# Patient Record
Sex: FEMALE | Race: WHITE | NOT HISPANIC OR LATINO
[De-identification: names, ages, dates, MRNs, and addresses within clinical notes are randomized per-mention and may not be internally consistent; named-entity substitution may affect disease eponyms.]

---

## 2017-07-31 ENCOUNTER — RECORD ABSTRACTING (OUTPATIENT)
Age: 82
End: 2017-07-31

## 2017-07-31 DIAGNOSIS — C34.90 MALIGNANT NEOPLASM OF UNSPECIFIED PART OF UNSPECIFIED BRONCHUS OR LUNG: ICD-10-CM

## 2017-08-14 ENCOUNTER — INPATIENT (INPATIENT)
Facility: HOSPITAL | Age: 82
LOS: 1 days | Discharge: HOME | End: 2017-08-16
Attending: HOSPITALIST | Admitting: FAMILY MEDICINE
Payer: MEDICARE

## 2017-08-14 VITALS — WEIGHT: 160.06 LBS | HEIGHT: 66 IN

## 2017-08-14 LAB
ADD ON TEST-SPECIMEN IN LAB: SIGNIFICANT CHANGE UP
ALBUMIN SERPL ELPH-MCNC: 3.5 G/DL — SIGNIFICANT CHANGE UP (ref 3.3–5)
ALP SERPL-CCNC: 75 U/L — SIGNIFICANT CHANGE UP (ref 40–120)
ALT FLD-CCNC: 16 U/L — SIGNIFICANT CHANGE UP (ref 12–78)
ANION GAP SERPL CALC-SCNC: 8 MMOL/L — SIGNIFICANT CHANGE UP (ref 5–17)
APTT BLD: 31 SEC — SIGNIFICANT CHANGE UP (ref 27.5–37.4)
AST SERPL-CCNC: 16 U/L — SIGNIFICANT CHANGE UP (ref 15–37)
BASOPHILS # BLD AUTO: 0 K/UL — SIGNIFICANT CHANGE UP (ref 0–0.2)
BASOPHILS NFR BLD AUTO: 0.9 % — SIGNIFICANT CHANGE UP (ref 0–2)
BILIRUB SERPL-MCNC: 0.6 MG/DL — SIGNIFICANT CHANGE UP (ref 0.2–1.2)
BUN SERPL-MCNC: 13 MG/DL — SIGNIFICANT CHANGE UP (ref 7–23)
CALCIUM SERPL-MCNC: 9 MG/DL — SIGNIFICANT CHANGE UP (ref 8.5–10.1)
CHLORIDE SERPL-SCNC: 97 MMOL/L — SIGNIFICANT CHANGE UP (ref 96–108)
CK SERPL-CCNC: 57 U/L — SIGNIFICANT CHANGE UP (ref 26–192)
CO2 SERPL-SCNC: 26 MMOL/L — SIGNIFICANT CHANGE UP (ref 22–31)
CREAT SERPL-MCNC: 0.82 MG/DL — SIGNIFICANT CHANGE UP (ref 0.5–1.3)
EOSINOPHIL # BLD AUTO: 0.2 K/UL — SIGNIFICANT CHANGE UP (ref 0–0.5)
EOSINOPHIL NFR BLD AUTO: 2.8 % — SIGNIFICANT CHANGE UP (ref 0–6)
GLUCOSE SERPL-MCNC: 218 MG/DL — HIGH (ref 70–99)
HCT VFR BLD CALC: 39.9 % — SIGNIFICANT CHANGE UP (ref 34.5–45)
HGB BLD-MCNC: 13.7 G/DL — SIGNIFICANT CHANGE UP (ref 11.5–15.5)
INR BLD: 1.04 RATIO — SIGNIFICANT CHANGE UP (ref 0.88–1.16)
LYMPHOCYTES # BLD AUTO: 0.8 K/UL — LOW (ref 1–3.3)
LYMPHOCYTES # BLD AUTO: 15.5 % — SIGNIFICANT CHANGE UP (ref 13–44)
MCHC RBC-ENTMCNC: 30.2 PG — SIGNIFICANT CHANGE UP (ref 27–34)
MCHC RBC-ENTMCNC: 34.3 GM/DL — SIGNIFICANT CHANGE UP (ref 32–36)
MCV RBC AUTO: 88 FL — SIGNIFICANT CHANGE UP (ref 80–100)
MONOCYTES # BLD AUTO: 0.4 K/UL — SIGNIFICANT CHANGE UP (ref 0–0.9)
MONOCYTES NFR BLD AUTO: 6.6 % — SIGNIFICANT CHANGE UP (ref 2–14)
NEUTROPHILS # BLD AUTO: 4.1 K/UL — SIGNIFICANT CHANGE UP (ref 1.8–7.4)
NEUTROPHILS NFR BLD AUTO: 74.1 % — SIGNIFICANT CHANGE UP (ref 43–77)
PLATELET # BLD AUTO: 208 K/UL — SIGNIFICANT CHANGE UP (ref 150–400)
POTASSIUM SERPL-MCNC: 3.8 MMOL/L — SIGNIFICANT CHANGE UP (ref 3.5–5.3)
POTASSIUM SERPL-SCNC: 3.8 MMOL/L — SIGNIFICANT CHANGE UP (ref 3.5–5.3)
PROT SERPL-MCNC: 6.5 GM/DL — SIGNIFICANT CHANGE UP (ref 6–8.3)
PROTHROM AB SERPL-ACNC: 11.2 SEC — SIGNIFICANT CHANGE UP (ref 9.8–12.7)
RBC # BLD: 4.54 M/UL — SIGNIFICANT CHANGE UP (ref 3.8–5.2)
RBC # FLD: 13.5 % — SIGNIFICANT CHANGE UP (ref 10.3–14.5)
SODIUM SERPL-SCNC: 131 MMOL/L — LOW (ref 135–145)
TROPONIN I SERPL-MCNC: 0.02 NG/ML — SIGNIFICANT CHANGE UP (ref 0.01–0.04)
TROPONIN I SERPL-MCNC: <0.015 NG/ML — SIGNIFICANT CHANGE UP (ref 0.01–0.04)
WBC # BLD: 5.5 K/UL — SIGNIFICANT CHANGE UP (ref 3.8–10.5)
WBC # FLD AUTO: 5.5 K/UL — SIGNIFICANT CHANGE UP (ref 3.8–10.5)

## 2017-08-14 PROCEDURE — 99285 EMERGENCY DEPT VISIT HI MDM: CPT

## 2017-08-14 PROCEDURE — 93010 ELECTROCARDIOGRAM REPORT: CPT

## 2017-08-14 PROCEDURE — 71020: CPT | Mod: 26

## 2017-08-14 RX ORDER — DEXTROSE 50 % IN WATER 50 %
1 SYRINGE (ML) INTRAVENOUS ONCE
Qty: 0 | Refills: 0 | Status: DISCONTINUED | OUTPATIENT
Start: 2017-08-14 | End: 2017-08-16

## 2017-08-14 RX ORDER — DEXTROSE 50 % IN WATER 50 %
12.5 SYRINGE (ML) INTRAVENOUS ONCE
Qty: 0 | Refills: 0 | Status: DISCONTINUED | OUTPATIENT
Start: 2017-08-14 | End: 2017-08-16

## 2017-08-14 RX ORDER — DEXTROSE 50 % IN WATER 50 %
25 SYRINGE (ML) INTRAVENOUS ONCE
Qty: 0 | Refills: 0 | Status: DISCONTINUED | OUTPATIENT
Start: 2017-08-14 | End: 2017-08-16

## 2017-08-14 RX ORDER — SODIUM CHLORIDE 9 MG/ML
3 INJECTION INTRAMUSCULAR; INTRAVENOUS; SUBCUTANEOUS ONCE
Qty: 0 | Refills: 0 | Status: COMPLETED | OUTPATIENT
Start: 2017-08-14 | End: 2017-08-14

## 2017-08-14 RX ORDER — INSULIN HUMAN 100 [IU]/ML
INJECTION, SOLUTION SUBCUTANEOUS EVERY 6 HOURS
Qty: 0 | Refills: 0 | Status: DISCONTINUED | OUTPATIENT
Start: 2017-08-14 | End: 2017-08-16

## 2017-08-14 RX ORDER — HEPARIN SODIUM 5000 [USP'U]/ML
5000 INJECTION INTRAVENOUS; SUBCUTANEOUS EVERY 12 HOURS
Qty: 0 | Refills: 0 | Status: DISCONTINUED | OUTPATIENT
Start: 2017-08-14 | End: 2017-08-16

## 2017-08-14 RX ORDER — ASPIRIN/CALCIUM CARB/MAGNESIUM 324 MG
81 TABLET ORAL DAILY
Qty: 0 | Refills: 0 | Status: DISCONTINUED | OUTPATIENT
Start: 2017-08-14 | End: 2017-08-14

## 2017-08-14 RX ORDER — LISINOPRIL 2.5 MG/1
2.5 TABLET ORAL DAILY
Qty: 0 | Refills: 0 | Status: DISCONTINUED | OUTPATIENT
Start: 2017-08-14 | End: 2017-08-14

## 2017-08-14 RX ORDER — ALBUTEROL 90 UG/1
2 AEROSOL, METERED ORAL EVERY 6 HOURS
Qty: 0 | Refills: 0 | Status: DISCONTINUED | OUTPATIENT
Start: 2017-08-14 | End: 2017-08-16

## 2017-08-14 RX ORDER — ASPIRIN/CALCIUM CARB/MAGNESIUM 324 MG
325 TABLET ORAL DAILY
Qty: 0 | Refills: 0 | Status: DISCONTINUED | OUTPATIENT
Start: 2017-08-14 | End: 2017-08-14

## 2017-08-14 RX ORDER — SODIUM CHLORIDE 9 MG/ML
1000 INJECTION, SOLUTION INTRAVENOUS
Qty: 0 | Refills: 0 | Status: DISCONTINUED | OUTPATIENT
Start: 2017-08-14 | End: 2017-08-16

## 2017-08-14 RX ORDER — ASPIRIN/CALCIUM CARB/MAGNESIUM 324 MG
325 TABLET ORAL ONCE
Qty: 0 | Refills: 0 | Status: COMPLETED | OUTPATIENT
Start: 2017-08-14 | End: 2017-08-14

## 2017-08-14 RX ORDER — ATORVASTATIN CALCIUM 80 MG/1
10 TABLET, FILM COATED ORAL AT BEDTIME
Qty: 0 | Refills: 0 | Status: DISCONTINUED | OUTPATIENT
Start: 2017-08-14 | End: 2017-08-16

## 2017-08-14 RX ORDER — GLUCAGON INJECTION, SOLUTION 0.5 MG/.1ML
1 INJECTION, SOLUTION SUBCUTANEOUS ONCE
Qty: 0 | Refills: 0 | Status: DISCONTINUED | OUTPATIENT
Start: 2017-08-14 | End: 2017-08-16

## 2017-08-14 RX ORDER — INSULIN LISPRO 100/ML
VIAL (ML) SUBCUTANEOUS
Qty: 0 | Refills: 0 | Status: DISCONTINUED | OUTPATIENT
Start: 2017-08-14 | End: 2017-08-16

## 2017-08-14 RX ORDER — DILTIAZEM HCL 120 MG
240 CAPSULE, EXT RELEASE 24 HR ORAL DAILY
Qty: 0 | Refills: 0 | Status: DISCONTINUED | OUTPATIENT
Start: 2017-08-14 | End: 2017-08-15

## 2017-08-14 RX ORDER — ASPIRIN/CALCIUM CARB/MAGNESIUM 324 MG
325 TABLET ORAL DAILY
Qty: 0 | Refills: 0 | Status: DISCONTINUED | OUTPATIENT
Start: 2017-08-14 | End: 2017-08-15

## 2017-08-14 RX ORDER — ACETAMINOPHEN 500 MG
650 TABLET ORAL EVERY 6 HOURS
Qty: 0 | Refills: 0 | Status: DISCONTINUED | OUTPATIENT
Start: 2017-08-14 | End: 2017-08-16

## 2017-08-14 RX ADMIN — SODIUM CHLORIDE 3 MILLILITER(S): 9 INJECTION INTRAMUSCULAR; INTRAVENOUS; SUBCUTANEOUS at 15:35

## 2017-08-14 RX ADMIN — Medication 325 MILLIGRAM(S): at 15:35

## 2017-08-14 RX ADMIN — ATORVASTATIN CALCIUM 10 MILLIGRAM(S): 80 TABLET, FILM COATED ORAL at 23:45

## 2017-08-14 RX ADMIN — HEPARIN SODIUM 5000 UNIT(S): 5000 INJECTION INTRAVENOUS; SUBCUTANEOUS at 23:45

## 2017-08-14 RX ADMIN — Medication 2.5 MILLIGRAM(S): at 23:46

## 2017-08-14 NOTE — H&P ADULT - ASSESSMENT
Pt is admitted w/    I. SOB, CHF exac, DDx ACS  - telemetry  - cont ASA    II. COPD     III. DVT prophylax 84 y/o F PMHx Lung Ca, pancreas ca, right lung ca, ca to LN, presents to the ED s/p sob. The pt provides that she has been having SOB since the past few days and had an appointment with Dr. Fagan who told her she has an EF of 20%.  She denies  headache, fever, chills, dizziness, abd pain, nvd, rash or urinary incontinence.     She reports she is planned for chemotherapy as her PET scan was positive for cervical LNodes and disease in her pancreas.      Pt is admitted w/    I. SOB, Systolic CHF? , DDx ACS  - telemetry  -   mg started in ER, will cont  - add statin, check fasting lipid panel  - start low dose ACE I, increase dose if cardiology agrees  - cardiology consult    II. COPD   - nebs PRN    III. DVT prophylax  - heparin Pt is an 84 y/o woman with PMHx Lung Ca, pancreas ca, right lung ca, ca to LN who was sent to the ER by cardiology,  Dr. Fagan due to dyspnea and low EF of 25 - 30% on an outpatient echocardiogram.   She denies  headache, fever, chills, dizziness, abd pain, nvd, rash or urinary incontinence.     She reports she is planned for chemotherapy as her PET scan was positive for cervical L Nodes and disease in her pancreas.    pBNP is 4555      Pt is admitted w/    I. SOB,  Acute Systolic CHF? , DDx ACS, dyspnea due to low EF  - telemetry  - serial EKGs, repeat troponin  -   mg started in ER, will continue  - add statin, check fasting lipid panel  - start  ACE I  - cardiology consult Dr. Fagan,    II. COPD   - nebs PRN    III. DVT prophylax  - heparin Pt is an 82 y/o woman with PMHx Lung Ca, pancreas ca, right lung ca, ca to LN who was sent to the ER by cardiology,  Dr. Fagan due to dyspnea and low EF of 25 - 30% on an outpatient echocardiogram.   She denies  headache, fever, chills, dizziness, abd pain, nvd, rash or urinary incontinence.     She reports she is planned for chemotherapy as her PET scan was positive for cervical L Nodes and disease in her pancreas.    pBNP is 4555      Pt is admitted w/    I. SOB,  Acute Systolic CHF? , DDx ACS, dyspnea due to low EF  - telemetry  - serial EKGs, repeat troponin  -   mg started in ER, will continue  - add statin, check fasting lipid panel  - start  ACE I  - cardiology consult Dr. Fagan,    II. COPD chronic  - albuterol PRN    III. DVT prophylax  - heparin Pt is an 82 y/o woman with PMHx Lung Ca, pancreas ca, right lung ca, ca to LN who was sent to the ER by cardiology,  Dr. Fagan due to dyspnea and low EF of 25 - 30% on an outpatient echocardiogram.   She denies  headache, fever, chills, dizziness, abd pain, nvd, rash or urinary incontinence.     She reports she is planned for chemotherapy as her PET scan was positive for cervical L Nodes and disease in her pancreas.    pBNP is 4555      Pt is admitted w/    I. SOB,  Acute Systolic CHF? , DDx ACS, dyspnea due to low EF  - telemetry  - serial EKGs, repeat troponin  -   mg started in ER, will continue  - add statin, check fasting lipid panel  - start  ACE I  - cardiology consult Dr. Fagan    II. COPD chronic  - albuterol PRN    III. DVT prophylax  - heparin

## 2017-08-14 NOTE — ED PROVIDER NOTE - CHPI ED SYMPTOMS NEG
no diaphoresis/no chills/no syncope/no nausea/no chest pain/no dizziness/no back pain/no fever/no vomiting

## 2017-08-14 NOTE — ED PROVIDER NOTE - PROGRESS NOTE DETAILS
patient sent by cardiology for admission, OWEN with EF 25%; patient took own aspirin today  to be admitted  d/w hospitalist

## 2017-08-14 NOTE — ED STATDOCS - PROGRESS NOTE DETAILS
84 y/o F presents to the ED c/o worsening SOB with new onset T wave inversions in I and avL and will be sent to the Main for further eval and tx.

## 2017-08-14 NOTE — H&P ADULT - NSHPPHYSICALEXAM_GEN_ALL_CORE
ICU Vital Signs Last 24 Hrs    T(F): 95 (14 Aug 2017 14:59), Max: 95 (14 Aug 2017 14:59)    HR: 98 (14 Aug 2017 17:11) (77 - 98)    BP: 154/66 (14 Aug 2017 17:11) (113/62 - 154/66)    RR: 16   SpO2: 95%

## 2017-08-14 NOTE — H&P ADULT - HISTORY OF PRESENT ILLNESS
84 y/o F PMHx Lung Ca, pancreas ca, right lung ca, ca to LN, presents to the ED s/p sob. The pt provides that she has been having SOB since the past few days and had an appointment with Dr. Fagan who told her she has an EF of 20%. No h/o headache, fever, chills, dizziness, abd pain, nvd, rash or urinary incontinence. 84 y/o F PMHx Lung Ca, pancreas ca, right lung ca, ca to LN who was sent to the ER by Dr. Fagan due to dyspnea and low EF of 25 - 30% on an outpatient echocardiogram.   She denies  headache, fever, chills, dizziness, abd pain, nvd, rash or urinary incontinence.     She reports she is planned for chemotherapy as her PET scan was positive for cervical LNodes and disease in her pancreas.      PMHx   COPD  DM II  Right Lung Ca,   pancreas ca Pt is an 84 y/o woman with PMHx Lung Ca, pancreas ca, right lung ca, ca to LN who was sent to the ER by cardiology,  Dr. Fagan due to dyspnea and low EF of 25 - 30% on an outpatient echocardiogram.   She denies  headache, fever, chills, dizziness, abd pain, nvd, rash or urinary incontinence.     She reports she is planned for chemotherapy as her PET scan was positive for cervical L Nodes and disease in her pancreas.      PMHx   COPD  DM II  Right Lung Ca,   pancreas ca     Fam Hx:  Non-contrib to current adm Pt is an 82 y/o woman with PMHx Lung Ca, pancreas ca, right lung ca, ca to LN who was sent to the ER by cardiology,  Dr. Fagan due to dyspnea and low EF of 25 - 30% on an outpatient echocardiogram.   She denies  headache, fever, chills, dizziness, abd pain, nvd, rash or urinary incontinence.     She reports she is planned for chemotherapy as her PET scan was positive for cervical L Nodes and disease in her pancreas.      PM/Surg Hx   COPD  DM II  Right Lung Ca,   pancreas ca     Fam Hx:  Non-contrib to current adm

## 2017-08-14 NOTE — ED PROVIDER NOTE - OBJECTIVE STATEMENT
alverto sob on exertion echo shows lsever ejection fraction 20% copd dm patane pmd lung ca, ln pancreas rugfht lung bibasilar crackle no pedal edema 84 y/o F PMHx Lung Ca, pancreas ca, right lung ca, ca to LN, presents to the ED s/p sob. The pt provides that she has been having SOB since the past few days and had an appointment with Dr. Fagan who told her she has an EF of 20%. No h/o headache, fever, chills, dizziness, abd pain, nvd, rash or urinary incontinence. PMD Dr. Lugo.

## 2017-08-14 NOTE — H&P ADULT - NSHPOUTPATIENTPROVIDERS_GEN_ALL_CORE
PMD Dr. Lugo. PMD Dr. Lugo. Cardiology Dr. Fagan PMD Dr. Lugo.   Cardiology Dr. Fagan  Oncology Dr. Waller

## 2017-08-15 LAB
ANION GAP SERPL CALC-SCNC: 11 MMOL/L — SIGNIFICANT CHANGE UP (ref 5–17)
BUN SERPL-MCNC: 9 MG/DL — SIGNIFICANT CHANGE UP (ref 7–23)
CALCIUM SERPL-MCNC: 8.5 MG/DL — SIGNIFICANT CHANGE UP (ref 8.5–10.1)
CHLORIDE SERPL-SCNC: 99 MMOL/L — SIGNIFICANT CHANGE UP (ref 96–108)
CHOLEST SERPL-MCNC: 183 MG/DL — SIGNIFICANT CHANGE UP (ref 10–199)
CO2 SERPL-SCNC: 22 MMOL/L — SIGNIFICANT CHANGE UP (ref 22–31)
CREAT SERPL-MCNC: 0.59 MG/DL — SIGNIFICANT CHANGE UP (ref 0.5–1.3)
GLUCOSE SERPL-MCNC: 102 MG/DL — HIGH (ref 70–99)
HBA1C BLD-MCNC: 6.2 % — HIGH (ref 4–5.6)
HDLC SERPL-MCNC: 55 MG/DL — SIGNIFICANT CHANGE UP (ref 40–125)
LIPID PNL WITH DIRECT LDL SERPL: 103 MG/DL — SIGNIFICANT CHANGE UP
POTASSIUM SERPL-MCNC: 3.7 MMOL/L — SIGNIFICANT CHANGE UP (ref 3.5–5.3)
POTASSIUM SERPL-SCNC: 3.7 MMOL/L — SIGNIFICANT CHANGE UP (ref 3.5–5.3)
SODIUM SERPL-SCNC: 132 MMOL/L — LOW (ref 135–145)
TOTAL CHOLESTEROL/HDL RATIO MEASUREMENT: 3.3 RATIO — SIGNIFICANT CHANGE UP (ref 3.3–7.1)
TRIGL SERPL-MCNC: 125 MG/DL — SIGNIFICANT CHANGE UP (ref 10–149)
TROPONIN I SERPL-MCNC: 0.02 NG/ML — SIGNIFICANT CHANGE UP (ref 0.01–0.04)
TROPONIN I SERPL-MCNC: <0.015 NG/ML — SIGNIFICANT CHANGE UP (ref 0.01–0.04)

## 2017-08-15 PROCEDURE — 93010 ELECTROCARDIOGRAM REPORT: CPT

## 2017-08-15 RX ORDER — ASPIRIN/CALCIUM CARB/MAGNESIUM 324 MG
81 TABLET ORAL DAILY
Qty: 0 | Refills: 0 | Status: DISCONTINUED | OUTPATIENT
Start: 2017-08-15 | End: 2017-08-16

## 2017-08-15 RX ORDER — SPIRONOLACTONE 25 MG/1
25 TABLET, FILM COATED ORAL DAILY
Qty: 0 | Refills: 0 | Status: DISCONTINUED | OUTPATIENT
Start: 2017-08-15 | End: 2017-08-16

## 2017-08-15 RX ORDER — FUROSEMIDE 40 MG
20 TABLET ORAL ONCE
Qty: 0 | Refills: 0 | Status: COMPLETED | OUTPATIENT
Start: 2017-08-15 | End: 2017-08-15

## 2017-08-15 RX ORDER — METOPROLOL TARTRATE 50 MG
25 TABLET ORAL DAILY
Qty: 0 | Refills: 0 | Status: DISCONTINUED | OUTPATIENT
Start: 2017-08-15 | End: 2017-08-16

## 2017-08-15 RX ADMIN — SPIRONOLACTONE 25 MILLIGRAM(S): 25 TABLET, FILM COATED ORAL at 12:34

## 2017-08-15 RX ADMIN — Medication 240 MILLIGRAM(S): at 05:59

## 2017-08-15 RX ADMIN — Medication 25 MILLIGRAM(S): at 12:34

## 2017-08-15 RX ADMIN — Medication 81 MILLIGRAM(S): at 12:34

## 2017-08-15 RX ADMIN — Medication 20 MILLIGRAM(S): at 17:50

## 2017-08-15 RX ADMIN — ATORVASTATIN CALCIUM 10 MILLIGRAM(S): 80 TABLET, FILM COATED ORAL at 22:07

## 2017-08-15 NOTE — PROGRESS NOTE ADULT - SUBJECTIVE AND OBJECTIVE BOX
84 y/o woman with PMHx Pancreatic Ca, Right lung CA, COPD, SIADH, Chronic Hyponatremia, DM type II, sent to the ED by Dr. Fagan due to dyspnea and low EF of 25 - 30% on an outpatient echocardiogram.  She reports she is planned for chemotherapy as her PET scan was positive for cervical L Nodes and disease in her pancreas.      8.15:  comfortable, no distress      REVIEW OF SYSTEMS:    CONSTITUTIONAL: No weakness, No fevers or chills  ENT: No ear ache, No sorethroat  NECK: No pain, No stiffness  RESPIRATORY: No cough, No wheezing, No hemoptysis; No dyspnea  CARDIOVASCULAR: No chest pain, No palpitations  GASTROINTESTINAL: No abd pain, No nausea, No vomiting, No hematemesis, No diarrhea or constipation. No melena, No hematochezia.  GENITOURINARY: No dysuria, No  hematuria  NEUROLOGICAL: No diplopia, No paresthesia, No motor dysfunction  MUSCULOSKELETAL: No arthralgia, No myalgia  SKIN: No rashes, or lesions   PSYCH: no anxiety, no suicidal ideation    All other review of systems is negative unless indicated above    Vital Signs Last 24 Hrs  T(C): 36.2 (15 Aug 2017 10:12), Max: 36.3 (14 Aug 2017 23:13)  T(F): 97.2 (15 Aug 2017 10:12), Max: 97.4 (14 Aug 2017 23:13)  HR: 90 (15 Aug 2017 10:12) (77 - 102)  BP: 105/58 (15 Aug 2017 10:12) (105/58 - 154/66)  BP(mean): --  RR: 18 (15 Aug 2017 10:12) (15 - 20)  SpO2: 99% (15 Aug 2017 10:12) (94% - 100%)    PHYSICAL EXAM:    GENERAL: NAD, Well nourished  HEENT:  NC/AT, EOMI, PERRLA, No scleral icterus, Moist mucous membranes  NECK: Supple, No JVD  CNS:  Alert & Oriented X3, Motor Strength 5/5 B/L upper and lower extremities; DTRs 2+ intact   LUNG: Normal Breath sounds, Clear to auscultation bilaterally, No rales, No rhonchi, No wheezing  HEART: RRR; No murmurs, No rubs  ABDOMEN: +BS, ST/ND/NT  GENITOURINARY: Voiding, Bladder not distended  EXTREMITIES:  2+ Peripheral Pulses, No clubbing, No cyanosis, No tibial edema  MUSCULOSKELTAL: Joints normal ROM, No TTP, No effusion  VAGINAL: deferred  SKIN: no rashes  RECTAL: deferred, not indicated  BREAST: deferred                          13.7   5.5   )-----------( 208      ( 14 Aug 2017 15:31 )             39.9     08-15    132<L>  |  99  |  9   ----------------------------<  102<H>  3.7   |  22  |  0.59    Ca    8.5      15 Aug 2017 07:12    TPro  6.5  /  Alb  3.5  /  TBili  0.6  /  DBili  x   /  AST  16  /  ALT  16  /  AlkPhos  75  08-14      MEDICATIONS  (STANDING):  heparin  Injectable 5000 Unit(s) SubCutaneous every 12 hours  atorvastatin 10 milliGRAM(s) Oral at bedtime  insulin lispro (HumaLOG) corrective regimen sliding scale   SubCutaneous three times a day before meals  insulin regular  human corrective regimen sliding scale   SubCutaneous every 6 hours  enalapril 5 milliGRAM(s) Oral every 12 hours  spironolactone 25 milliGRAM(s) Oral daily  metoprolol succinate ER 25 milliGRAM(s) Oral daily  aspirin enteric coated 81 milliGRAM(s) Oral daily    MEDICATIONS  (PRN):  acetaminophen   Tablet. 650 milliGRAM(s) Oral every 6 hours PRN Mild Pain (1 - 3)  dextrose Gel 1 Dose(s) Oral once PRN Blood Glucose LESS THAN 70 milliGRAM(s)/deciLiter  glucagon  Injectable 1 milliGRAM(s) IntraMuscular once PRN Glucose <70 milliGRAM(s)/deciLiter  ALBUTerol    90 MICROgram(s) HFA Inhaler 2 Puff(s) Inhalation every 6 hours PRN Shortness of Breath and/or Wheezing      all labs reviewed  all imaging reviewed        Assessment and Plan:       I. Acute on chronic systolic left Chf:  - telemetry  - will give one dose of IV Lasix  cw Aldactone/Enalapril/Bblockers    II. COPD chronic  - albuterol PRN    III. Lung Ca:   outpt follow up    IV: DM type II:   Novolog SS

## 2017-08-15 NOTE — PROGRESS NOTE ADULT - ASSESSMENT
Acute systolic CHF , LVEF 20 to 25%.  Metastatic Lung cancer Stage 4.  Severe COPD.  Moderate MR  Suggest   Continue with ACEI, add betablocker & diuretics if she tolerates.  I/O . daily weights,  Follow up with electrolytes.  Ambulate.  Due to metastatic lung cancer medical management is being planned . She has refused invasive  intervention including cardiac cath .  Discussed with pt's daughter & Dr Waller.'  Discussed with Hospitalist.

## 2017-08-15 NOTE — PROGRESS NOTE ADULT - SUBJECTIVE AND OBJECTIVE BOX
Patient is a 83y old  Female who presents with a chief complaint of "The doctor sent me here." (14 Aug 2017 23:51)      HPI:  Pt is an 82 y/o woman with PMHx  metastatic Lung Ca stage 4 , who was sent to the ER by cardiology,  She has been c/o increasing SOB on exertion & she was Dx to have acute systolic CHF , LVEF 20 to 25% , she was admitted for adjustment of meds & further evaluation. She is comfortable at rest but she gets SOB easily. She denies any chest pain . No cough or fever. She is in NSR on tele.    She reports she is planned for chemotherapy as her PET scan was positive for cervical L Nodes and disease in her pancreas.      PM/Surg Hx   COPD  DM II  Right Lung Ca,   pancreas ca     Chest Xray  < from: Xray Chest 2 Views PA/Lat 08.14.17   FINDINGS:  Prior study dated 10/19/2016 was available for review.    The lungs demonstrate linear fibrotic scarring versus subsegmental   atelectasis in the left lower lobe. No pleural effusion is seen. The   heart and mediastinum appear intact.  A right-sided Port-A-Cath catheter   seen with the distal tip overlying the superior vena cava.      IMPRESSION:  Linear fibrotic scarring versus subsegmental atelectasis   noted in the left lower lobe.           < end of copied text >      PREVIOUS DIAGNOSTIC TESTING:      ECHO  FINDINGS: Aug 2017 LVEF 20 to 25%  Moderate MR       MEDICATIONS  (STANDING):  heparin  Injectable 5000 Unit(s) SubCutaneous every 12 hours  atorvastatin 10 milliGRAM(s) Oral at bedtime  insulin lispro (HumaLOG) corrective regimen sliding scale   SubCutaneous three times a day before meals  insulin regular  human corrective regimen sliding scale   SubCutaneous every 6 hours  dextrose 5%. 1000 milliLiter(s) (50 mL/Hr) IV Continuous <Continuous>  dextrose 50% Injectable 12.5 Gram(s) IV Push once  dextrose 50% Injectable 25 Gram(s) IV Push once  dextrose 50% Injectable 25 Gram(s) IV Push once  enalapril 5 milliGRAM(s) Oral every 12 hours  spironolactone 25 milliGRAM(s) Oral daily  metoprolol succinate ER 25 milliGRAM(s) Oral daily  aspirin enteric coated 81 milliGRAM(s) Oral daily    MEDICATIONS  (PRN):  acetaminophen   Tablet. 650 milliGRAM(s) Oral every 6 hours PRN Mild Pain (1 - 3)  dextrose Gel 1 Dose(s) Oral once PRN Blood Glucose LESS THAN 70 milliGRAM(s)/deciLiter  glucagon  Injectable 1 milliGRAM(s) IntraMuscular once PRN Glucose <70 milliGRAM(s)/deciLiter  ALBUTerol    90 MICROgram(s) HFA Inhaler 2 Puff(s) Inhalation every 6 hours PRN Shortness of Breath and/or Wheezing          REVIEW OF SYSTEM:  Pertinent items are noted in HPI.  Constitutional	Negative for chills, fevers, sweats.    Eyes: 	Negative for visual disturbance.  Ears, nose, mouth, throat, and face: Negative for epistaxis, nasal congestion, sore throat and tinnitus.  Neck:	Negative for enlargement, pain and difficulty in swallowing  Respiration : Negative for cough, , pleuritic chest pain and wheezing  Cardiovascular: Negative for chest pain,  and palpitations , she has dyspnea on exertion    Gastrointestinal : Negative for abdominal pain, diarrhea, nausea and vomiting  Genitourinary: Negative for dysuria, frequency and urinary incontinence .  Skin: Negative for  rash, pruritus, swelling, dryness .  	  Hematologic/lymphatic: Negative for bleeding and easy bruising  Musculoskeletal: Negative for arthralgias, back pain and muscle weakness.  Neurological: Negative for dizziness, headaches, seizures and tremors  Behavioral/Psych: Negative for mood change, depression.  Endocrine:	Negative for blurry vision, polydipsia and polyuria, diaphoresis.   Allergic/Immunologic:	Negative for anaphylaxis, angioedema and urticaria.      Vital Signs Last 24 Hrs  T(C): 36.3 (15 Aug 2017 04:04), Max: 36.3 (14 Aug 2017 23:13)  T(F): 97.3 (15 Aug 2017 04:04), Max: 97.4 (14 Aug 2017 23:13)  HR: 90 (15 Aug 2017 05:53) (77 - 102)  BP: 107/60 (15 Aug 2017 05:53) (105/68 - 154/66)  BP(mean): --  RR: 20 (14 Aug 2017 23:13) (15 - 20)  SpO2: 100% (15 Aug 2017 04:04) (94% - 100%)      PHYSICAL EXAM  General Appearance: cooperative, no acute distress,   HEENT: PERRL, conjunctiva clear, EOM's intact, non injected pharynx, no exudate.  Neck: Supple, , no adenopathy, thyroid: not enlarged, no carotid bruit or JVD  Lungs: Scattered basal rales , diminished breath sounds both sides.  Heart: Regular rate and rhythm, S1, S2 normal, grade 1/6 holosystolic  murmur,no rub or gallop  Abdomen: Soft, non-tender, bowel sounds active , no hepatosplenomegaly  Extremities: no cyanosis or edema, no joint swelling  Skin: Skin color, texture normal, no rashes   Neurologic: Alert and oriented X3 , cranial nerves intact, sensory and motor normal,        INTERPRETATION OF TELEMETRY: NSR PVCs    ECG: Sinus tachycardia NST PVC        LABS:                          13.7   5.5   )-----------( 208      ( 14 Aug 2017 15:31 )             39.9     08-15    132<L>  |  99  |  9   ----------------------------<  102<H>  3.7   |  22  |  0.59    Ca    8.5      15 Aug 2017 07:12    TPro  6.5  /  Alb  3.5  /  TBili  0.6  /  DBili  x   /  AST  16  /  ALT  16  /  AlkPhos  75  08-14    CARDIAC MARKERS ( 15 Aug 2017 07:12 )  <0.015 ng/mL / x     / x     / x     / x      CARDIAC MARKERS ( 15 Aug 2017 00:02 )  0.017 ng/mL / x     / x     / x     / x      CARDIAC MARKERS ( 14 Aug 2017 19:51 )  0.017 ng/mL / x     / x     / x     / x      CARDIAC MARKERS ( 14 Aug 2017 15:31 )  <0.015 ng/mL / x     / 57 U/L / x     / x            Pro BNP  4555 08-14 @ 15:31  D Dimer  -- 08-14 @ 15:31    PT/INR - ( 14 Aug 2017 15:31 )   PT: 11.2 sec;   INR: 1.04 ratio         PTT - ( 14 Aug 2017 15:31 )  PTT:31.0 sec

## 2017-08-16 ENCOUNTER — TRANSCRIPTION ENCOUNTER (OUTPATIENT)
Age: 82
End: 2017-08-16

## 2017-08-16 VITALS
HEART RATE: 75 BPM | OXYGEN SATURATION: 100 % | SYSTOLIC BLOOD PRESSURE: 128 MMHG | RESPIRATION RATE: 17 BRPM | TEMPERATURE: 99 F | DIASTOLIC BLOOD PRESSURE: 70 MMHG

## 2017-08-16 LAB
ANION GAP SERPL CALC-SCNC: 9 MMOL/L — SIGNIFICANT CHANGE UP (ref 5–17)
BUN SERPL-MCNC: 15 MG/DL — SIGNIFICANT CHANGE UP (ref 7–23)
CALCIUM SERPL-MCNC: 9 MG/DL — SIGNIFICANT CHANGE UP (ref 8.5–10.1)
CHLORIDE SERPL-SCNC: 97 MMOL/L — SIGNIFICANT CHANGE UP (ref 96–108)
CO2 SERPL-SCNC: 26 MMOL/L — SIGNIFICANT CHANGE UP (ref 22–31)
CREAT SERPL-MCNC: 0.69 MG/DL — SIGNIFICANT CHANGE UP (ref 0.5–1.3)
GLUCOSE SERPL-MCNC: 111 MG/DL — HIGH (ref 70–99)
HCT VFR BLD CALC: 40.2 % — SIGNIFICANT CHANGE UP (ref 34.5–45)
HGB BLD-MCNC: 13.3 G/DL — SIGNIFICANT CHANGE UP (ref 11.5–15.5)
MAGNESIUM SERPL-MCNC: 2 MG/DL — SIGNIFICANT CHANGE UP (ref 1.6–2.6)
MCHC RBC-ENTMCNC: 29.2 PG — SIGNIFICANT CHANGE UP (ref 27–34)
MCHC RBC-ENTMCNC: 33.1 GM/DL — SIGNIFICANT CHANGE UP (ref 32–36)
MCV RBC AUTO: 88 FL — SIGNIFICANT CHANGE UP (ref 80–100)
PLATELET # BLD AUTO: 217 K/UL — SIGNIFICANT CHANGE UP (ref 150–400)
POTASSIUM SERPL-MCNC: 3.7 MMOL/L — SIGNIFICANT CHANGE UP (ref 3.5–5.3)
POTASSIUM SERPL-SCNC: 3.7 MMOL/L — SIGNIFICANT CHANGE UP (ref 3.5–5.3)
RBC # BLD: 4.56 M/UL — SIGNIFICANT CHANGE UP (ref 3.8–5.2)
RBC # FLD: 13.7 % — SIGNIFICANT CHANGE UP (ref 10.3–14.5)
SODIUM SERPL-SCNC: 132 MMOL/L — LOW (ref 135–145)
WBC # BLD: 5 K/UL — SIGNIFICANT CHANGE UP (ref 3.8–10.5)
WBC # FLD AUTO: 5 K/UL — SIGNIFICANT CHANGE UP (ref 3.8–10.5)

## 2017-08-16 RX ORDER — ATORVASTATIN CALCIUM 80 MG/1
1 TABLET, FILM COATED ORAL
Qty: 30 | Refills: 0 | OUTPATIENT
Start: 2017-08-16 | End: 2017-09-15

## 2017-08-16 RX ORDER — SPIRONOLACTONE 25 MG/1
1 TABLET, FILM COATED ORAL
Qty: 30 | Refills: 0 | OUTPATIENT
Start: 2017-08-16 | End: 2017-09-15

## 2017-08-16 RX ORDER — METOPROLOL TARTRATE 50 MG
2 TABLET ORAL
Qty: 0 | Refills: 0 | COMMUNITY
Start: 2017-08-16 | End: 2017-09-15

## 2017-08-16 RX ORDER — ASPIRIN/CALCIUM CARB/MAGNESIUM 324 MG
1 TABLET ORAL
Qty: 30 | Refills: 0 | OUTPATIENT
Start: 2017-08-16 | End: 2017-09-15

## 2017-08-16 RX ORDER — METOPROLOL TARTRATE 50 MG
1 TABLET ORAL
Qty: 30 | Refills: 0 | OUTPATIENT
Start: 2017-08-16 | End: 2017-09-15

## 2017-08-16 RX ADMIN — Medication 81 MILLIGRAM(S): at 11:47

## 2017-08-16 RX ADMIN — HEPARIN SODIUM 5000 UNIT(S): 5000 INJECTION INTRAVENOUS; SUBCUTANEOUS at 00:42

## 2017-08-16 NOTE — DISCHARGE NOTE ADULT - CARE PLAN
Principal Discharge DX:	Acute systolic congestive heart failure  Goal:	take your medicine, follow up in Dr. Fagan office for ongoing management  Instructions for follow-up, activity and diet:	as above Spontaneous, unlabored and symmetrical

## 2017-08-16 NOTE — DISCHARGE NOTE ADULT - CARE PROVIDER_API CALL
Marcial Fagan (MD), Cardiovascular Disease; Internal Medicine  [address] [place] [address]  Phone: [phone]  Fax: [phone]

## 2017-08-16 NOTE — DISCHARGE NOTE ADULT - HOSPITAL COURSE
HPI:  Pt is an 82 y/o woman with PMHx Lung Ca, pancreas ca, right lung ca, ca to LN who was sent to the ER by cardiology,  Dr. Fagan due to dyspnea and low EF of 25 - 30% on an outpatient echocardiogram.   She denies  headache, fever, chills, dizziness, abd pain, nvd, rash or urinary incontinence.       #Review of system- rest of review of systems are negative except as mentioned in HPI    PHYSICAL EXAM:  Vital Signs Last 24 Hrs  T(C): 36.3 (16 Aug 2017 10:28), Max: 36.4 (16 Aug 2017 04:00)  T(F): 97.3 (16 Aug 2017 10:28), Max: 97.5 (16 Aug 2017 04:00)  HR: 97 (16 Aug 2017 10:28) (83 - 114)  BP: 96/62 (16 Aug 2017 10:28) (96/62 - 107/60)  BP(mean): --  RR: 17 (16 Aug 2017 10:28) (17 - 18)  SpO2: 100% (16 Aug 2017 10:28) (97% - 100%)    GENERAL: comfortable   HEAD:  Atraumatic, Normocephalic  EYES: EOMI, PERRLA, conjunctiva and sclera clear  HEENT: Moist mucous membranes  NECK: Supple, No JVD  NERVOUS SYSTEM:  Alert & Oriented X3, Motor Strength 5/5 B/L upper and lower extremities; DTRs 2+ intact and symmetric  CHEST/LUNG: Clear to auscultation bilaterally; No rales, rhonchi, wheezing, or rubs  HEART:S1S2 normal, no murmer  ABDOMEN: Soft, Nontender, Nondistended; Bowel sounds present  GENITOURINARY- Voiding, no palpable bladder  EXTREMITIES:  2+ Peripheral Pulses, No clubbing, cyanosis, or edema  MUSCULOSKELTAL- No muscle tenderness, Muscle tone normal, No joint tenderness, no Joint swelling, Joint range of motion-normal  SKIN-no rash, no lesion  PSYCH- Mood stable  LYMPH Node- No palpable lymph node    LABS:                        13.3   5.0   )-----------( 217      ( 16 Aug 2017 06:12 )             40.2     08-16    132<L>  |  97  |  15  ----------------------------<  111<H>  3.7   |  26  |  0.69    Ca    9.0      16 Aug 2017 06:12  Mg     2.0     08-16    TPro  6.5  /  Alb  3.5  /  TBili  0.6  /  DBili  x   /  AST  16  /  ALT  16  /  AlkPhos  75  08-14    PT/INR - ( 14 Aug 2017 15:31 )   PT: 11.2 sec;   INR: 1.04 ratio         PTT - ( 14 Aug 2017 15:31 )  PTT:31.0 sec    Standing medicine  heparin  Injectable 5000 Unit(s) SubCutaneous every 12 hours  acetaminophen   Tablet. 650 milliGRAM(s) Oral every 6 hours PRN  atorvastatin 10 milliGRAM(s) Oral at bedtime  insulin lispro (HumaLOG) corrective regimen sliding scale   SubCutaneous three times a day before meals  insulin regular  human corrective regimen sliding scale   SubCutaneous every 6 hours  dextrose 5%. 1000 milliLiter(s) IV Continuous <Continuous>  dextrose Gel 1 Dose(s) Oral once PRN  dextrose 50% Injectable 12.5 Gram(s) IV Push once  dextrose 50% Injectable 25 Gram(s) IV Push once  dextrose 50% Injectable 25 Gram(s) IV Push once  glucagon  Injectable 1 milliGRAM(s) IntraMuscular once PRN  ALBUTerol    90 MICROgram(s) HFA Inhaler 2 Puff(s) Inhalation every 6 hours PRN  enalapril 5 milliGRAM(s) Oral every 12 hours  spironolactone 25 milliGRAM(s) Oral daily  metoprolol succinate ER 25 milliGRAM(s) Oral daily  aspirin enteric coated 81 milliGRAM(s) Oral daily    A/P    #Acute on chronic systolic left CHF  -compensated right now. She is on aldactone   -discussed with Dr. Fagan- d/c today on current regimen of medicine and she will follow up with Dr. Fagan for ongoing management. She is not on lasix and she appears on dry side. I have advised her to follow up in cardiology office for possible introduction of lasix     #COPD chronic and stable     #Lung Ca:   outpt follow up    # DM type II:  -resume home meds     #d/c today     #time spent more than 30 minutes     #above discussed with pt in detail and all questions have been answered

## 2017-08-16 NOTE — DISCHARGE NOTE ADULT - MEDICATION SUMMARY - MEDICATIONS TO TAKE
I will START or STAY ON the medications listed below when I get home from the hospital:    spironolactone 25 mg oral tablet  -- 1 tab(s) by mouth once a day  -- Indication: For heart failure    aspirin 81 mg oral delayed release tablet  -- 1 tab(s) by mouth once a day  -- Indication: For heart failure    enalapril 5 mg oral tablet  -- 1 tab(s) by mouth every 12 hours  -- Indication: For heart failure    dilTIAZem 240 mg/24 hours oral capsule, extended release  -- 1 cap(s) by mouth once a day  -- Indication: For heart failure    metFORMIN 500 mg oral tablet  --  by mouth once a day  -- Indication: For Dm    atorvastatin 10 mg oral tablet  -- 1 tab(s) by mouth once a day (at bedtime)  -- Indication: For Cad    metoprolol succinate 25 mg oral tablet, extended release  -- 1 tab(s) by mouth once a day  -- Indication: For heart failure

## 2017-08-16 NOTE — DISCHARGE NOTE ADULT - PATIENT PORTAL LINK FT
“You can access the [facility] Patient Portal, offered by [facility], by registering with the following website: [email or web address]”

## 2017-08-16 NOTE — PROGRESS NOTE ADULT - ASSESSMENT
Acute systolic CHF , LVEF 20 to 25%. She feels better since admission.  Metastatic Lung cancer Stage 4.  Severe COPD.  Moderate MR  Suggest   Continue with ACEI,  betablocker & diuretics if she tolerates.  I/O . daily weights,  Follow up with electrolytes.  Ambulate.  Due to metastatic lung cancer medical management is being planned . .As per patient's oncologist Dr. Waller, due to metastatic cancer her long-term prognosis is guarded.  Discussed with pt's Lexi.  Discussed with Hospitalist.

## 2017-08-16 NOTE — PROGRESS NOTE ADULT - SUBJECTIVE AND OBJECTIVE BOX
)      HPI:  Pt is an 82 y/o woman with PMHx  metastatic Lung Ca stage 4 , who was sent to the ER by cardiology,  She has been c/o increasing SOB on exertion & she was Dx to have acute systolic CHF , LVEF 20 to 25% , She feels better since admission. She denies any chest pain, shortness of breath or dizziness. She is in normal sinus rhythm on telemetry. She has elected to continue with medical management.      PM/Surg Hx   COPD  DM II  Right Lung Ca,   pancreas ca     Chest Xray  < from: Xray Chest 2 Views PA/Lat 08.14.17   FINDINGS:  Prior study dated 10/19/2016 was available for review.    The lungs demonstrate linear fibrotic scarring versus subsegmental   atelectasis in the left lower lobe. No pleural effusion is seen. The   heart and mediastinum appear intact.  A right-sided Port-A-Cath catheter   seen with the distal tip overlying the superior vena cava.      IMPRESSION:  Linear fibrotic scarring versus subsegmental atelectasis   noted in the left lower lobe.            MEDICATIONS  (STANDING):  heparin  Injectable 5000 Unit(s) SubCutaneous every 12 hours  atorvastatin 10 milliGRAM(s) Oral at bedtime  insulin lispro (HumaLOG) corrective regimen sliding scale   SubCutaneous three times a day before meals  insulin regular  human corrective regimen sliding scale   SubCutaneous every 6 hours  dextrose 5%. 1000 milliLiter(s) (50 mL/Hr) IV Continuous <Continuous>  dextrose 50% Injectable 12.5 Gram(s) IV Push once  dextrose 50% Injectable 25 Gram(s) IV Push once  dextrose 50% Injectable 25 Gram(s) IV Push once  enalapril 5 milliGRAM(s) Oral every 12 hours  spironolactone 25 milliGRAM(s) Oral daily  metoprolol succinate ER 25 milliGRAM(s) Oral daily  aspirin enteric coated 81 milliGRAM(s) Oral daily    MEDICATIONS  (PRN):  acetaminophen   Tablet. 650 milliGRAM(s) Oral every 6 hours PRN Mild Pain (1 - 3)  dextrose Gel 1 Dose(s) Oral once PRN Blood Glucose LESS THAN 70 milliGRAM(s)/deciLiter  glucagon  Injectable 1 milliGRAM(s) IntraMuscular once PRN Glucose <70 milliGRAM(s)/deciLiter  ALBUTerol    90 MICROgram(s) HFA Inhaler 2 Puff(s) Inhalation every 6 hours PRN Shortness of Breath and/or Wheezing          REVIEW OF SYSTEM:  Pertinent items are noted in HPI.  Constitutional	Negative for chills, fevers, sweats.    Eyes: 	Negative for visual disturbance.  Ears, nose, mouth, throat, and face: Negative for epistaxis, nasal congestion, sore throat .  Neck:	Negative for enlargement, pain and difficulty in swallowing  Respiration : Negative for cough, dyspnea on exertion, pleuritic chest pain and wheezing  Cardiovascular: Negative for chest pain, dyspnea and palpitations    Gastrointestinal : Negative for abdominal pain, diarrhea, nausea and vomiting  Genitourinary: Negative for dysuria, frequency and urinary incontinence .  Skin: Negative for  rash, pruritus, swelling, dryness .  	  Hematologic/lymphatic: Negative for bleeding and easy bruising  Musculoskeletal: Negative for arthralgias, back pain and muscle weakness.  Neurological: Negative for dizziness, headaches, seizures and tremors  Behavioral/Psych: Negative for mood change, depression.  Endocrine:	Negative for blurry vision, polydipsia and polyuria, diaphoresis.   Allergic/Immunologic:	Negative for anaphylaxis, angioedema and urticaria.      Vital Signs Last 24 Hrs  T(C): 36.4 (16 Aug 2017 04:00), Max: 36.4 (16 Aug 2017 04:00)  T(F): 97.5 (16 Aug 2017 04:00), Max: 97.5 (16 Aug 2017 04:00)  HR: 114 (16 Aug 2017 04:00) (83 - 114)  BP: 96/67 (16 Aug 2017 04:00) (96/67 - 107/60)  BP(mean): --  RR: 17 (16 Aug 2017 04:00) (17 - 18)  SpO2: 97% (16 Aug 2017 04:00) (97% - 99%)    I&O's Summary    15 Aug 2017 07:01  -  16 Aug 2017 07:00  --------------------------------------------------------  IN: 120 mL / OUT: 0 mL / NET: 120 mL      PHYSICAL EXAM  General Appearance: cooperative, no acute distress,   HEENT: PERRL, conjunctiva clear, EOM's intact, non injected pharynx, no exudate.  Neck: Supple, , no adenopathy, thyroid: not enlarged, no carotid bruit or JVD  Back: Symmetric, no  tenderness,no soft tissue tenderness  Lungs: Clear to auscultation bilateral,no adventitious breath sounds, normal   expiratory phase  Heart: Regular rate and rhythm, S1, S2 normal,grade 1/6 holosystolic  murmur, no  rub or gallop  Abdomen: Soft, non-tender, bowel sounds active , no hepatosplenomegaly  Extremities: no cyanosis or edema, no joint swelling  Skin: Skin color, texture normal, no rashes   Neurologic: Alert and oriented X3 , cranial nerves intact, sensory and motor normal,        INTERPRETATION OF TELEMETRY: NSR            LABS:                          13.3   5.0   )-----------( 217      ( 16 Aug 2017 06:12 )             40.2     08-16    132<L>  |  97  |  15  ----------------------------<  111<H>  3.7   |  26  |  0.69    Ca    9.0      16 Aug 2017 06:12  Mg     2.0     08-16    TPro  6.5  /  Alb  3.5  /  TBili  0.6  /  DBili  x   /  AST  16  /  ALT  16  /  AlkPhos  75  08-14    CARDIAC MARKERS ( 15 Aug 2017 07:12 )  <0.015 ng/mL / x     / x     / x     / x      CARDIAC MARKERS ( 15 Aug 2017 00:02 )  0.017 ng/mL / x     / x     / x     / x      CARDIAC MARKERS ( 14 Aug 2017 19:51 )  0.017 ng/mL / x     / x     / x     / x      CARDIAC MARKERS ( 14 Aug 2017 15:31 )  <0.015 ng/mL / x     / 57 U/L / x     / x          Lipid Panel  183  55  103  125    Pro BNP  4555 08-14 @ 15:31  D Dimer  -- 08-14 @ 15:31    PT/INR - ( 14 Aug 2017 15:31 )   PT: 11.2 sec;   INR: 1.04 ratio         PTT - ( 14 Aug 2017 15:31 )  PTT:31.0 sec          :

## 2017-08-21 DIAGNOSIS — Z85.118 PERSONAL HISTORY OF OTHER MALIGNANT NEOPLASM OF BRONCHUS AND LUNG: ICD-10-CM

## 2017-08-21 DIAGNOSIS — I50.23 ACUTE ON CHRONIC SYSTOLIC (CONGESTIVE) HEART FAILURE: ICD-10-CM

## 2017-08-21 DIAGNOSIS — C25.9 MALIGNANT NEOPLASM OF PANCREAS, UNSPECIFIED: ICD-10-CM

## 2017-08-21 DIAGNOSIS — E11.9 TYPE 2 DIABETES MELLITUS WITHOUT COMPLICATIONS: ICD-10-CM

## 2017-08-21 DIAGNOSIS — J44.9 CHRONIC OBSTRUCTIVE PULMONARY DISEASE, UNSPECIFIED: ICD-10-CM

## 2017-08-21 DIAGNOSIS — C34.81 MALIGNANT NEOPLASM OF OVERLAPPING SITES OF RIGHT BRONCHUS AND LUNG: ICD-10-CM

## 2017-08-21 DIAGNOSIS — I34.0 NONRHEUMATIC MITRAL (VALVE) INSUFFICIENCY: ICD-10-CM

## 2017-08-21 DIAGNOSIS — C77.0 SECONDARY AND UNSPECIFIED MALIGNANT NEOPLASM OF LYMPH NODES OF HEAD, FACE AND NECK: ICD-10-CM

## 2018-03-06 ENCOUNTER — EMERGENCY (EMERGENCY)
Facility: HOSPITAL | Age: 83
LOS: 0 days | Discharge: HOME | End: 2018-03-06
Attending: EMERGENCY MEDICINE | Admitting: EMERGENCY MEDICINE
Payer: MEDICARE

## 2018-03-06 ENCOUNTER — INPATIENT (INPATIENT)
Facility: HOSPITAL | Age: 83
LOS: 9 days | Discharge: NURSING, REHAB OR LONG-TERM CARE | End: 2018-03-16
Attending: INTERNAL MEDICINE | Admitting: INTERNAL MEDICINE
Payer: MEDICARE

## 2018-03-06 VITALS
DIASTOLIC BLOOD PRESSURE: 77 MMHG | HEART RATE: 115 BPM | OXYGEN SATURATION: 93 % | HEIGHT: 65 IN | WEIGHT: 139.99 LBS | SYSTOLIC BLOOD PRESSURE: 129 MMHG | RESPIRATION RATE: 18 BRPM

## 2018-03-06 VITALS
HEIGHT: 61 IN | RESPIRATION RATE: 16 BRPM | WEIGHT: 119.93 LBS | TEMPERATURE: 99 F | DIASTOLIC BLOOD PRESSURE: 68 MMHG | OXYGEN SATURATION: 100 % | HEART RATE: 104 BPM | SYSTOLIC BLOOD PRESSURE: 115 MMHG

## 2018-03-06 VITALS
SYSTOLIC BLOOD PRESSURE: 92 MMHG | RESPIRATION RATE: 16 BRPM | HEART RATE: 93 BPM | DIASTOLIC BLOOD PRESSURE: 57 MMHG | OXYGEN SATURATION: 100 %
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
## 2018-03-06 DIAGNOSIS — S09.90XA UNSPECIFIED INJURY OF HEAD, INITIAL ENCOUNTER: ICD-10-CM

## 2018-03-06 DIAGNOSIS — Z79.84 LONG TERM (CURRENT) USE OF ORAL HYPOGLYCEMIC DRUGS: ICD-10-CM

## 2018-03-06 DIAGNOSIS — Z85.118 PERSONAL HISTORY OF OTHER MALIGNANT NEOPLASM OF BRONCHUS AND LUNG: ICD-10-CM
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
## 2018-03-06 DIAGNOSIS — Z79.82 LONG TERM (CURRENT) USE OF ASPIRIN: ICD-10-CM

## 2018-03-06 DIAGNOSIS — E11.9 TYPE 2 DIABETES MELLITUS WITHOUT COMPLICATIONS: ICD-10-CM

## 2018-03-06 DIAGNOSIS — S40.921A UNSPECIFIED SUPERFICIAL INJURY OF RIGHT UPPER ARM, INITIAL ENCOUNTER: ICD-10-CM

## 2018-03-06 DIAGNOSIS — J44.9 CHRONIC OBSTRUCTIVE PULMONARY DISEASE, UNSPECIFIED: ICD-10-CM

## 2018-03-06 LAB
ALBUMIN SERPL ELPH-MCNC: 3.2 G/DL — LOW (ref 3.3–5)
ALP SERPL-CCNC: 163 U/L — HIGH (ref 40–120)
ALT FLD-CCNC: 43 U/L — SIGNIFICANT CHANGE UP (ref 12–78)
ANION GAP SERPL CALC-SCNC: 6 MMOL/L — SIGNIFICANT CHANGE UP (ref 5–17)
APTT BLD: 28.5 SEC — SIGNIFICANT CHANGE UP (ref 27.5–37.4)
AST SERPL-CCNC: 40 U/L — HIGH (ref 15–37)
BASOPHILS # BLD AUTO: 0.2 K/UL — SIGNIFICANT CHANGE UP (ref 0–0.2)
BASOPHILS NFR BLD AUTO: 1.6 % — SIGNIFICANT CHANGE UP (ref 0–2)
BILIRUB SERPL-MCNC: 0.3 MG/DL — SIGNIFICANT CHANGE UP (ref 0.2–1.2)
BUN SERPL-MCNC: 18 MG/DL — SIGNIFICANT CHANGE UP (ref 7–23)
CALCIUM SERPL-MCNC: 8.8 MG/DL — SIGNIFICANT CHANGE UP (ref 8.5–10.1)
CHLORIDE SERPL-SCNC: 103 MMOL/L — SIGNIFICANT CHANGE UP (ref 96–108)
CO2 SERPL-SCNC: 30 MMOL/L — SIGNIFICANT CHANGE UP (ref 22–31)
CREAT SERPL-MCNC: 0.86 MG/DL — SIGNIFICANT CHANGE UP (ref 0.5–1.3)
EOSINOPHIL # BLD AUTO: 0.4 K/UL — SIGNIFICANT CHANGE UP (ref 0–0.5)
EOSINOPHIL NFR BLD AUTO: 4.3 % — SIGNIFICANT CHANGE UP (ref 0–6)
GLUCOSE SERPL-MCNC: 155 MG/DL — HIGH (ref 70–99)
HCT VFR BLD CALC: 36.6 % — SIGNIFICANT CHANGE UP (ref 34.5–45)
HGB BLD-MCNC: 11.4 G/DL — LOW (ref 11.5–15.5)
INR BLD: 1.02 RATIO — SIGNIFICANT CHANGE UP (ref 0.88–1.16)
LYMPHOCYTES # BLD AUTO: 2.1 K/UL — SIGNIFICANT CHANGE UP (ref 1–3.3)
LYMPHOCYTES # BLD AUTO: 20 % — SIGNIFICANT CHANGE UP (ref 13–44)
MCHC RBC-ENTMCNC: 26.9 PG — LOW (ref 27–34)
MCHC RBC-ENTMCNC: 31.1 GM/DL — LOW (ref 32–36)
MCV RBC AUTO: 86.4 FL — SIGNIFICANT CHANGE UP (ref 80–100)
MONOCYTES # BLD AUTO: 0.4 K/UL — SIGNIFICANT CHANGE UP (ref 0–0.9)
MONOCYTES NFR BLD AUTO: 4 % — SIGNIFICANT CHANGE UP (ref 2–14)
NEUTROPHILS # BLD AUTO: 7.3 K/UL — SIGNIFICANT CHANGE UP (ref 1.8–7.4)
NEUTROPHILS NFR BLD AUTO: 70.2 % — SIGNIFICANT CHANGE UP (ref 43–77)
PLATELET # BLD AUTO: 299 K/UL — SIGNIFICANT CHANGE UP (ref 150–400)
POTASSIUM SERPL-MCNC: 3.9 MMOL/L — SIGNIFICANT CHANGE UP (ref 3.5–5.3)
POTASSIUM SERPL-SCNC: 3.9 MMOL/L — SIGNIFICANT CHANGE UP (ref 3.5–5.3)
PROT SERPL-MCNC: 7.9 GM/DL — SIGNIFICANT CHANGE UP (ref 6–8.3)
PROTHROM AB SERPL-ACNC: 11 SEC — SIGNIFICANT CHANGE UP (ref 9.8–12.7)
RBC # BLD: 4.24 M/UL — SIGNIFICANT CHANGE UP (ref 3.8–5.2)
RBC # FLD: 15.7 % — HIGH (ref 10.3–14.5)
SODIUM SERPL-SCNC: 139 MMOL/L — SIGNIFICANT CHANGE UP (ref 135–145)
WBC # BLD: 10.4 K/UL — SIGNIFICANT CHANGE UP (ref 3.8–10.5)
WBC # FLD AUTO: 10.4 K/UL — SIGNIFICANT CHANGE UP (ref 3.8–10.5)

## 2018-03-06 PROCEDURE — 71045 X-RAY EXAM CHEST 1 VIEW: CPT | Mod: 26

## 2018-03-06 PROCEDURE — 76377 3D RENDER W/INTRP POSTPROCES: CPT | Mod: 26

## 2018-03-06 PROCEDURE — 72125 CT NECK SPINE W/O DYE: CPT | Mod: 26

## 2018-03-06 PROCEDURE — 93010 ELECTROCARDIOGRAM REPORT: CPT

## 2018-03-06 PROCEDURE — 73060 X-RAY EXAM OF HUMERUS: CPT | Mod: 26,RT

## 2018-03-06 PROCEDURE — 70450 CT HEAD/BRAIN W/O DYE: CPT | Mod: 26

## 2018-03-06 PROCEDURE — 73080 X-RAY EXAM OF ELBOW: CPT | Mod: 26,RT

## 2018-03-06 PROCEDURE — 73030 X-RAY EXAM OF SHOULDER: CPT | Mod: 26,RT

## 2018-03-06 PROCEDURE — 73502 X-RAY EXAM HIP UNI 2-3 VIEWS: CPT | Mod: 26

## 2018-03-06 PROCEDURE — 99285 EMERGENCY DEPT VISIT HI MDM: CPT

## 2018-03-06 PROCEDURE — 72192 CT PELVIS W/O DYE: CPT | Mod: 26

## 2018-03-06 PROCEDURE — 99285 EMERGENCY DEPT VISIT HI MDM: CPT | Mod: 25

## 2018-03-06 RX ORDER — DILTIAZEM HCL 120 MG
1 CAPSULE, EXT RELEASE 24 HR ORAL
Qty: 0 | Refills: 0 | COMMUNITY

## 2018-03-06 RX ORDER — ACETAMINOPHEN 500 MG
650 TABLET ORAL ONCE
Qty: 0 | Refills: 0 | Status: COMPLETED | OUTPATIENT
Start: 2018-03-06 | End: 2018-03-06

## 2018-03-06 RX ORDER — SODIUM CHLORIDE 9 MG/ML
1000 INJECTION INTRAMUSCULAR; INTRAVENOUS; SUBCUTANEOUS
Qty: 0 | Refills: 0 | Status: DISCONTINUED | OUTPATIENT
Start: 2018-03-06 | End: 2018-03-06

## 2018-03-06 RX ORDER — SODIUM CHLORIDE 9 MG/ML
3 INJECTION INTRAMUSCULAR; INTRAVENOUS; SUBCUTANEOUS ONCE
Qty: 0 | Refills: 0 | Status: COMPLETED | OUTPATIENT
Start: 2018-03-06 | End: 2018-03-06

## 2018-03-06 RX ADMIN — SODIUM CHLORIDE 125 MILLILITER(S): 9 INJECTION INTRAMUSCULAR; INTRAVENOUS; SUBCUTANEOUS at 02:35

## 2018-03-06 RX ADMIN — Medication 650 MILLIGRAM(S): at 02:35

## 2018-03-06 RX ADMIN — SODIUM CHLORIDE 3 MILLILITER(S): 9 INJECTION INTRAMUSCULAR; INTRAVENOUS; SUBCUTANEOUS at 02:10

## 2018-03-06 NOTE — ED ADULT NURSE REASSESSMENT NOTE - NS ED NURSE REASSESS COMMENT FT1
pt resting comfortably, vss. niece at bedside, updated on poc and dx by MD Denise. PIV removed, pt provided w/ discharge instructions. awaiting family to pick pt up w/ portable O2. safety maintained, will cont to monitor.

## 2018-03-06 NOTE — H&P ADULT - ASSESSMENT
82 y/o F PMHx significant for metastatic Lung Cancer (StageIV), severe COPD, NIDDM,   pancreatic Ca, CHF (HFrEF; LVEF 20-25%), and moderate mitral regurgitation who was   initially evaluated in the ED on 3/6 for a fall at home while walking to the bathroom with resultant right shoulder and pelvic pain and discharged and called back to the ED   as imaging performed officially revealed a fracture at the greater tuberosity in Right   Humeral head. In the ED the patient additionally c/o severe pelvic pain. XR right   Humerus -> revealed an incomplete cortical offset fracture through the greater   tuberosity of the right humeral head. CT Pelvis -> revealed a nondisplaced fracture of   the right inferior pubic ramus with comminuted fracture of the right superior pubic   ramus with associated contusion/hematoma of the right internal obturator muscle with stranding in the fat in the right pelvis suggesting small amount of hematoma. CT Head -> revealed a lesion in the left centrum semiovale/corona radiata with associated mild mass effect, and a right frontal lesion with vasogenic edema new since 8/2/2016. 84 y/o F PMHx significant for metastatic Lung Cancer (StageIV), severe COPD, NIDDM,   pancreatic Ca, CHF (HFrEF; LVEF 20-25%), and moderate mitral regurgitation who was   initially evaluated in the ED on 3/6 for a fall at home while walking to the bathroom with resultant right shoulder and pelvic pain and discharged and called back to the ED   as imaging performed officially revealed a fracture at the greater tuberosity in Right   Humeral head. In the ED the patient additionally c/o severe pelvic pain. XR right   Humerus -> revealed an incomplete cortical offset fracture through the greater   tuberosity of the right humeral head. CT Pelvis -> revealed a nondisplaced fracture of   the right inferior pubic ramus with comminuted fracture of the right superior pubic   ramus with associated contusion/hematoma of the right internal obturator muscle with stranding in the fat in the right pelvis suggesting small amount of hematoma. CT Head -> revealed a lesion in the left centrum semiovale/corona radiata with associated mild mass effect, and a right frontal lesion with vasogenic edema new since 8/2/2016.    #Falls in the Elderly with resultant..  #Right humeral head fracture  #Nondisplaced fracture of the right inferior pubic ramus  #Comminuted fracture of the right superior pubic ramus   #Contusion/hematoma of the right internal obturator muscle and right pelvis hematoma  ~admit to Medicine  ~f/u w/ Orthopedics  ~f/u w/ PT    #Stage IV Lung Ca w/   #New Lesion in the Left centrum semiovale/corona radiata with associated mild mass     effect and a right frontal lesion with associated vasogenic edema  ~f/u w/ Neurosurgery  f/u w/ Rad ONC  ~f/u w/ Heme/Onc in the am  ~given Decadron in the ED     3)CHF (HFrEF) - Compensated  ~cont. ASA 81mg po daily  ~cont. Spironolactone 25mg po daily  ~cont. Metoprolol ER 25mg po daily  ~cont. Enalapril 1.25mg po daily    4)COPD   ~cont. nebs prn    5)Vte ppx  ~cont. Heparin sq 84 y/o F PMHx significant for metastatic Lung Cancer (StageIV), severe COPD, NIDDM,   pancreatic Ca, CHF (HFrEF; LVEF 20-25%), and moderate mitral regurgitation who was   initially evaluated in the ED on 3/6 for a fall at home while walking to the bathroom with resultant right shoulder and pelvic pain and discharged and called back to the ED   as imaging performed officially revealed a fracture at the greater tuberosity in Right   Humeral head. In the ED the patient additionally c/o severe pelvic pain. XR right   Humerus -> revealed an incomplete cortical offset fracture through the greater   tuberosity of the right humeral head. CT Pelvis -> revealed a nondisplaced fracture of   the right inferior pubic ramus with comminuted fracture of the right superior pubic ramus with associated contusion/hematoma of the right internal obturator muscle with stranding in the fat in the right pelvis suggesting small amount of hematoma. CT Head -> revealed a lesion in the left centrum semiovale/corona radiata with associated mild mass effect, and a right frontal lesion with vasogenic edema new since 8/2/2016.    #Falls in the Elderly with resultant..  #Right humeral head fracture  #Nondisplaced fracture of the right inferior pubic ramus  #Comminuted fracture of the right superior pubic ramus   ~admit to Medicine  ~f/u w/ Orthopedics  ~f/u w/ PT    #Stage IV Lung Ca w/   #New Lesion in the Left centrum semiovale/corona radiata with associated mild mass     effect and a right frontal lesion with associated vasogenic edema  ~f/u w/ Neurosurgery  f/u w/ Rad ONC  ~f/u w/ Heme/Onc in the am  ~given Decadron in the ED    3)Contusion/hematoma of the right internal obturator muscle and right pelvis hematoma  ~serial CBCs  ~f/u w/ Orthopedics      4)CHF (HFrEF) - Compensated  ~cont. ASA 81mg po daily  ~cont. Spironolactone 25mg po daily  ~cont. Metoprolol ER 25mg po daily  ~cont. Enalapril 1.25mg po daily    5)COPD   ~cont. nebs prn    6)Vte ppx  ~cont. Heparin sq

## 2018-03-06 NOTE — H&P ADULT - PMH
Chronic obstructive pulmonary disease, unspecified COPD type    Chronic systolic congestive heart failure    Diabetes mellitus  Type 2  Malignant neoplasm of lung, unspecified laterality, unspecified part of lung  Stage 4  Mitral regurgitation    Pancreatic cancer

## 2018-03-06 NOTE — ED PROVIDER NOTE - PROGRESS NOTE DETAILS
pt told of results of CT.  pt states had PET scan on 3/2/18 to check if lung CA spread and is awaiting results.   pt agree with plan for d/c home with f/u with her oncologist Dr Waller for further treatment messages left for family.  awaiting call back for pt p/u

## 2018-03-06 NOTE — ED PROVIDER NOTE - CARE PLAN
Principal Discharge DX:	Metastatic lung carcinoma  Secondary Diagnosis:	Humerus fracture Principal Discharge DX:	Pelvic fracture  Secondary Diagnosis:	Humerus fracture  Secondary Diagnosis:	Metastatic lung carcinoma

## 2018-03-06 NOTE — H&P ADULT - NSHPPHYSICALEXAM_GEN_ALL_CORE
Vital Signs Last 24 Hrs  T(C): 37 (06 Mar 2018 06:24), Max: 37.2 (06 Mar 2018 01:30)  T(F): 98.6 (06 Mar 2018 06:24), Max: 98.9 (06 Mar 2018 01:30)  HR: 115 (06 Mar 2018 18:05) (93 - 115)  BP: 129/77 (06 Mar 2018 18:05) (92/57 - 129/77)  RR: 18 (06 Mar 2018 18:05) (16 - 18)  SpO2: 93% (06 Mar 2018 18:05) (93% - 100%)

## 2018-03-06 NOTE — ED ADULT TRIAGE NOTE - CHIEF COMPLAINT QUOTE
Pt presents to the ED c/o right groin pain. Pt was evaluated in ED yesterday s/p fall, on blood thinners. Pt was d/c yesterday and called today stating there was suspicion for right humerus fracture. Pt has a hx of stage 4 metastatic andre ca with mets to pancreas and brain. Pt lives at home with niece

## 2018-03-06 NOTE — ED PROVIDER NOTE - MEDICAL DECISION MAKING DETAILS
patient to be admitted for inpatient management of new brain mets from primary lung cancer. patient to be restarted and chemotherapy and evaluation by radiation oncologist. patient to be admitted for inpatient management of pelvic fracture with inability to ambulate. also with new brain mets from primary lung cancer. patient sent by oncologist to be restarted and chemotherapy and evaluation by radiation oncologist.

## 2018-03-06 NOTE — ED PROVIDER NOTE - PROGRESS NOTE DETAILS
Fede Puga: spoke with Dr. Arguelles, resident will come to ED to evaluate for possible humeral head fracture. pelvic fracture discussed with ortho resident. they will evaluate patient shortly.

## 2018-03-06 NOTE — ED ADULT NURSE NOTE - NS PRO AD PATIENT TYPE
pt states she is considering DNR. Family states they are concerned.  PT and family will discuss./Health Care Proxy (HCP)

## 2018-03-06 NOTE — ED PROVIDER NOTE - PMH
Chronic obstructive pulmonary disease, unspecified COPD type    Diabetes mellitus    Malignant neoplasm of lung, unspecified laterality, unspecified part of lung

## 2018-03-06 NOTE — ED ADULT TRIAGE NOTE - NS ED NURSE DIRECT TO ROOM YN
Yes S Plasty Text: Given the location and shape of the defect, and the orientation of relaxed skin tension lines, an S-plasty was deemed most appropriate for repair.  Using a sterile surgical marker, the appropriate outline of the S-plasty was drawn, incorporating the defect and placing the expected incisions within the relaxed skin tension lines where possible.  The area thus outlined was incised deep to adipose tissue with a #15 scalpel blade.  The skin margins were undermined to an appropriate distance in all directions utilizing iris scissors. The skin flaps were advanced over the defect.  The opposing margins were then approximated with interrupted buried subcutaneous sutures.

## 2018-03-06 NOTE — H&P ADULT - HISTORY OF PRESENT ILLNESS
84 y/o F PMHx significant for metastatic Lung Cancer (StageIV), severe COPD, NIDDM,   pancreatic Ca, CHF (HFrEF; LVEF 20-25%), and moderate mitral regurgitation who was   initially evaluated in the ED on 3/6 for a fall at home while walking to the bathroom with resultant right shoulder and pelvic pain and discharged and called back to the ED   as imaging performed officially revealed a fracture at the greater tuberosity in Right   Humeral head. In the ED the patient additionally c/o severe pelvic pain. XR right   Humerus -> revealed an incomplete cortical offset fracture through the greater   tuberosity of the right humeral head. CT Pelvis -> revealed a nondisplaced fracture of   the right inferior pubic ramus with comminuted fracture of the right superior pubic   ramus with associated contusion/hematoma of the right internal obturator muscle with stranding in the fat in the right pelvis suggesting small amount of hematoma. CT Head -> revealed a lesion in the left centrum semiovale/corona radiata with associated mild mass effect, and a right frontal lesion with vasogenic edema new since 8/2/2016.

## 2018-03-06 NOTE — ED POST DISCHARGE NOTE - RESULT SUMMARY
Dr. Almanzar from Radiology called for Incomplete Fx at greater tuberosity in Right Humeral head.  Pt. called.  Yina Heaton who is patient;'s Caregiver answered phone.  She had accompanied pt. to ED.  Pt is with her.  I informed them of the Radiology results and that Sling and Ortho F/U would be needed.  Dr. Ramírez details given.   Dave reports pt. is unable to stand and walk due to pain in groin / R hip.  No x-rays of LE were performed.   Pt and shannajuana will most likely return return to ED today.  MACKENZIE robles Pa-C

## 2018-03-06 NOTE — ED ADULT NURSE NOTE - OBJECTIVE STATEMENT
pt reports tripping and falling in bathroom this evening, unwitnessed, - LOC, striking head on ground. denies headache/blurry vision. on ASA. c/o R hip, R arm pain. no obvious deformities. trauma alert activated at triage, cancelled at bedside by MD Denise. pt a&ox3.

## 2018-03-06 NOTE — ED ADULT NURSE NOTE - OBJECTIVE STATEMENT
Pt BIBA with report of fall last night that was evaluated in ED. As per report, pt has possible humeral fx and groin pain.  Pt alert and able to answer questions appropriately at this time.  LUE pain noted with +radial pulse and fingers mobile.

## 2018-03-06 NOTE — ED PROVIDER NOTE - OBJECTIVE STATEMENT
82 y/o F w/ pmhx of lung cancer w/ metastases to pancreas and brain, presents to ED s/p fall last night c/o right groin pain and right shoulder pain. Pt unable to walk since fall. Recommended to come in for new metastatic lesions to brain by pt oncology radiologist. Currently on blood thinners.

## 2018-03-06 NOTE — ED PROVIDER NOTE - OBJECTIVE STATEMENT
84 y/o female in ED s/p trip and fall at home x 1 hr.  pt states was walking to bathroom when she tripped and landed on RUE.  pt denies any LOC, HA, neck pain, cp, sob, n/v/d/abd pain.   no meds taken for pain.

## 2018-03-06 NOTE — ED PROVIDER NOTE - CARE PLAN
Principal Discharge DX:	Fall, initial encounter  Secondary Diagnosis:	Malignant neoplasm of brain, unspecified location

## 2018-03-07 DIAGNOSIS — C34.90 MALIGNANT NEOPLASM OF UNSPECIFIED PART OF UNSPECIFIED BRONCHUS OR LUNG: ICD-10-CM

## 2018-03-07 DIAGNOSIS — C79.31 SECONDARY MALIGNANT NEOPLASM OF BRAIN: ICD-10-CM

## 2018-03-07 DIAGNOSIS — S32.9XXA FRACTURE OF UNSPECIFIED PARTS OF LUMBOSACRAL SPINE AND PELVIS, INITIAL ENCOUNTER FOR CLOSED FRACTURE: ICD-10-CM

## 2018-03-07 LAB
ALBUMIN SERPL ELPH-MCNC: 2.9 G/DL — LOW (ref 3.3–5)
ALP SERPL-CCNC: 125 U/L — HIGH (ref 40–120)
ALT FLD-CCNC: 31 U/L — SIGNIFICANT CHANGE UP (ref 12–78)
ANION GAP SERPL CALC-SCNC: 8 MMOL/L — SIGNIFICANT CHANGE UP (ref 5–17)
AST SERPL-CCNC: 26 U/L — SIGNIFICANT CHANGE UP (ref 15–37)
BASOPHILS # BLD AUTO: 0.05 K/UL — SIGNIFICANT CHANGE UP (ref 0–0.2)
BASOPHILS NFR BLD AUTO: 0.7 % — SIGNIFICANT CHANGE UP (ref 0–2)
BILIRUB SERPL-MCNC: 0.7 MG/DL — SIGNIFICANT CHANGE UP (ref 0.2–1.2)
BUN SERPL-MCNC: 20 MG/DL — SIGNIFICANT CHANGE UP (ref 7–23)
CALCIUM SERPL-MCNC: 8.3 MG/DL — LOW (ref 8.5–10.1)
CHLORIDE SERPL-SCNC: 105 MMOL/L — SIGNIFICANT CHANGE UP (ref 96–108)
CO2 SERPL-SCNC: 27 MMOL/L — SIGNIFICANT CHANGE UP (ref 22–31)
CREAT SERPL-MCNC: 0.7 MG/DL — SIGNIFICANT CHANGE UP (ref 0.5–1.3)
EOSINOPHIL # BLD AUTO: 0.19 K/UL — SIGNIFICANT CHANGE UP (ref 0–0.5)
EOSINOPHIL NFR BLD AUTO: 2.7 % — SIGNIFICANT CHANGE UP (ref 0–6)
GLUCOSE SERPL-MCNC: 130 MG/DL — HIGH (ref 70–99)
HCT VFR BLD CALC: 26.6 % — LOW (ref 34.5–45)
HCT VFR BLD CALC: 28.1 % — LOW (ref 34.5–45)
HCT VFR BLD CALC: 28.8 % — LOW (ref 34.5–45)
HCT VFR BLD CALC: 28.9 % — LOW (ref 34.5–45)
HGB BLD-MCNC: 8.2 G/DL — LOW (ref 11.5–15.5)
HGB BLD-MCNC: 8.7 G/DL — LOW (ref 11.5–15.5)
HGB BLD-MCNC: 8.8 G/DL — LOW (ref 11.5–15.5)
HGB BLD-MCNC: 8.9 G/DL — LOW (ref 11.5–15.5)
IMM GRANULOCYTES NFR BLD AUTO: 0.1 % — SIGNIFICANT CHANGE UP (ref 0–1.5)
LYMPHOCYTES # BLD AUTO: 1.43 K/UL — SIGNIFICANT CHANGE UP (ref 1–3.3)
LYMPHOCYTES # BLD AUTO: 20.5 % — SIGNIFICANT CHANGE UP (ref 13–44)
MAGNESIUM SERPL-MCNC: 1.9 MG/DL — SIGNIFICANT CHANGE UP (ref 1.6–2.6)
MCHC RBC-ENTMCNC: 26.6 PG — LOW (ref 27–34)
MCHC RBC-ENTMCNC: 26.7 PG — LOW (ref 27–34)
MCHC RBC-ENTMCNC: 26.8 PG — LOW (ref 27–34)
MCHC RBC-ENTMCNC: 26.9 PG — LOW (ref 27–34)
MCHC RBC-ENTMCNC: 30.6 GM/DL — LOW (ref 32–36)
MCHC RBC-ENTMCNC: 30.8 GM/DL — LOW (ref 32–36)
MCHC RBC-ENTMCNC: 30.8 GM/DL — LOW (ref 32–36)
MCHC RBC-ENTMCNC: 31 GM/DL — LOW (ref 32–36)
MCV RBC AUTO: 86.4 FL — SIGNIFICANT CHANGE UP (ref 80–100)
MCV RBC AUTO: 87 FL — SIGNIFICANT CHANGE UP (ref 80–100)
MCV RBC AUTO: 87 FL — SIGNIFICANT CHANGE UP (ref 80–100)
MCV RBC AUTO: 87.3 FL — SIGNIFICANT CHANGE UP (ref 80–100)
MONOCYTES # BLD AUTO: 0.69 K/UL — SIGNIFICANT CHANGE UP (ref 0–0.9)
MONOCYTES NFR BLD AUTO: 9.9 % — SIGNIFICANT CHANGE UP (ref 2–14)
NEUTROPHILS # BLD AUTO: 4.59 K/UL — SIGNIFICANT CHANGE UP (ref 1.8–7.4)
NEUTROPHILS NFR BLD AUTO: 66.1 % — SIGNIFICANT CHANGE UP (ref 43–77)
NRBC # BLD: 0 /100 WBCS — SIGNIFICANT CHANGE UP (ref 0–0)
PLATELET # BLD AUTO: 172 K/UL — SIGNIFICANT CHANGE UP (ref 150–400)
PLATELET # BLD AUTO: 198 K/UL — SIGNIFICANT CHANGE UP (ref 150–400)
PLATELET # BLD AUTO: 213 K/UL — SIGNIFICANT CHANGE UP (ref 150–400)
PLATELET # BLD AUTO: 214 K/UL — SIGNIFICANT CHANGE UP (ref 150–400)
POTASSIUM SERPL-MCNC: 4.1 MMOL/L — SIGNIFICANT CHANGE UP (ref 3.5–5.3)
POTASSIUM SERPL-SCNC: 4.1 MMOL/L — SIGNIFICANT CHANGE UP (ref 3.5–5.3)
PROT SERPL-MCNC: 6.9 GM/DL — SIGNIFICANT CHANGE UP (ref 6–8.3)
RBC # BLD: 3.08 M/UL — LOW (ref 3.8–5.2)
RBC # BLD: 3.23 M/UL — LOW (ref 3.8–5.2)
RBC # BLD: 3.3 M/UL — LOW (ref 3.8–5.2)
RBC # BLD: 3.32 M/UL — LOW (ref 3.8–5.2)
RBC # FLD: 16.2 % — HIGH (ref 10.3–14.5)
RBC # FLD: 16.2 % — HIGH (ref 10.3–14.5)
RBC # FLD: 16.3 % — HIGH (ref 10.3–14.5)
RBC # FLD: 16.3 % — HIGH (ref 10.3–14.5)
SODIUM SERPL-SCNC: 140 MMOL/L — SIGNIFICANT CHANGE UP (ref 135–145)
WBC # BLD: 6.96 K/UL — SIGNIFICANT CHANGE UP (ref 3.8–10.5)
WBC # BLD: 8.33 K/UL — SIGNIFICANT CHANGE UP (ref 3.8–10.5)
WBC # BLD: 8.47 K/UL — SIGNIFICANT CHANGE UP (ref 3.8–10.5)
WBC # BLD: 8.91 K/UL — SIGNIFICANT CHANGE UP (ref 3.8–10.5)
WBC # FLD AUTO: 6.96 K/UL — SIGNIFICANT CHANGE UP (ref 3.8–10.5)
WBC # FLD AUTO: 8.33 K/UL — SIGNIFICANT CHANGE UP (ref 3.8–10.5)
WBC # FLD AUTO: 8.47 K/UL — SIGNIFICANT CHANGE UP (ref 3.8–10.5)
WBC # FLD AUTO: 8.91 K/UL — SIGNIFICANT CHANGE UP (ref 3.8–10.5)

## 2018-03-07 PROCEDURE — 76377 3D RENDER W/INTRP POSTPROCES: CPT | Mod: 26

## 2018-03-07 PROCEDURE — 70553 MRI BRAIN STEM W/O & W/DYE: CPT | Mod: 26

## 2018-03-07 PROCEDURE — 73200 CT UPPER EXTREMITY W/O DYE: CPT | Mod: 26,RT

## 2018-03-07 RX ORDER — ONDANSETRON 8 MG/1
4 TABLET, FILM COATED ORAL EVERY 6 HOURS
Qty: 0 | Refills: 0 | Status: DISCONTINUED | OUTPATIENT
Start: 2018-03-07 | End: 2018-03-16

## 2018-03-07 RX ORDER — METOPROLOL TARTRATE 50 MG
50 TABLET ORAL DAILY
Qty: 0 | Refills: 0 | Status: DISCONTINUED | OUTPATIENT
Start: 2018-03-07 | End: 2018-03-16

## 2018-03-07 RX ORDER — ACETAMINOPHEN 500 MG
650 TABLET ORAL EVERY 6 HOURS
Qty: 0 | Refills: 0 | Status: DISCONTINUED | OUTPATIENT
Start: 2018-03-07 | End: 2018-03-16

## 2018-03-07 RX ORDER — OXYCODONE HYDROCHLORIDE 5 MG/1
5 TABLET ORAL EVERY 6 HOURS
Qty: 0 | Refills: 0 | Status: DISCONTINUED | OUTPATIENT
Start: 2018-03-07 | End: 2018-03-08

## 2018-03-07 RX ORDER — DILTIAZEM HCL 120 MG
1 CAPSULE, EXT RELEASE 24 HR ORAL
Qty: 0 | Refills: 0 | COMMUNITY

## 2018-03-07 RX ORDER — DOCUSATE SODIUM 100 MG
100 CAPSULE ORAL THREE TIMES A DAY
Qty: 0 | Refills: 0 | Status: DISCONTINUED | OUTPATIENT
Start: 2018-03-07 | End: 2018-03-16

## 2018-03-07 RX ORDER — ATORVASTATIN CALCIUM 80 MG/1
10 TABLET, FILM COATED ORAL AT BEDTIME
Qty: 0 | Refills: 0 | Status: DISCONTINUED | OUTPATIENT
Start: 2018-03-07 | End: 2018-03-16

## 2018-03-07 RX ORDER — METFORMIN HYDROCHLORIDE 850 MG/1
0 TABLET ORAL
Qty: 0 | Refills: 0 | COMMUNITY

## 2018-03-07 RX ORDER — OXYCODONE HYDROCHLORIDE 5 MG/1
2.5 TABLET ORAL EVERY 6 HOURS
Qty: 0 | Refills: 0 | Status: DISCONTINUED | OUTPATIENT
Start: 2018-03-07 | End: 2018-03-08

## 2018-03-07 RX ORDER — DEXAMETHASONE 0.5 MG/5ML
10 ELIXIR ORAL ONCE
Qty: 0 | Refills: 0 | Status: COMPLETED | OUTPATIENT
Start: 2018-03-07 | End: 2018-03-07

## 2018-03-07 RX ORDER — DEXAMETHASONE 0.5 MG/5ML
4 ELIXIR ORAL
Qty: 0 | Refills: 0 | Status: DISCONTINUED | OUTPATIENT
Start: 2018-03-07 | End: 2018-03-10

## 2018-03-07 RX ORDER — SPIRONOLACTONE 25 MG/1
25 TABLET, FILM COATED ORAL DAILY
Qty: 0 | Refills: 0 | Status: DISCONTINUED | OUTPATIENT
Start: 2018-03-07 | End: 2018-03-16

## 2018-03-07 RX ORDER — ASPIRIN/CALCIUM CARB/MAGNESIUM 324 MG
81 TABLET ORAL DAILY
Qty: 0 | Refills: 0 | Status: DISCONTINUED | OUTPATIENT
Start: 2018-03-07 | End: 2018-03-16

## 2018-03-07 RX ORDER — SENNA PLUS 8.6 MG/1
2 TABLET ORAL AT BEDTIME
Qty: 0 | Refills: 0 | Status: DISCONTINUED | OUTPATIENT
Start: 2018-03-07 | End: 2018-03-16

## 2018-03-07 RX ADMIN — Medication 4 MILLIGRAM(S): at 18:53

## 2018-03-07 RX ADMIN — Medication 102 MILLIGRAM(S): at 05:09

## 2018-03-07 RX ADMIN — Medication 4 MILLIGRAM(S): at 23:31

## 2018-03-07 RX ADMIN — Medication 4 MILLIGRAM(S): at 13:43

## 2018-03-07 RX ADMIN — Medication 81 MILLIGRAM(S): at 13:42

## 2018-03-07 RX ADMIN — Medication 50 MILLIGRAM(S): at 05:36

## 2018-03-07 RX ADMIN — Medication 1.25 MILLIGRAM(S): at 05:36

## 2018-03-07 RX ADMIN — SPIRONOLACTONE 25 MILLIGRAM(S): 25 TABLET, FILM COATED ORAL at 05:36

## 2018-03-07 RX ADMIN — ATORVASTATIN CALCIUM 10 MILLIGRAM(S): 80 TABLET, FILM COATED ORAL at 21:13

## 2018-03-07 NOTE — CONSULT NOTE ADULT - PROBLEM SELECTOR RECOMMENDATION 2
s/p previous prophylactic cranial RT  now has 2 small mets  for stereotactic RT as per Dr Osorio s/p previous prophylactic cranial RT  now has 2 small mets  for stereotactic RT as per Dr Osorio  shall consider Nivolumab as immunotherapy eventually.  Dexamethasone.

## 2018-03-07 NOTE — CONSULT NOTE ADULT - ASSESSMENT
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   I reviewed imaging and discussed her case with Dr. Waller her med onc.  No role for chemotx as no systemic dz. Following discharge pt should follow up with me in office as she is a candidate for Cyberknife radiosurgery.   Discussed plan with pt.

## 2018-03-07 NOTE — CONSULT NOTE ADULT - SUBJECTIVE AND OBJECTIVE BOX
HPI:  84 y/o F PMHx significant for metastatic Lung Cancer (StageIV), severe COPD, NIDDM,   pancreatic Ca, CHF (HFrEF; LVEF 20-25%), and moderate mitral regurgitation who was   initially evaluated in the ED on 3/6 for a fall at home.  Per pt report while going to bathroom pt fell landing on RUE / Right side with resultant right shoulder and pelvic pain.  Pt assessed and d/cd home.    Pt called back to the ED as imaging performed officially revealed a fracture at the greater tuberosity in Right Humeral head and a nondisplaced fracture of the right inferior pubic ramus with comminuted fracture of the right superior pubic   ramus with associated contusion/hematoma of the right internal obturator muscle with stranding in the fat in the right pelvis suggesting small amount of hematoma.   CT Head -> revealed a lesion in the left centrum semiovale/corona radiata with associated mild mass effect, and a right frontal lesion with vasogenic edema new since 8/2/2016, warranting a neurosurgical evaluation.        PAST MEDICAL & SURGICAL HISTORY:  Pancreatic cancer  Mitral regurgitation  Chronic systolic congestive heart failure  Malignant neoplasm of lung, unspecified laterality, unspecified part of lung: Stage 4  Chronic obstructive pulmonary disease, unspecified COPD type  Diabetes mellitus: Type 2  No significant past surgical history      FAMILY HISTORY:  No pertinent family history in first degree relatives  Noncontributory     Social Hx:  Nonsmoker, no drug or alcohol use  Allergies    No Known Allergies  Intolerances    MEDICATIONS  (STANDING):  aspirin enteric coated 81 milliGRAM(s) Oral daily  atorvastatin 10 milliGRAM(s) Oral at bedtime  dexamethasone  Injectable 4 milliGRAM(s) IV Push four times a day  dexamethasone  IVPB 10 milliGRAM(s) IV Intermittent once  enalapril 1.25 milliGRAM(s) Oral daily  metoprolol succinate ER 50 milliGRAM(s) Oral daily  spironolactone 25 milliGRAM(s) Oral daily     ROS: Pertinent positives in HPI, all other ROS were reviewed and are negative.      Vital Signs Last 24 Hrs  T(C): 36.6 (06 Mar 2018 23:46), Max: 37 (06 Mar 2018 06:24)  T(F): 97.9 (06 Mar 2018 23:46), Max: 98.6 (06 Mar 2018 06:24)  HR: 99 (06 Mar 2018 23:46) (93 - 115)  BP: 102/55 (06 Mar 2018 23:46) (92/57 - 129/77)  BP(mean): --  RR: 18 (06 Mar 2018 23:46) (16 - 18)  SpO2: 100% (06 Mar 2018 23:46) (93% - 100%)    Labs:                        11.4   10.4  )-----------( 299      ( 06 Mar 2018 01:33 )             36.6     03-06    139  |  103  |  18  ----------------------------<  155<H>  3.9   |  30  |  0.86    Ca    8.8      06 Mar 2018 01:33    TPro  7.9  /  Alb  3.2<L>  /  TBili  0.3  /  DBili  x   /  AST  40<H>  /  ALT  43  /  AlkPhos  163<H>  03-06    PT/INR - ( 06 Mar 2018 01:33 )   PT: 11.0 sec;   INR: 1.02 ratio    PTT - ( 06 Mar 2018 01:33 )  PTT:28.5 sec    Radiology report:  - CT head:  Indeterminate lesion in the left centrum semiovale/corona   radiata with associated mild mass effect, which may contain blood   products/debris. Suspect right frontal lesion with vasogenic edema. These   findings appear new since 8/2/2016 and raises suspicion for intracranial   neoplasm/metastasis. Contrast-enhanced MRI would be helpful for further   evaluation.    - CT Cspine: Cervical spine CT: Diffuse osteopenia without obvious fracture or   traumatic malalignment in the cervical spine. If clinically indicated,   MRI may be obtained for further evaluation.  Multilevel degenerative changes of the cervical spine. Heterogeneous thyroid gland, which can be further characterized on a   nonemergent ultrasound.      Physical Exam:  Constitutional: Awake / alert  HEENT: PERRLA, EOMI  Respiratory: Breath sounds are clear bilaterally  Cardiovascular: S1 and S2, regular rhythm  Gastrointestinal: Soft, NT/ND  Extremities:  no edema  Musculoskeletal: no joint swelling/tenderness, no abnormal movements    Neurological Exam:  HF: A x O x 3, appropriately interactive, normal affect, speech fluent, no aphasia or paraphasic errors. Naming /repetition intact   CN: PERRL, EOMI, VFF, facial sensation normal, no NLFD, tongue midline  Motor: No pronator drift, Strength 5/5 in all 4 ext, normal bulk and tone, no tremor, rigidity or bradykinesia  Sens: Intact to light touch  Reflexes: Symmetric and normal, downgoing toes b/l  Coord:  No FNFA, dysmetria, KIMMIE intact       A/P: HPI:  Pleasant and conversive 84 y/o F PMHx significant for metastatic Lung Cancer (StageIV), severe COPD, NIDDM, pancreatic Ca, CHF (HFrEF; LVEF 20-25%), and moderate mitral regurgitation who was initially evaluated in the ED. Pt fell on 3/6 while going to bathroom landing on right side / with c/o right shoulder and pelvic pain. + Hx of baby ASA use.  Pt denies LOC or head trauma but pt has ecchymosis noted above right orbit.   Pt on 3/6 sent home from ER after evaluation, but official imaging reports revealed R Humeral head fxr and Pelvic fracture.  Pt called back to the ED 3/7/18 as imaging performed officially revealed a fracture at the greater tuberosity in Right Humeral head and a nondisplaced fracture of the right inferior pubic ramus with comminuted fracture of the right superior pubic ramus.  Along with the pelvic fracture is associated contusion/hematoma of the right internal obturator muscle with stranding in the fat in the right pelvis.  Groin sensorium remains intact, no Bowel or bladder incontinence.  CT Head -> revealed a lesion in the left centrum semiovale/corona radiata with associated mild mass effect, and a right frontal lesion with vasogenic edema new since 2016, warranting a neurosurgical evaluation.  Pt claims she has recently had a PET scan.  Neurosurgery requesting medical team to obtain past reports of ?RT to brain and latest PET scan results, along with MRI of brain +/-.  Pt claims she is scheduled for new round of chemo and has a port already in place.    Pertinant ROS: No HA's, No vision changes, no nausea or vomitting, no c/o neck pain.  'Pt claims groin area hurts the most re: her pain'     PAST MEDICAL & SURGICAL HISTORY:  Pancreatic cancer  Mitral regurgitation  Chronic systolic congestive heart failure  Malignant neoplasm of lung, unspecified laterality, unspecified part of lung: Stage 4  Chronic obstructive pulmonary disease, unspecified COPD type  Diabetes mellitus: Type 2  No significant past surgical history    FAMILY HISTORY:  No pertinent family history in first degree relatives  Daughter in [place]  Son passed 3 years ago    at age 36  Lives with Niece / Nephew and is a long term resident of [place] 83 years.     Social Hx:  Nonsmoker, no drug or alcohol use  Allergies: NKDA    MEDICATIONS  (ACTIVE / STANDING):  aspirin enteric coated 81 milliGRAM(s) Oral daily  atorvastatin 10 milliGRAM(s) Oral at bedtime  dexamethasone  Injectable 4 milliGRAM(s) IV Push four times a day  dexamethasone  IVPB 10 milliGRAM(s) IV Intermittent once  enalapril 1.25 milliGRAM(s) Oral daily  metoprolol succinate ER 50 milliGRAM(s) Oral daily  spironolactone 25 milliGRAM(s) Oral daily     ROS: Pertinent positives in HPI, all other ROS were reviewed and are negative.      Vital Signs Last 24 Hrs  T(C): 36.6 (06 Mar 2018 23:46), Max: 37 (06 Mar 2018 06:24)  T(F): 97.9 (06 Mar 2018 23:46), Max: 98.6 (06 Mar 2018 06:24)  HR: 99 (06 Mar 2018 23:46) (93 - 115)  BP: 102/55 (06 Mar 2018 23:46) (92/57 - 129/77)  BP(mean): --  RR: 18 (06 Mar 2018 23:46) (16 - 18)  SpO2: 100% (06 Mar 2018 23:46) (93% - 100%)    Labs:                        11.4   10.4  )-----------( 299      ( 06 Mar 2018 01:33 )             36.6     03-06    139  |  103  |  18  ----------------------------<  155<H>  3.9   |  30  |  0.86    Ca    8.8      06 Mar 2018 01:33    TPro  7.9  /  Alb  3.2<L>  /  TBili  0.3  /  DBili  x   /  AST  40<H>  /  ALT  43  /  AlkPhos  163<H>  03-06    PT/INR - ( 06 Mar 2018 01:33 )   PT: 11.0 sec;   INR: 1.02 ratio    PTT - ( 06 Mar 2018 01:33 )  PTT:28.5 sec    Radiology report:  - CT head:  Indeterminate lesion in the left centrum semiovale/corona   radiata with associated mild mass effect, which may contain blood   products/debris. Suspect right frontal lesion with vasogenic edema. These   findings appear new since 2016 and raises suspicion for intracranial   neoplasm/metastasis. Contrast-enhanced MRI would be helpful for further   evaluation.    - CT Cspine: Cervical spine CT: Diffuse osteopenia without obvious fracture or   traumatic malalignment in the cervical spine. If clinically indicated,   MRI may be obtained for further evaluation.  Multilevel degenerative changes of the cervical spine. Heterogeneous thyroid gland, which can be further characterized on a   nonemergent ultrasound.      Physical Exam:  Constitutional: Awake / alert  HEENT: PERRLA, EOMI  Respiratory: Breath sounds are clear bilaterally  Cardiovascular: S1 and S2, regular rhythm  Gastrointestinal: Soft, NT/ND  Extremities:  Limited RUE and RLE secondary to pelvic fracture.  Musculoskeletal: R arm in sling/restrained. Pelvic fxr limiting RLE ROM as pt has leg internally rotated.    Neurological Exam:  HF: A x O x 3 (Year 2018, president Char, [facility], recent events). Pt is appropriately interactive, normal affect, speech fluent, no aphasia or paraphasic errors. Naming /repetition intact   CN: PERRL, EOMI, VFF intact, facial sensation normal, no NLFD, tongue midline.   Motor: No pronator drift in LUE.  LUE Strength 5/5 throughout.  RUE splinted in sling move wrist/fingers/  / intrins appropriately and shows 2 fingers.   RLE Internally rotated.  + weakness throughout, IP 2/5 pain limiting, Hams/Quads 2/5. DF 4-/5, PF 4-/5. Sensorium intact to LT throughout  LLE 4+/5 throughout, IP/H/Q/DF/PF 4+/5. Sensorium intact to LT/PP throughout.  Reflexes: Symmetric and normal, downgoing toes b/l  Coord:  No FNFA, dysmetria with LUE.       A/P:  84 y/o F PMHx significant for metastatic Lung Cancer (StageIV), severe COPD, NIDDM, pancreatic Ca, CHF (HFrEF; LVEF 20-25%), and moderate mitral regurgitation who fell on 3/6 with resultant R Humeral head fxr, Pelvic fracture including superior and inf pubic rami.  Pt had orbital ecchymosis warranting a CTH showing 2 newly identified lesions compared with study from 2016.  Left Centrum Semiovale, and Right frontal with vasogenic edema.  Pt reports prior hx of RT to brain upon initial diagnosis of Lung CA in 2016.    - MRI of Brain +/- to eval intracranial lesions  - Decadron dosing initiated - may consider decreasing dose depending on patient tolerance / agitation levels.  - Ortho eval re: Pelvic fractures / re management & treatment  - obtain prior RT History (brain?) and latest PET scan results in am  - Neurologically stable and no urgent or emergent neurosurgical intervention warranted  - After MRI complete, Dr. Pearce to determine need for Anti-seizure prophylaxis  - Pt may continue ASA from cranial perspective  - DVT prophylaxis recommended  - D/w Dr. Pearce patients status in detail, who reviewed imaging studies.  [facility]  will continue to follow the patient accordingly and offer further recommendations after MRI +/- complete.  Many thanks for this consultation.

## 2018-03-07 NOTE — CONSULT NOTE ADULT - SUBJECTIVE AND OBJECTIVE BOX
84 yo female with stage 4 lung cancer, admitted following a fall and found to have intracranial metastatic disease and pelvic and R humerus fractures.  EXAM: right periorbital ecchymosis Pupils equal and reactive vision full to confrontation, alert and oriented, CN2-12 intact. Moves all extremities well.  MRI and CT brain reviewed, showed right frontal and left parietal metastatic disease with mild edema and no mass effect, except right leg where there is pain guarding.  No indications for surgical resection of brain lesions. Will discuss stereotactic radiosurgery with Elvie Osorio and Christin.

## 2018-03-07 NOTE — CONSULT NOTE ADULT - SUBJECTIVE AND OBJECTIVE BOX
83y Female RHD presents c/o R shoulder pain sp mechanical fall. Denies LOC. Denies numbness/tingling. Denies fever/chills. states also has right groin pain and difficulty ambulating. pt uses a cane at baseline and lives with her nephews at home. Pt has history of stage 4 lung ca with known mets to brain. No other complaints.    HEALTH ISSUES - PROBLEM Dx:        MEDICATIONS  (STANDING):  aspirin enteric coated 81 milliGRAM(s) Oral daily  atorvastatin 10 milliGRAM(s) Oral at bedtime  dexamethasone  Injectable 4 milliGRAM(s) IV Push four times a day  enalapril 1.25 milliGRAM(s) Oral daily  metoprolol succinate ER 50 milliGRAM(s) Oral daily  spironolactone 25 milliGRAM(s) Oral daily    Allergies    No Known Allergies    Intolerances                            8.9    6.96  )-----------( 213      ( 07 Mar 2018 04:49 )             28.9     07 Mar 2018 04:49    140    |  105    |  20     ----------------------------<  130    4.1     |  27     |  0.70     Ca    8.3        07 Mar 2018 04:49  Mg     1.9       07 Mar 2018 04:49    TPro  6.9    /  Alb  2.9    /  TBili  0.7    /  DBili  x      /  AST  26     /  ALT  31     /  AlkPhos  125    07 Mar 2018 04:49    PT/INR - ( 06 Mar 2018 01:33 )   PT: 11.0 sec;   INR: 1.02 ratio         PTT - ( 06 Mar 2018 01:33 )  PTT:28.5 sec  Vital Signs Last 24 Hrs  T(C): 36.8 (03-07-18 @ 05:01), Max: 36.8 (03-07-18 @ 05:01)  T(F): 98.2 (03-07-18 @ 05:01), Max: 98.2 (03-07-18 @ 05:01)  HR: 105 (03-07-18 @ 05:01) (93 - 115)  BP: 123/62 (03-07-18 @ 05:01) (92/57 - 129/77)  BP(mean): --  RR: 18 (03-07-18 @ 05:01) (16 - 18)  SpO2: 96% (03-07-18 @ 05:01) (93% - 100%)  Imaging: XR demonstrates R/L proximal humerus fracture    Physical Exam  Gen: NAD  R/L UE: Skin intact,  +ttp shoulder, +r/u/m/ain/pin, B/L 4-5th digit dpuytrens contracture, SILT, radial pulse intact, compartments soft/compressible, warm/well perfused    A/P: 83y Female with R proximal humerus fracture  Pain control  NWB RUE in sling, keep c/d/I   Pendulum exercises  Ice  Active movement of fingers/elbow encouraged  Ca/Vit D, outpt osteoporosis workup  FU CT Scan to evaluate for lesions and mets to humerus   All question answered  Plan discussed with Dr Elizabeth and agrees with above plan

## 2018-03-07 NOTE — CONSULT NOTE ADULT - SUBJECTIVE AND OBJECTIVE BOX
Patient is a 83y old  Female who presents with a chief complaint of Left Shoulder and Pelvic Pain after a fell      HPI:  84 y/o F PMHx significant for metastatic Lung Cancer (StageIV),  diagnosed in 9/2016 and treated with chemo with carboplatin with excellent response. also received prophylactic cranial RT  recurred in 8/2017 . retreated with carbo and etoposide with excellent response  Last PET CT in 2/21/2018 negative for progression    fall at home while walking to the bathroom with resultant right shoulder and pelvic pain and discharged and called back to the ED   as imaging performed officially revealed a fracture at the greater tuberosity in Right   the right inferior pubic ramus with comminuted fracture of the right superior pubic   ramus with associated contusion/hematoma of the right internal obturator muscle with stranding in the fat in the right pelvis suggesting small amount of hematoma.     CT Head -> revealed a lesion in the left centrum semiovale/corona radiata with associated mild mass effect, and a right frontal lesion with vasogenic edema new since 8/2/2016. 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      FAMILY HISTORY:  No pertinent family history in first degree relatives      Home Medications:  enalapril 2.5 mg oral tablet: 0.5 tab(s) orally once a day (07 Mar 2018 00:04)  metoprolol succinate 25 mg oral tablet, extended release: 2 tab(s) orally once a day (07 Mar 2018 00:04)      MEDICATIONS  (STANDING):  aspirin enteric coated 81 milliGRAM(s) Oral daily  atorvastatin 10 milliGRAM(s) Oral at bedtime  dexamethasone  Injectable 4 milliGRAM(s) IV Push four times a day  enalapril 1.25 milliGRAM(s) Oral daily  metoprolol succinate ER 50 milliGRAM(s) Oral daily  spironolactone 25 milliGRAM(s) Oral daily    MEDICATIONS  (PRN):  acetaminophen   Tablet 650 milliGRAM(s) Oral every 6 hours PRN For Temp greater than 38 C (100.4 F)  acetaminophen   Tablet. 650 milliGRAM(s) Oral every 6 hours PRN Mild Pain (1 - 3)  docusate sodium 100 milliGRAM(s) Oral three times a day PRN Constipation  ondansetron Injectable 4 milliGRAM(s) IV Push every 6 hours PRN Nausea  senna 2 Tablet(s) Oral at bedtime PRN Constipation      PHYSICAL EXAM:  Vital Signs Last 24 Hrs  T(C): 36.7 (07 Mar 2018 06:49), Max: 36.8 (07 Mar 2018 05:01)  T(F): 98 (07 Mar 2018 06:49), Max: 98.2 (07 Mar 2018 05:01)  HR: 93 (07 Mar 2018 06:49) (93 - 115)  BP: 98/64 (07 Mar 2018 06:49) (98/64 - 129/77)  BP(mean): --  RR: 18 (07 Mar 2018 06:49) (18 - 18)  SpO2: 93% (07 Mar 2018 06:49) (93% - 100%)      Gen:     alert and oriented  bruise right forehead  chest clear  right arm in splint          LABS:                        8.8    8.47  )-----------( 214      ( 07 Mar 2018 09:10 )             28.8     07 Mar 2018 04:49    140    |  105    |  20     ----------------------------<  130    4.1     |  27     |  0.70     Ca    8.3        07 Mar 2018 04:49  Mg     1.9       07 Mar 2018 04:49    TPro  6.9    /  Alb  2.9    /  TBili  0.7    /  DBili  x      /  AST  26     /  ALT  31     /  AlkPhos  125    07 Mar 2018 04:49    LIVER FUNCTIONS - ( 07 Mar 2018 04:49 )  Alb: 2.9 g/dL / Pro: 6.9 gm/dL / ALK PHOS: 125 U/L / ALT: 31 U/L / AST: 26 U/L / GGT: x           PT/INR - ( 06 Mar 2018 01:33 )   PT: 11.0 sec;   INR: 1.02 ratio         PTT - ( 06 Mar 2018 01:33 )  PTT:28.5 sec      RADIOLOGY & ADDITIONAL STUDIES:      Head CT: There is no large cortical infarct or midline shift. There is a   1.8 x 1.8 x 1.9 cm round heterogeneous density lesion with surrounding   lucency/edema in the left centrum semiovale/corona radiata with mild mass   effect on the underlying left posterior lateral ventricle, which may   contain blood products/debris. There is nonspecific lucency with possible   1.1 x 1.5 x 1.2 cm underlying lesion (602:99-38 and 601:25) in the right   frontal white matter, suspicious for vasogenic edema.       IMPRESSION:    1.  Mildly impacted right proximal humeral surgical neck fracture with   nondisplaced extension to the greater and lesser tuberosities  2.  No osteoblastic or osteolytic lesion though limited by decreased bone   mineralization. Hyperdensity within the proximal humeral diaphysis is   thought to represent hemorrhage.  3.  Circumferential right upper extremity soft tissue swelling.   4.  Degenerative changes at the acromioclavicular joint.  5.  Emphysematous changes partially imaged.      Impression: Fractures of the right superior and inferior pubic rami.  Associated contusion/hematoma of the right internal obturator muscle with   stranding in the fat in the right pelvis suggesting small amount of   hematoma.    PET CT 2/21/2018 MAR  no systemic mets

## 2018-03-07 NOTE — ED ADULT NURSE REASSESSMENT NOTE - NS ED NURSE REASSESS COMMENT FT1
Pt to be admitted. Pt resting comfortably in bed. no complaints. awaiting orders and available bed. comfort and safety maintained. Will continue to monitor

## 2018-03-07 NOTE — CONSULT NOTE ADULT - SUBJECTIVE AND OBJECTIVE BOX
Patient is a 83y old  Female who presents with a chief complaint of Left Shoulder and Pelvic Pain (06 Mar 2018 22:09)    HPI:  84 y/o F PMHx significant for metastatic Lung Cancer (StageIV).  She was treated for limited stage small cell lung cancer in September 2016 with chemoradiation and had excellent response. SHe received prophylactic whole brain xrt in Dec 2016 and developed progression of dz in chest in Aug 2017.   She was treated with carbo/vp16 and recent PET in Feb 2018 revealed so systemic dz.   She fell at home and in ER has pubic bone fracture (posttraumatic) and two new brain mets on CT brain. MRI brain pending.  Pt alert and aware and recalls slipping and falling. No evidence of seizure.     Pt has severe COPD, NIDDM,  CHF (HFrEF; LVEF 20-25%), and moderate mitral regurgitation who was   initially evaluated in the ED on 3/6 for a fall at home while walking to the bathroom with resultant right shoulder and pelvic pain and discharged and called back to the ED   as imaging performed officially revealed a fracture at the greater tuberosity in Right   Humeral head. In the ED the patient additionally c/o severe pelvic pain. XR right   Humerus -> revealed an incomplete cortical offset fracture through the greater   tuberosity of the right humeral head. CT Pelvis -> revealed a nondisplaced fracture of   the right inferior pubic ramus with comminuted fracture of the right superior pubic   ramus with associated contusion/hematoma of the right internal obturator muscle with stranding in the fat in the right pelvis suggesting small amount of hematoma. CT Head -> revealed a lesion in the left centrum semiovale/corona radiata with associated mild mass effect, and a right frontal lesion with vasogenic edema new since 8/2/2016. (06 Mar 2018 22:09)      PAST MEDICAL & SURGICAL HISTORY:  Pancreatic cancer  Mitral regurgitation  Chronic systolic congestive heart failure  Malignant neoplasm of lung, unspecified laterality, unspecified part of lung: Stage 4  Chronic obstructive pulmonary disease, unspecified COPD type  Diabetes mellitus: Type 2  No significant past surgical history      SOCIAL HISTORY:She lives at home with her niece Florina. + Tob previous    FAMILY HISTORY:  No pertinent family history in first degree relatives      MEDICATIONS  (STANDING):  aspirin enteric coated 81 milliGRAM(s) Oral daily  atorvastatin 10 milliGRAM(s) Oral at bedtime  dexamethasone  Injectable 4 milliGRAM(s) IV Push four times a day  enalapril 1.25 milliGRAM(s) Oral daily  metoprolol succinate ER 50 milliGRAM(s) Oral daily  spironolactone 25 milliGRAM(s) Oral daily    MEDICATIONS  (PRN):  acetaminophen   Tablet 650 milliGRAM(s) Oral every 6 hours PRN For Temp greater than 38 C (100.4 F)  acetaminophen   Tablet. 650 milliGRAM(s) Oral every 6 hours PRN Mild Pain (1 - 3)  docusate sodium 100 milliGRAM(s) Oral three times a day PRN Constipation  ondansetron Injectable 4 milliGRAM(s) IV Push every 6 hours PRN Nausea  senna 2 Tablet(s) Oral at bedtime PRN Constipation      PHYSICAL EXAM:  Vital Signs Last 24 Hrs  T(C): 36.7 (07 Mar 2018 06:49), Max: 36.8 (07 Mar 2018 05:01)  T(F): 98 (07 Mar 2018 06:49), Max: 98.2 (07 Mar 2018 05:01)  HR: 93 (07 Mar 2018 06:49) (93 - 115)  BP: 98/64 (07 Mar 2018 06:49) (98/64 - 129/77)  BP(mean): --  RR: 18 (07 Mar 2018 06:49) (18 - 18)  SpO2: 93% (07 Mar 2018 06:49) (93% - 100%)  Head: no alopecia or scars  Neck: no palpable lymphadenopathy  Lungs: Clear to ascultation bilaterally and no wheezes  Cardiac: normal S1, S2, no murmurs  Abdomen: soft, nontender with no ascites  Extremities: no peripheral edema, good pulses bilaterally  Neuro: A & O, CNs II-XII intact. Motor strength 5/5 throughout and strength 5/5 throughout. Ambulatory    LABS:  CBC Full  -  ( 07 Mar 2018 04:49 )  WBC Count : 6.96 K/uL  Hemoglobin : 8.9 g/dL  Hematocrit : 28.9 %  Platelet Count - Automated : 213 K/uL  Mean Cell Volume : 87.0 fl  Mean Cell Hemoglobin : 26.8 pg  Mean Cell Hemoglobin Concentration : 30.8 gm/dL  Auto Neutrophil # : 4.59 K/uL  Auto Lymphocyte # : 1.43 K/uL  Auto Monocyte # : 0.69 K/uL  Auto Eosinophil # : 0.19 K/uL  Auto Basophil # : 0.05 K/uL  Auto Neutrophil % : 66.1 %  Auto Lymphocyte % : 20.5 %  Auto Monocyte % : 9.9 %  Auto Eosinophil % : 2.7 %  Auto Basophil % : 0.7 %    03-07    140  |  105  |  20  ----------------------------<  130<H>  4.1   |  27  |  0.70    Ca    8.3<L>      07 Mar 2018 04:49  Mg     1.9     03-07    TPro  6.9  /  Alb  2.9<L>  /  TBili  0.7  /  DBili  x   /  AST  26  /  ALT  31  /  AlkPhos  125<H>  03-07      RADIOLOGY:  < from: CT Head No Cont (03.06.18 @ 02:06) >  EXAM:  CT CERVICAL SPINE                          EXAM:  CT BRAIN                            PROCEDURE DATE:  03/06/2018          INTERPRETATION:  Clinical indication: Fall. History of lung cancer. No   known metastasis to brain.    Technique: CT axial images of the head and cervical spine were obtained   without intravenous contrast. Computer-reconstructed coronal and sagittal   images were obtained.    Comparison: Head CT 8/2/2016.    Findings:     Head CT: There is no large cortical infarct or midline shift. There is a   1.8 x 1.8 x 1.9 cm round heterogeneous density lesion with surrounding   lucency/edema in the left centrum semiovale/corona radiata with mild mass   effect on the underlying left posterior lateral ventricle, which may   contain blood products/debris. There is nonspecific lucency with possible   1.1 x 1.5 x 1.2 cm underlying lesion (602:99-38 and 601:25) in the right   frontal white matter, suspicious for vasogenic edema.     Nonspecific periventricular and subcortical white matter lucencies   reflect in part chronic microvascular ischemic changes. Again noted are   small lucencies in the left thalamus and bilateral subinsular region,   which may represent age-indeterminate infarcts. Stable small lucency   along the inferior aspect of the right lentiform nucleus may represent   prominent perivascular space. There is mild cerebral volume loss with   commensurate ventricular dilatation.    There is no depressed skull fracture. There is minimal mucosal thickening  of the sinuses with partial opacification of ethmoid sinuses. The   tympanomastoid region is clear.      Cervical spine CT: There is diffuse osteopenia. A normal cervical   lordosis is maintained. There is no obvious fracture or traumatic   malalignment in the cervical spine. The vertebral body heights are   preserved in the cervical spine without a compression deformity. The   craniocervical junction, predentate and atlantodental space are intact.   There is minimal anterolisthesis of C7 on T1.    There are multilevel degenerative changes characterized by uncovertebral   degenerative change, disc osteophyte complex and facet arthropathy in the   cervical spine with resultant neural foraminal narrowing and spinal canal   stenosis.    The prevertebral soft tissue is within normal limits. There is no   hematoma in the visualized superficial soft tissue. Heterogeneous thyroid   gland with calcifications and indeterminate lesions. Partially   visualized, right subclavian approach Mediport.    Visualized lung apices: No pneumothorax. Extensive centrilobular   emphysema.    Impression:     Head CT: Indeterminate lesion in the left centrum semiovale/corona   radiata with associated mild mass effect, which may contain blood   products/debris. Suspect right frontal lesion with vasogenic edema. These   findings appear new since 8/2/2016 and raises suspicion for intracranial   neoplasm/metastasis. Contrast-enhanced MRI would be helpful for further   evaluation.    Paranasal sinus disease. Recommend clinical correlation to assess acute   sinusitis.    Cervical spine CT: Diffuse osteopenia without obvious fracture or   traumatic malalignment in the cervical spine. If clinically indicated,   MRI may be obtained for further evaluation.    Multilevel degenerative changes of the cervical spine.    Heterogeneous thyroid gland, which can be further characterized on a   nonemergent ultrasound.    Discussed with Dr. Lola BAUTISTA   This document has been electronically signed. Mar  6 2018  2:56AM

## 2018-03-07 NOTE — CONSULT NOTE ADULT - SUBJECTIVE AND OBJECTIVE BOX
83y Female RHD presents c/o R groin pain sp mechanical fall. Denies LOC. Denies numbness/tingling. Denies fever/chills. states also has right shoulder pain and difficulty ambulating. pt uses a cane at baseline and lives with her nephews at home. Pt has history of stage 4 lung ca with known mets to brain. No other complaints.  HEALTH ISSUES - PROBLEM Dx:        MEDICATIONS  (STANDING):  aspirin enteric coated 81 milliGRAM(s) Oral daily  atorvastatin 10 milliGRAM(s) Oral at bedtime  dexamethasone  Injectable 4 milliGRAM(s) IV Push four times a day  enalapril 1.25 milliGRAM(s) Oral daily  metoprolol succinate ER 50 milliGRAM(s) Oral daily  spironolactone 25 milliGRAM(s) Oral daily    Allergies    No Known Allergies    Intolerances                              8.9    6.96  )-----------( 213      ( 07 Mar 2018 04:49 )             28.9     07 Mar 2018 04:49    140    |  105    |  20     ----------------------------<  130    4.1     |  27     |  0.70     Ca    8.3        07 Mar 2018 04:49  Mg     1.9       07 Mar 2018 04:49    TPro  6.9    /  Alb  2.9    /  TBili  0.7    /  DBili  x      /  AST  26     /  ALT  31     /  AlkPhos  125    07 Mar 2018 04:49    PT/INR - ( 06 Mar 2018 01:33 )   PT: 11.0 sec;   INR: 1.02 ratio         PTT - ( 06 Mar 2018 01:33 )  PTT:28.5 sec    Imaging: CT Pelvis demonstrates R sup/inf pubic ramus fractures and some lesions seen over the proximal femur.    Vital Signs Last 24 Hrs  T(C): 36.7 (03-07-18 @ 06:49), Max: 36.8 (03-07-18 @ 05:01)  T(F): 98 (03-07-18 @ 06:49), Max: 98.2 (03-07-18 @ 05:01)  HR: 93 (03-07-18 @ 06:49) (93 - 115)  BP: 98/64 (03-07-18 @ 06:49) (92/57 - 129/77)  BP(mean): --  RR: 18 (03-07-18 @ 06:49) (16 - 18)  SpO2: 93% (03-07-18 @ 06:49) (93% - 100%)  Gen: NAD  B/L LE: Skin intact, +ttp R groin, no bony ttp elsewhere, able to SLR, no pain with passive leg raise, negative log roll, +ehl/fhl/ta/gs function, l2-s1 silt, dp/pt pulse intact, no calf ttp, compartments soft    A/P: 83y Female w R Sup/Inf Pubic Ramus Fracture  Pain control  DVT ppx  PT/OOBTC/WBAT with assistive devices as needed  FU labs/imaging  Fall precautions  Ca/Vit D  Medical management  No acute ortho surgical intervention  Will discuss with Dr Ramírez

## 2018-03-07 NOTE — ED ADULT NURSE REASSESSMENT NOTE - NS ED NURSE REASSESS COMMENT FT1
perineal and comfort care performed x1 prior to sending pt to .  pt comfortable with no c/o pain at this time

## 2018-03-07 NOTE — CONSULT NOTE ADULT - CONSULT REASON
Newly identified brain mets x 2 s/p fall with pelvic and R humeral head fracture Newly identified brain mets x 2 s/p fall with pelvic and R humeral head fracture.  Pt reports hx of RT to brain? 2 years ago, when initial Lung CA diagnosis was made (10 rounds)

## 2018-03-07 NOTE — CONSULT NOTE ADULT - PROBLEM SELECTOR RECOMMENDATION 9
s/p chemotherapy twice with currently no evidence of mets outside the brain on recent PET CT  no plan for chemotherapy s/p chemotherapy twice with currently no evidence of mets outside the brain on recent PET CT

## 2018-03-07 NOTE — ED ADULT NURSE REASSESSMENT NOTE - NS ED NURSE REASSESS COMMENT FT1
Pt resting comfortably in bed. Pt to be admitted and orders placed, awaiting available bed. Pt medicated with am medications as per md orders. No complaints at present time. Comfort and safety maintained. Arabella - care provided. Will continue to monitor.

## 2018-03-08 LAB
BUN SERPL-MCNC: 29 MG/DL — HIGH (ref 7–23)
CREAT SERPL-MCNC: 0.62 MG/DL — SIGNIFICANT CHANGE UP (ref 0.5–1.3)
FERRITIN SERPL-MCNC: 207 NG/ML — HIGH (ref 15–150)
FOLATE SERPL-MCNC: 7.9 NG/ML — SIGNIFICANT CHANGE UP (ref 4.8–24.2)
HCT VFR BLD CALC: 26.7 % — LOW (ref 34.5–45)
HCT VFR BLD CALC: 26.8 % — LOW (ref 34.5–45)
HGB BLD-MCNC: 8.3 G/DL — LOW (ref 11.5–15.5)
HGB BLD-MCNC: 8.3 G/DL — LOW (ref 11.5–15.5)
IRON SATN MFR SERPL: 15 % — SIGNIFICANT CHANGE UP (ref 14–50)
IRON SATN MFR SERPL: 36 UG/DL — SIGNIFICANT CHANGE UP (ref 30–160)
MCHC RBC-ENTMCNC: 26.8 PG — LOW (ref 27–34)
MCHC RBC-ENTMCNC: 26.9 PG — LOW (ref 27–34)
MCHC RBC-ENTMCNC: 31 GM/DL — LOW (ref 32–36)
MCHC RBC-ENTMCNC: 31.1 GM/DL — LOW (ref 32–36)
MCV RBC AUTO: 86.1 FL — SIGNIFICANT CHANGE UP (ref 80–100)
MCV RBC AUTO: 86.7 FL — SIGNIFICANT CHANGE UP (ref 80–100)
NRBC # BLD: 0 /100 WBCS — SIGNIFICANT CHANGE UP (ref 0–0)
NRBC # BLD: 0 /100 WBCS — SIGNIFICANT CHANGE UP (ref 0–0)
PLATELET # BLD AUTO: 195 K/UL — SIGNIFICANT CHANGE UP (ref 150–400)
PLATELET # BLD AUTO: 199 K/UL — SIGNIFICANT CHANGE UP (ref 150–400)
RBC # BLD: 3.09 M/UL — LOW (ref 3.8–5.2)
RBC # BLD: 3.1 M/UL — LOW (ref 3.8–5.2)
RBC # FLD: 16.2 % — HIGH (ref 10.3–14.5)
RBC # FLD: 16.3 % — HIGH (ref 10.3–14.5)
TIBC SERPL-MCNC: 240 UG/DL — SIGNIFICANT CHANGE UP (ref 220–430)
UIBC SERPL-MCNC: 204 UG/DL — SIGNIFICANT CHANGE UP (ref 110–370)
VIT B12 SERPL-MCNC: 237 PG/ML — SIGNIFICANT CHANGE UP (ref 232–1245)
WBC # BLD: 10.04 K/UL — SIGNIFICANT CHANGE UP (ref 3.8–10.5)
WBC # BLD: 11.11 K/UL — HIGH (ref 3.8–10.5)
WBC # FLD AUTO: 10.04 K/UL — SIGNIFICANT CHANGE UP (ref 3.8–10.5)
WBC # FLD AUTO: 11.11 K/UL — HIGH (ref 3.8–10.5)

## 2018-03-08 PROCEDURE — 73720 MRI LWR EXTREMITY W/O&W/DYE: CPT | Mod: 26,76,LT

## 2018-03-08 PROCEDURE — 72197 MRI PELVIS W/O & W/DYE: CPT | Mod: 26

## 2018-03-08 RX ADMIN — Medication 4 MILLIGRAM(S): at 18:23

## 2018-03-08 RX ADMIN — OXYCODONE HYDROCHLORIDE 2.5 MILLIGRAM(S): 5 TABLET ORAL at 06:06

## 2018-03-08 RX ADMIN — Medication 1.25 MILLIGRAM(S): at 05:42

## 2018-03-08 RX ADMIN — Medication 4 MILLIGRAM(S): at 05:44

## 2018-03-08 RX ADMIN — OXYCODONE HYDROCHLORIDE 2.5 MILLIGRAM(S): 5 TABLET ORAL at 06:36

## 2018-03-08 RX ADMIN — OXYCODONE HYDROCHLORIDE 5 MILLIGRAM(S): 5 TABLET ORAL at 15:16

## 2018-03-08 RX ADMIN — Medication 4 MILLIGRAM(S): at 23:01

## 2018-03-08 RX ADMIN — ATORVASTATIN CALCIUM 10 MILLIGRAM(S): 80 TABLET, FILM COATED ORAL at 21:14

## 2018-03-08 RX ADMIN — Medication 4 MILLIGRAM(S): at 11:50

## 2018-03-08 RX ADMIN — Medication 81 MILLIGRAM(S): at 11:50

## 2018-03-08 NOTE — CHART NOTE - NSCHARTNOTEFT_GEN_A_CORE
This SW spoke with pts daughter Madina ([phone]) to schedule a family meeting via phone as she resides ins [place]. Meeting scheduled for 9:00AM on Friday 3/9/18. Our team will continue to follow.

## 2018-03-08 NOTE — CONSULT NOTE ADULT - CONSULT REQUESTED DATE/TIME
06-Mar-2018
07-Mar-2018
07-Mar-2018 04:47
07-Mar-2018 06:55
07-Mar-2018 09:28
07-Mar-2018 12:00
07-Mar-2018 10:39

## 2018-03-08 NOTE — CONSULT NOTE ADULT - ASSESSMENT
83y old Female coming from home with hx of COPD on home O2 (4L), CHF (EF 20-25%), DM2 and metastatic Lung Cancer (Stage IV) dx 9/16 tx with carboplatin w/ excellent response and prophylactic whole brain xrt. This was followed by progression of dz in chest in Aug 2017 tx w/ carbo/vp16 with excellent response as well and recent PET in Feb 2018 w/ no progression. Pt now admitted 3/6 for mechanical fall at home w/ pelvic and shoulder pain, followed by ED visit, and called back for admission due to +imaging. Found to have anemia, CXR showing nondisplaced R humoral fracture, CT pelvis showing nondisplaced fracture of right inferior and superior rami w/ associated hematoma of R internal obturator muscle, and lastly CTH showing L centrum seminovale/radiata mass and R frontal lesion with some vasogenic edema. No surgical intervention needed for any finding and pt otherwise stable. Palliative care consulted to assist with establishing GOC.    1) Pain  - 2/2 to acute fractures  - controlled with oxycodone 2.5 mg q4h prn and backup of 5mg q4h prn, c/w this  - also c/w steroids as they are likely helping both intracranial process and bone pain  - will continue to monitor    2) Fractures  - ortho notes appreciated  - right arm in sling  - no surgical intervention otherwise recommended  - PT consult when able    3) Anemia  - likely 2/2 to hematoma s/p fall  - hgb appears stable thus far  -c/w monitoring    4) Stage IV lung ca with mets to brain  - neurosurgery consult appreciated, no surgical intervention  - onc and rad onc consults appreciated, plan for cyberknife and onc follow up for further intervention  - MRI pending for further eval  - recent PET with no further progression  - seems to have done very well with tx regimens in past    5) Debility  - seems to have been doing ok at home functionally  - walks with cane at baseline  - mechanical fall on wet floor  - PT eval when able    6) Prognosis  - guarded  - appears that pt still good candidate for disease-modifying therapy and tolerates this well. Has COPD, O2-dependent and CHF with poor EF at baseline, but still appears to be fairing ok despite this, though these comorbidities put her at increased risk for complications     7) GOC/Advanced Directives  - pt has capacity for decision making  - no HCP on file, hope to complete one prior to dc  - thus daughter serving as surrogate Ashia [phone]  - will address code status  - [facility] meeting scheduled for tomorrow at 9am with daughter via phone, remainder of loved ones also welcome to attend    Thank you for including us in Ms. Tavares's care. Will continue to follow with you.    Chris Fairbanks MD  Palliative Care Attending

## 2018-03-08 NOTE — CONSULT NOTE ADULT - SUBJECTIVE AND OBJECTIVE BOX
HPI: Ms. Tavares is an 83y old Female coming from home with hx of COPD on home O2 (4L), CHF (EF 20-25%), DM2 and metastatic Lung Cancer (Stage IV) dx  tx with carboplatin w/ excellent response and prophylactic whole brain xrt. This was followed by progression of dz in chest in Aug 2017 tx w/ carbo/vp16 with excellent response as well and recent PET in 2018 w/ no progression. Pt now admitted 3/6 for mechanical fall at home w/ pelvic and shoulder pain, followed by ED visit, and called back for admission due to +imaging. Found to have anemia, CXR showing nondisplaced R humoral fracture, CT pelvis showing nondisplaced fracture of right inferior and superior rami w/ associated hematoma of R internal obturator muscle, and lastly CTH showing L centrum seminovale/radiata mass and R frontal lesion with some vasogenic edema. No surgical intervention needed for any finding and pt otherwise stable. Palliative care consulted to assist with establishing GOC.     Met Ms. Gloria this afternoon, no family at bedside. Pt notes pain earlier in day in pelvis and at times when she moves arm or is moved with nursing, sharp, non-radiating. She denies pain at present. She recalls getting IV steroids and oral oxycodone 2.5mg  (1 dose of latter this am), both with good effect, pain resolved after latter. She denies n/v, and last BM this am without issues. She is breathing well, some dyspnea on exertion at baseline, noting she has COPD and is O2 dependent at home as well on 4L. She denies dyspnea at rest. She denies any other issues.     Pt was able to recall and recount full oncologic history and interventions. She is aware of fractures in pelvis and right arm, as well as new masses in brain. She shared already speaking with her oncologist and radiation oncologist, aware of plan for brain RT and no chemo, but follow up with oncology for further treatment once she is dc'd. She is on board with this plan.     Pt shared that she has 2 nieces listed as emergency contacts, but that the people who she noted she lived with previously in hospitalization are not actually her family members, but her tenant and  who helps her with all of her tasks. She considers them family though, as her nieces also help her but live in [place] for half the year (they are there now). She has a son who  and daughter Ashia [phone], who lives in [place], who she states she would want to be her HCP, unsure who she would want to be 2nd of her true nieces (Lamar and Shante). Explained role of palliative care and offered family meeting to clarify plan and her wishes with whomever she felt would be supporting her moving forward, which she agreed with. She noted wanting her daughter to be there (via phone) and her  Karuna as well. Left card for  since she would be visiting later as per pt, and agreed to arrange time that works for her daughter and make  aware of this, hopefully for tomorrow.     Called daughter and scheduled meeting for 9am tomorrow (to allow for time difference).     PAIN: ( )Yes   (x )No    DYSPNEA: ( ) Yes  ( x) No- only on exertion  Level:    PAST MEDICAL & SURGICAL HISTORY:  Pancreatic cancer  Mitral regurgitation  Chronic systolic congestive heart failure  Malignant neoplasm of lung, unspecified laterality, unspecified part of lung: Stage 4  Chronic obstructive pulmonary disease, unspecified COPD type  Diabetes mellitus: Type 2  No significant past surgical history      SOCIAL HX: Lives in private home,  and tenant help take care of her, has son (), and daughter, with nieces that also help    Hx opiate tolerance (x )YES  ( )NO    Baseline ADLs  (Prior to Admission)- pt able to take care of IADLs, but seems to need help with some ADLs like cleaning and showering at times  (x ) Independent   ( )Dependent    FAMILY HISTORY:  No pertinent family history in first degree relatives      Review of Systems:    Anxiety- denies  Depression- denies  Constipation- denies  Diarrhea- denies  Nausea- denies  Vomiting- denies  Anorexia- denies  Weight Loss- denies  Fatigue- denies  Weakness- at times  Delirium- denies    All other systems reviewed and negative      PHYSICAL EXAM:    Vital Signs Last 24 Hrs  T(C): 36.4 (08 Mar 2018 12:00), Max: 36.4 (07 Mar 2018 20:44)  T(F): 97.6 (08 Mar 2018 12:00), Max: 97.6 (08 Mar 2018 12:00)  HR: 85 (08 Mar 2018 12:00) (73 - 86)  BP: 119/42 (08 Mar 2018 12:00) (100/57 - 119/42)  BP(mean): --  RR: 17 (08 Mar 2018 12:00) (17 - 18)  SpO2: 95% (08 Mar 2018 12:00) (94% - 97%)  Daily     Daily     PPSV2: 30  %    General: Elderly female, sitting up in bed, pleasant, NAD  Mental Status: AOx4  HEENT: + bruising over right eye, perrl, eomi  Lungs: clear  Cardiac: +s1 s2 rrr  GI: soft nt nd +bs  : voids  Ext: R arm in sling, no edema, rom without pain in LE  Neuro: no focal deficits      LABS:                        8.3    11.11 )-----------( 199      ( 08 Mar 2018 05:51 )             26.8     03-08    x   |  x   |  29<H>  ----------------------------<  x   x    |  x   |  0.62    Ca    8.3<L>      07 Mar 2018 04:49  Mg     1.9         TPro  6.9  /  Alb  2.9<L>  /  TBili  0.7  /  DBili  x   /  AST  26  /  ALT  31  /  AlkPhos  125<H>        Albumin: Albumin, Serum: 2.9 g/dL ( @ 04:49)      Allergies    No Known Allergies    Intolerances      MEDICATIONS  (STANDING):  aspirin enteric coated 81 milliGRAM(s) Oral daily  atorvastatin 10 milliGRAM(s) Oral at bedtime  dexamethasone  Injectable 4 milliGRAM(s) IV Push four times a day  enalapril 1.25 milliGRAM(s) Oral daily  metoprolol succinate ER 50 milliGRAM(s) Oral daily  spironolactone 25 milliGRAM(s) Oral daily    MEDICATIONS  (PRN):  acetaminophen   Tablet 650 milliGRAM(s) Oral every 6 hours PRN For Temp greater than 38 C (100.4 F)  acetaminophen   Tablet. 650 milliGRAM(s) Oral every 6 hours PRN Mild Pain (1 - 3)  docusate sodium 100 milliGRAM(s) Oral three times a day PRN Constipation  ondansetron Injectable 4 milliGRAM(s) IV Push every 6 hours PRN Nausea  oxyCODONE    IR 5 milliGRAM(s) Oral every 6 hours PRN Moderate Pain (4 - 6)  oxyCODONE    IR 2.5 milliGRAM(s) Oral every 6 hours PRN Mild Pain (1 - 3)  senna 2 Tablet(s) Oral at bedtime PRN Constipation      RADIOLOGY/ADDITIONAL STUDIES:

## 2018-03-09 RX ADMIN — Medication 4 MILLIGRAM(S): at 12:14

## 2018-03-09 RX ADMIN — Medication 81 MILLIGRAM(S): at 12:14

## 2018-03-09 RX ADMIN — ATORVASTATIN CALCIUM 10 MILLIGRAM(S): 80 TABLET, FILM COATED ORAL at 21:15

## 2018-03-09 RX ADMIN — Medication 4 MILLIGRAM(S): at 05:21

## 2018-03-09 RX ADMIN — Medication 50 MILLIGRAM(S): at 05:21

## 2018-03-09 RX ADMIN — SPIRONOLACTONE 25 MILLIGRAM(S): 25 TABLET, FILM COATED ORAL at 05:21

## 2018-03-09 RX ADMIN — Medication 4 MILLIGRAM(S): at 17:55

## 2018-03-09 NOTE — PHYSICAL THERAPY INITIAL EVALUATION ADULT - PERTINENT HX OF CURRENT PROBLEM, REHAB EVAL
mechanical fall, R Sup/Inf Pubic Ramus Fracture, R sacral ala fx, R humeral head fx. MRIs done to r/o mets: no osteolytic/blastic lesions noted. MRI brain + IC mets 2 new lesions)

## 2018-03-09 NOTE — PHYSICAL THERAPY INITIAL EVALUATION ADULT - PRECAUTIONS/LIMITATIONS, REHAB EVAL
fall precautions/WBAT with assistive devices as needed per ortho re: pelvic fx WBAT with assistive devices as needed per ortho re: pelvic fxs, RUE in sling, allowed pendulum exer, ROM elbow/hand per ortho note/fall precautions/hearing precautions

## 2018-03-09 NOTE — PHYSICAL THERAPY INITIAL EVALUATION ADULT - CRITERIA FOR SKILLED THERAPEUTIC INTERVENTIONS
rehab potential/predicted duration of therapy intervention/risk reduction/prevention/impairments found/functional limitations in following categories/therapy frequency/anticipated equipment needs at discharge/anticipated discharge recommendation

## 2018-03-09 NOTE — CHART NOTE - NSCHARTNOTEFT_GEN_A_CORE
HPI:  84 y/o F PMHx significant for metastatic Lung Cancer (StageIV), severe COPD, NIDDM,   pancreatic Ca, CHF (HFrEF; LVEF 20-25%), and moderate mitral regurgitation who was   initially evaluated in the ED on 3/6 for a fall at home while walking to the bathroom with resultant right shoulder and pelvic pain and discharged and called back to the ED   as imaging performed officially revealed a fracture at the greater tuberosity in Right   Humeral head. (06 Mar 2018 22:09)      PERTINENT PMH REVIEWED:  [ X ] YES [ ] NO           Primary Contact: Madina                  Relationship:  daughter                       HCP/Surrogate:    HCP             Phone Number: [phone]    HCP [ X ] Surrogate [   ] Guardian [   ]    Mental Status: [ X ] Alert  [ X ] Oriented [  ] Confused [  ] Lethargic [  ]  Concerns of Depression [  ]- None identified  Anxiety [   ]- None identified  Baseline ADLs (prior to admission):  Independent [ X ] moderately [ ] fully   Dependent   [ ] moderately [ ]fully    Family Meeting attendees: Pt, pts daughter Madina via phone, [facility] [place], [place]    Anticipated Grief: Patient [ X ] Family [ X ]    Goals of Care: Comfort [  ] Rehabilitation [ X ] Curative [  ] Life Prolonging [  ]    ADVANCE DIRECTIVES:  [ X ] YES [ ] NO   DNR [ X ] YES [ ] NO  DNI [ X ] YES [ ] NO Completed on: 3/9/18                    MOLST  [ X ] YES [ ] NO   Completed on: 3/9/18    Anticipated D/C Plan: TRACY                   Summary:    Team met with pt. and her daughter (via phone) to discuss goals of care, assist with planning and provide supportive counseling. Role of palliative are, pts current course in the hospital and current condition reviewed.    Pt. discussed her plan to pursue Brain Radiation and then speak with Dr. Waller regarding possible further chemotherapy. Pt. is clear that she wants to continue with treatment. She recognizes she is weak and needs rehab and is willing to do so. Team discussed with pt and her daughter that sometimes needing radiation and Rehab can be a challenge as they have to find a rehab facility that would be willing to transport pt. to her treatments. Pt. expressed understanding of this and reports she will go to whichever facility would allow this, if they will. Social Work and CM are aware and will further inquire.     Dr. Osorio also came in during our meeting and confirmed pts plan of 3 radiation treatments. Pt. will need 4 visits total one for planning and then the 3 treatments.     Advanced directives discussed. Pt completed HCP naming her daughter Madina as primary and Niece Lamar as alternate. MOLST also reviewed and completed. MOLST states DNR/DNI, Limited Medical, Send to Hospital, No feeding tube, Trial of IV fluids, Determine use or limitation of antibiotics, No Dialysis, Ok with transfusions, No ICU and No Pressors. Hospital DNR/DNI form completed as well.    Plan at this time is for TRACY (as long as a facility will provide transport to radiation) and to follow up with Oncology. Emotional support provided. Our team will continue to follow.

## 2018-03-09 NOTE — PHYSICAL THERAPY INITIAL EVALUATION ADULT - ACTIVE RANGE OF MOTION EXAMINATION, REHAB EVAL
R hip flex ~70 deg supine, R shld NT, R hand WFL/Left UE Active ROM was WFL (within functional limits)/bilateral  lower extremity Active ROM was WFL (within functional limits)

## 2018-03-09 NOTE — GOALS OF CARE CONVERSATION - PERSONAL ADVANCE DIRECTIVE - TREATMENT GUIDELINE COMMENT
MOLST- DNR, limited medical interventions, DNI, send to hospital, no feeding tube, trial of IVF, determine use of antibiotics, ok for transfusions, no dialysis, no icu, no pressors. *Spent 60 minutes discussing GOC with family including Advance care planning, explanation and discussion of advance directives, reviewed MOLST and finalized DNR/DNI form.

## 2018-03-09 NOTE — GOALS OF CARE CONVERSATION - PERSONAL ADVANCE DIRECTIVE - CONVERSATION DETAILS
Met with pt and daughter via phone to review medical issues and overall GOC. Daughter corroborated impression that pt has been doing well through her treatments. She also shared impression from home companion that pt may be getting somewhat unstable on her feet, which they agree may be due to brain mets. Daughter committed to helping make sure pt's affairs are taking care of and flying in from [place] soon to oversee her care.     Spent time updating both pt and daughter on all studies and results found since here. Also discussed subspecialists impressions of fractures (no surgery needed), new mets, recent scans, and plan moving forward. They were aware of plan for RT and possibly more chemo after this. Dr. Osorio joined meeting to clarify radiation treatment plan and to discuss complicating factor of rehab, noting [facility] is only facility that will transport for RT. Pt agreeable to go there if accepted to help facilitate plan. Pt and daughter agree that given her great response to treatment in the past we should continue with treatment now.     They also understand that pt has serious conditions including COPD, CHF, and cancer that make her at higher risk of complication and thus it is important to have a plan for all scenarios. Thus we completed a HCP naming pt's daughter Ashia welch/ marin Newton as back up. We also completed a limited MOLST, confirming DNR and DNI choices made in the past (see below for all decisions on this form). While pt and daughter clear that they want to continue to pursue disease-modifying therapy (which we agreed with), they filled out the form and shared that what is most important to them is quality of life alongside this, understanding that should this suffer they will make choices more consistent with comfort focus. Copies of these provided to pt for loved ones to store.     Overall, pt confirmed that she is comfortable and plan is clear to her and her daughter. PT will reevaluate and make concrete recs for TRACY, pt/family will work with floor SW on possible rehab placement, and they will coordinate follow up with Dr. Osorio and Dr. Waller.

## 2018-03-10 LAB
APPEARANCE UR: (no result)
BACTERIA # UR AUTO: (no result)
BILIRUB UR-MCNC: NEGATIVE — SIGNIFICANT CHANGE UP
COLOR SPEC: YELLOW — SIGNIFICANT CHANGE UP
DIFF PNL FLD: (no result)
EPI CELLS # UR: SIGNIFICANT CHANGE UP
GLUCOSE UR QL: 250 MG/DL
KETONES UR-MCNC: NEGATIVE — SIGNIFICANT CHANGE UP
LEUKOCYTE ESTERASE UR-ACNC: (no result)
NITRITE UR-MCNC: POSITIVE
PH UR: 5 — SIGNIFICANT CHANGE UP (ref 5–8)
PROT UR-MCNC: NEGATIVE MG/DL — SIGNIFICANT CHANGE UP
RBC CASTS # UR COMP ASSIST: (no result) /HPF (ref 0–4)
SP GR SPEC: 1.02 — SIGNIFICANT CHANGE UP (ref 1.01–1.02)
UROBILINOGEN FLD QL: NEGATIVE MG/DL — SIGNIFICANT CHANGE UP
WBC UR QL: >50

## 2018-03-10 RX ORDER — PANTOPRAZOLE SODIUM 20 MG/1
40 TABLET, DELAYED RELEASE ORAL
Qty: 0 | Refills: 0 | Status: DISCONTINUED | OUTPATIENT
Start: 2018-03-10 | End: 2018-03-16

## 2018-03-10 RX ORDER — DEXAMETHASONE 0.5 MG/5ML
4 ELIXIR ORAL EVERY 6 HOURS
Qty: 0 | Refills: 0 | Status: DISCONTINUED | OUTPATIENT
Start: 2018-03-10 | End: 2018-03-16

## 2018-03-10 RX ADMIN — Medication 4 MILLIGRAM(S): at 00:19

## 2018-03-10 RX ADMIN — Medication 81 MILLIGRAM(S): at 12:12

## 2018-03-10 RX ADMIN — Medication 4 MILLIGRAM(S): at 12:12

## 2018-03-10 RX ADMIN — Medication 4 MILLIGRAM(S): at 17:22

## 2018-03-10 RX ADMIN — Medication 50 MILLIGRAM(S): at 05:41

## 2018-03-10 RX ADMIN — Medication 4 MILLIGRAM(S): at 05:41

## 2018-03-10 RX ADMIN — ATORVASTATIN CALCIUM 10 MILLIGRAM(S): 80 TABLET, FILM COATED ORAL at 21:35

## 2018-03-10 RX ADMIN — SPIRONOLACTONE 25 MILLIGRAM(S): 25 TABLET, FILM COATED ORAL at 05:41

## 2018-03-11 LAB
HCT VFR BLD CALC: 29.8 % — LOW (ref 34.5–45)
HGB BLD-MCNC: 9.4 G/DL — LOW (ref 11.5–15.5)
MCHC RBC-ENTMCNC: 26.9 PG — LOW (ref 27–34)
MCHC RBC-ENTMCNC: 31.5 GM/DL — LOW (ref 32–36)
MCV RBC AUTO: 85.1 FL — SIGNIFICANT CHANGE UP (ref 80–100)
NRBC # BLD: 0 /100 WBCS — SIGNIFICANT CHANGE UP (ref 0–0)
PLATELET # BLD AUTO: 276 K/UL — SIGNIFICANT CHANGE UP (ref 150–400)
RBC # BLD: 3.5 M/UL — LOW (ref 3.8–5.2)
RBC # FLD: 16.5 % — HIGH (ref 10.3–14.5)
WBC # BLD: 12.34 K/UL — HIGH (ref 3.8–10.5)
WBC # FLD AUTO: 12.34 K/UL — HIGH (ref 3.8–10.5)

## 2018-03-11 RX ORDER — CEFTRIAXONE 500 MG/1
INJECTION, POWDER, FOR SOLUTION INTRAMUSCULAR; INTRAVENOUS
Qty: 0 | Refills: 0 | Status: DISCONTINUED | OUTPATIENT
Start: 2018-03-11 | End: 2018-03-13

## 2018-03-11 RX ORDER — CEFTRIAXONE 500 MG/1
1000 INJECTION, POWDER, FOR SOLUTION INTRAMUSCULAR; INTRAVENOUS EVERY 24 HOURS
Qty: 0 | Refills: 0 | Status: DISCONTINUED | OUTPATIENT
Start: 2018-03-12 | End: 2018-03-13

## 2018-03-11 RX ORDER — HEPARIN SODIUM 5000 [USP'U]/ML
5000 INJECTION INTRAVENOUS; SUBCUTANEOUS EVERY 8 HOURS
Qty: 0 | Refills: 0 | Status: DISCONTINUED | OUTPATIENT
Start: 2018-03-11 | End: 2018-03-16

## 2018-03-11 RX ORDER — CEFTRIAXONE 500 MG/1
INJECTION, POWDER, FOR SOLUTION INTRAMUSCULAR; INTRAVENOUS
Qty: 0 | Refills: 0 | Status: DISCONTINUED | OUTPATIENT
Start: 2018-03-11 | End: 2018-03-11

## 2018-03-11 RX ORDER — CEFTRIAXONE 500 MG/1
1000 INJECTION, POWDER, FOR SOLUTION INTRAMUSCULAR; INTRAVENOUS ONCE
Qty: 0 | Refills: 0 | Status: COMPLETED | OUTPATIENT
Start: 2018-03-11 | End: 2018-03-11

## 2018-03-11 RX ADMIN — Medication 4 MILLIGRAM(S): at 05:51

## 2018-03-11 RX ADMIN — Medication 81 MILLIGRAM(S): at 12:30

## 2018-03-11 RX ADMIN — ATORVASTATIN CALCIUM 10 MILLIGRAM(S): 80 TABLET, FILM COATED ORAL at 21:21

## 2018-03-11 RX ADMIN — Medication 50 MILLIGRAM(S): at 05:51

## 2018-03-11 RX ADMIN — HEPARIN SODIUM 5000 UNIT(S): 5000 INJECTION INTRAVENOUS; SUBCUTANEOUS at 21:21

## 2018-03-11 RX ADMIN — Medication 4 MILLIGRAM(S): at 12:30

## 2018-03-11 RX ADMIN — Medication 4 MILLIGRAM(S): at 00:54

## 2018-03-11 RX ADMIN — PANTOPRAZOLE SODIUM 40 MILLIGRAM(S): 20 TABLET, DELAYED RELEASE ORAL at 05:51

## 2018-03-11 RX ADMIN — SPIRONOLACTONE 25 MILLIGRAM(S): 25 TABLET, FILM COATED ORAL at 05:51

## 2018-03-11 RX ADMIN — Medication 4 MILLIGRAM(S): at 17:55

## 2018-03-11 RX ADMIN — CEFTRIAXONE 1000 MILLIGRAM(S): 500 INJECTION, POWDER, FOR SOLUTION INTRAMUSCULAR; INTRAVENOUS at 12:29

## 2018-03-12 LAB
CULTURE RESULTS: NO GROWTH — SIGNIFICANT CHANGE UP
HCT VFR BLD CALC: 30.1 % — LOW (ref 34.5–45)
HGB BLD-MCNC: 9.6 G/DL — LOW (ref 11.5–15.5)
MCHC RBC-ENTMCNC: 27 PG — SIGNIFICANT CHANGE UP (ref 27–34)
MCHC RBC-ENTMCNC: 31.9 GM/DL — LOW (ref 32–36)
MCV RBC AUTO: 84.6 FL — SIGNIFICANT CHANGE UP (ref 80–100)
NRBC # BLD: 0 /100 WBCS — SIGNIFICANT CHANGE UP (ref 0–0)
PLATELET # BLD AUTO: 297 K/UL — SIGNIFICANT CHANGE UP (ref 150–400)
RBC # BLD: 3.56 M/UL — LOW (ref 3.8–5.2)
RBC # FLD: 16.6 % — HIGH (ref 10.3–14.5)
SPECIMEN SOURCE: SIGNIFICANT CHANGE UP
WBC # BLD: 14.2 K/UL — HIGH (ref 3.8–10.5)
WBC # FLD AUTO: 14.2 K/UL — HIGH (ref 3.8–10.5)

## 2018-03-12 RX ADMIN — Medication 4 MILLIGRAM(S): at 23:13

## 2018-03-12 RX ADMIN — Medication 4 MILLIGRAM(S): at 11:20

## 2018-03-12 RX ADMIN — ATORVASTATIN CALCIUM 10 MILLIGRAM(S): 80 TABLET, FILM COATED ORAL at 21:58

## 2018-03-12 RX ADMIN — HEPARIN SODIUM 5000 UNIT(S): 5000 INJECTION INTRAVENOUS; SUBCUTANEOUS at 06:15

## 2018-03-12 RX ADMIN — CEFTRIAXONE 1000 MILLIGRAM(S): 500 INJECTION, POWDER, FOR SOLUTION INTRAMUSCULAR; INTRAVENOUS at 09:39

## 2018-03-12 RX ADMIN — PANTOPRAZOLE SODIUM 40 MILLIGRAM(S): 20 TABLET, DELAYED RELEASE ORAL at 06:15

## 2018-03-12 RX ADMIN — Medication 4 MILLIGRAM(S): at 00:43

## 2018-03-12 RX ADMIN — Medication 4 MILLIGRAM(S): at 17:56

## 2018-03-12 RX ADMIN — Medication 4 MILLIGRAM(S): at 06:15

## 2018-03-12 RX ADMIN — SPIRONOLACTONE 25 MILLIGRAM(S): 25 TABLET, FILM COATED ORAL at 06:15

## 2018-03-12 RX ADMIN — Medication 81 MILLIGRAM(S): at 11:20

## 2018-03-12 RX ADMIN — HEPARIN SODIUM 5000 UNIT(S): 5000 INJECTION INTRAVENOUS; SUBCUTANEOUS at 21:59

## 2018-03-12 RX ADMIN — HEPARIN SODIUM 5000 UNIT(S): 5000 INJECTION INTRAVENOUS; SUBCUTANEOUS at 15:20

## 2018-03-12 RX ADMIN — Medication 50 MILLIGRAM(S): at 06:15

## 2018-03-13 LAB
HCT VFR BLD CALC: 31.3 % — LOW (ref 34.5–45)
HGB BLD-MCNC: 9.9 G/DL — LOW (ref 11.5–15.5)
MCHC RBC-ENTMCNC: 26.7 PG — LOW (ref 27–34)
MCHC RBC-ENTMCNC: 31.6 GM/DL — LOW (ref 32–36)
MCV RBC AUTO: 84.4 FL — SIGNIFICANT CHANGE UP (ref 80–100)
NRBC # BLD: 0 /100 WBCS — SIGNIFICANT CHANGE UP (ref 0–0)
PLATELET # BLD AUTO: 313 K/UL — SIGNIFICANT CHANGE UP (ref 150–400)
RBC # BLD: 3.71 M/UL — LOW (ref 3.8–5.2)
RBC # FLD: 17.1 % — HIGH (ref 10.3–14.5)
WBC # BLD: 15.22 K/UL — HIGH (ref 3.8–10.5)
WBC # FLD AUTO: 15.22 K/UL — HIGH (ref 3.8–10.5)

## 2018-03-13 RX ADMIN — Medication 4 MILLIGRAM(S): at 05:16

## 2018-03-13 RX ADMIN — HEPARIN SODIUM 5000 UNIT(S): 5000 INJECTION INTRAVENOUS; SUBCUTANEOUS at 22:15

## 2018-03-13 RX ADMIN — HEPARIN SODIUM 5000 UNIT(S): 5000 INJECTION INTRAVENOUS; SUBCUTANEOUS at 05:16

## 2018-03-13 RX ADMIN — HEPARIN SODIUM 5000 UNIT(S): 5000 INJECTION INTRAVENOUS; SUBCUTANEOUS at 15:28

## 2018-03-13 RX ADMIN — Medication 81 MILLIGRAM(S): at 12:04

## 2018-03-13 RX ADMIN — PANTOPRAZOLE SODIUM 40 MILLIGRAM(S): 20 TABLET, DELAYED RELEASE ORAL at 05:16

## 2018-03-13 RX ADMIN — Medication 4 MILLIGRAM(S): at 17:47

## 2018-03-13 RX ADMIN — ATORVASTATIN CALCIUM 10 MILLIGRAM(S): 80 TABLET, FILM COATED ORAL at 22:15

## 2018-03-13 RX ADMIN — Medication 50 MILLIGRAM(S): at 05:16

## 2018-03-13 RX ADMIN — SPIRONOLACTONE 25 MILLIGRAM(S): 25 TABLET, FILM COATED ORAL at 05:16

## 2018-03-13 RX ADMIN — Medication 4 MILLIGRAM(S): at 12:04

## 2018-03-13 RX ADMIN — CEFTRIAXONE 1000 MILLIGRAM(S): 500 INJECTION, POWDER, FOR SOLUTION INTRAMUSCULAR; INTRAVENOUS at 09:40

## 2018-03-14 PROCEDURE — 71045 X-RAY EXAM CHEST 1 VIEW: CPT | Mod: 26

## 2018-03-14 RX ADMIN — HEPARIN SODIUM 5000 UNIT(S): 5000 INJECTION INTRAVENOUS; SUBCUTANEOUS at 21:43

## 2018-03-14 RX ADMIN — SPIRONOLACTONE 25 MILLIGRAM(S): 25 TABLET, FILM COATED ORAL at 05:40

## 2018-03-14 RX ADMIN — Medication 4 MILLIGRAM(S): at 00:17

## 2018-03-14 RX ADMIN — Medication 50 MILLIGRAM(S): at 05:40

## 2018-03-14 RX ADMIN — PANTOPRAZOLE SODIUM 40 MILLIGRAM(S): 20 TABLET, DELAYED RELEASE ORAL at 05:40

## 2018-03-14 RX ADMIN — Medication 4 MILLIGRAM(S): at 23:06

## 2018-03-14 RX ADMIN — Medication 4 MILLIGRAM(S): at 13:52

## 2018-03-14 RX ADMIN — Medication 4 MILLIGRAM(S): at 18:51

## 2018-03-14 RX ADMIN — ATORVASTATIN CALCIUM 10 MILLIGRAM(S): 80 TABLET, FILM COATED ORAL at 21:43

## 2018-03-14 RX ADMIN — HEPARIN SODIUM 5000 UNIT(S): 5000 INJECTION INTRAVENOUS; SUBCUTANEOUS at 13:52

## 2018-03-14 RX ADMIN — Medication 81 MILLIGRAM(S): at 13:52

## 2018-03-14 RX ADMIN — Medication 4 MILLIGRAM(S): at 05:40

## 2018-03-14 RX ADMIN — HEPARIN SODIUM 5000 UNIT(S): 5000 INJECTION INTRAVENOUS; SUBCUTANEOUS at 05:43

## 2018-03-15 ENCOUNTER — TRANSCRIPTION ENCOUNTER (OUTPATIENT)
Age: 83
End: 2018-03-15

## 2018-03-15 LAB
HCT VFR BLD CALC: 32 % — LOW (ref 34.5–45)
HGB BLD-MCNC: 10.2 G/DL — LOW (ref 11.5–15.5)
MCHC RBC-ENTMCNC: 26.9 PG — LOW (ref 27–34)
MCHC RBC-ENTMCNC: 31.9 GM/DL — LOW (ref 32–36)
MCV RBC AUTO: 84.4 FL — SIGNIFICANT CHANGE UP (ref 80–100)
NRBC # BLD: 0 /100 WBCS — SIGNIFICANT CHANGE UP (ref 0–0)
PLATELET # BLD AUTO: 287 K/UL — SIGNIFICANT CHANGE UP (ref 150–400)
RBC # BLD: 3.79 M/UL — LOW (ref 3.8–5.2)
RBC # FLD: 17.1 % — HIGH (ref 10.3–14.5)
WBC # BLD: 14.2 K/UL — HIGH (ref 3.8–10.5)
WBC # FLD AUTO: 14.2 K/UL — HIGH (ref 3.8–10.5)

## 2018-03-15 RX ORDER — OXYCODONE AND ACETAMINOPHEN 5; 325 MG/1; MG/1
1 TABLET ORAL EVERY 6 HOURS
Qty: 0 | Refills: 0 | Status: DISCONTINUED | OUTPATIENT
Start: 2018-03-15 | End: 2018-03-16

## 2018-03-15 RX ORDER — DEXAMETHASONE 0.5 MG/5ML
1 ELIXIR ORAL
Qty: 60 | Refills: 0 | OUTPATIENT
Start: 2018-03-15 | End: 2018-03-29

## 2018-03-15 RX ORDER — PANTOPRAZOLE SODIUM 20 MG/1
1 TABLET, DELAYED RELEASE ORAL
Qty: 30 | Refills: 0 | OUTPATIENT
Start: 2018-03-15

## 2018-03-15 RX ORDER — METOPROLOL TARTRATE 50 MG
1 TABLET ORAL
Qty: 30 | Refills: 0 | OUTPATIENT
Start: 2018-03-15

## 2018-03-15 RX ADMIN — HEPARIN SODIUM 5000 UNIT(S): 5000 INJECTION INTRAVENOUS; SUBCUTANEOUS at 14:46

## 2018-03-15 RX ADMIN — HEPARIN SODIUM 5000 UNIT(S): 5000 INJECTION INTRAVENOUS; SUBCUTANEOUS at 05:05

## 2018-03-15 RX ADMIN — PANTOPRAZOLE SODIUM 40 MILLIGRAM(S): 20 TABLET, DELAYED RELEASE ORAL at 05:06

## 2018-03-15 RX ADMIN — HEPARIN SODIUM 5000 UNIT(S): 5000 INJECTION INTRAVENOUS; SUBCUTANEOUS at 21:49

## 2018-03-15 RX ADMIN — SPIRONOLACTONE 25 MILLIGRAM(S): 25 TABLET, FILM COATED ORAL at 05:06

## 2018-03-15 RX ADMIN — Medication 4 MILLIGRAM(S): at 05:05

## 2018-03-15 RX ADMIN — ATORVASTATIN CALCIUM 10 MILLIGRAM(S): 80 TABLET, FILM COATED ORAL at 21:49

## 2018-03-15 RX ADMIN — Medication 81 MILLIGRAM(S): at 13:07

## 2018-03-15 RX ADMIN — Medication 50 MILLIGRAM(S): at 05:06

## 2018-03-15 RX ADMIN — Medication 4 MILLIGRAM(S): at 17:50

## 2018-03-15 RX ADMIN — Medication 4 MILLIGRAM(S): at 13:07

## 2018-03-15 NOTE — DISCHARGE NOTE ADULT - MEDICATION SUMMARY - MEDICATIONS TO CHANGE
I will SWITCH the dose or number of times a day I take the medications listed below when I get home from the hospital:    metoprolol succinate 25 mg oral tablet, extended release  -- 2 tab(s) by mouth once a day

## 2018-03-15 NOTE — DISCHARGE NOTE ADULT - PLAN OF CARE
For RTX treatment meds as indicated supprotive care. F/u with Dr Ramírez in 2 weeks supportive care. F/u with Dr giron in 2 weeks

## 2018-03-15 NOTE — DISCHARGE NOTE ADULT - CARE PROVIDER_API CALL
Daniel Lugo), Medicine  [address] [place] [address]  Phone: [phone]  Fax: [phone]    Patricia Osorio), Radiation Oncology  [address] [place] [address]  Phone: [phone]  Fax: [phone]    Bernabe Ramírez), Orthopaedic Surgery  [address]  [place] [address]  Phone: [phone]  Fax: [phone]

## 2018-03-15 NOTE — DISCHARGE NOTE ADULT - PATIENT PORTAL LINK FT
You can access the [facility] Patient Portal, offered by [facility], by registering with the following website: [email or web address]

## 2018-03-15 NOTE — DIETITIAN INITIAL EVALUATION ADULT. - ENERGY NEEDS
Ht.   65   "        Wt.   63.5     kg               BMI      23.3            IBW     57  kg               Pt is at 111   %  IBW  Suggest new weight

## 2018-03-15 NOTE — DISCHARGE NOTE ADULT - SECONDARY DIAGNOSIS.
Malignant neoplasm of lung, unspecified laterality, unspecified part of lung Humerus fracture Pelvic fracture

## 2018-03-15 NOTE — DISCHARGE NOTE ADULT - MEDICATION SUMMARY - MEDICATIONS TO TAKE
I will START or STAY ON the medications listed below when I get home from the hospital:    dexamethasone 4 mg oral tablet  -- 1 tab(s) by mouth every 6 hours  -- Indication: For edema    spironolactone 25 mg oral tablet  -- 1 tab(s) by mouth once a day  -- Indication: For Cardiomyopathy    aspirin 81 mg oral delayed release tablet  -- 1 tab(s) by mouth once a day  -- Indication: For Cad    enalapril 2.5 mg oral tablet  -- 0.5 tab(s) by mouth once a day  -- Indication: For Cardiomyopathy    atorvastatin 10 mg oral tablet  -- 1 tab(s) by mouth once a day (at bedtime)  -- Indication: For Hyperlipidemia    metoprolol succinate 50 mg oral tablet, extended release  -- 1 tab(s) by mouth once a day  -- Indication: For Cardiomyopathy    pantoprazole 40 mg oral delayed release tablet  -- 1 tab(s) by mouth once a day (before a meal)  -- Indication: For for your stomach while on steroids I will START or STAY ON the medications listed below when I get home from the hospital:    dexamethasone 4 mg oral tablet  -- 1 tab(s) by mouth every 6 hours  -- Indication: For edema    spironolactone 25 mg oral tablet  -- 1 tab(s) by mouth once a day  -- Indication: For Cardiomyopathy    aspirin 81 mg oral delayed release tablet  -- 1 tab(s) by mouth once a day  -- Indication: For Cad    oxyCODONE-acetaminophen 5 mg-325 mg oral tablet  -- 1 tab(s) by mouth every 6 hours, As needed, Moderate Pain (4 - 6) MDD:4  -- Indication: For Pain    enalapril 2.5 mg oral tablet  -- 0.5 tab(s) by mouth once a day  -- Indication: For Cardiomyopathy    atorvastatin 10 mg oral tablet  -- 1 tab(s) by mouth once a day (at bedtime)  -- Indication: For Hyperlipidemia    metoprolol succinate 50 mg oral tablet, extended release  -- 1 tab(s) by mouth once a day  -- Indication: For Cardiomyopathy    pantoprazole 40 mg oral delayed release tablet  -- 1 tab(s) by mouth once a day (before a meal)  -- Indication: For for your stomach while on steroids

## 2018-03-15 NOTE — DIETITIAN INITIAL EVALUATION ADULT. - OTHER INFO
Patient is a 83y old  Female who presents with a chief complaint of Left Shoulder and Pelvic Pain.DX of DM, COPD, pancreatic Ca, brain mets. Goals of Care :  MOLST- DNR, limited medical interventions, DNI, send to hospital, no feeding tube, trial of IVF, determine use of antibiotics, ok for transfusions, no dialysis, no icu, no pressors. Edema not noted.  Abe 14.  NO PU.  Pt on DASH diet, and eating well.  Pt meets criteria for severe protein-calorie malnutrition in context of chronic disease. Pt reports wt loss of 10 lbs since last year, but current wt may be incorrect.  Pt reports PO intake < 75% nutritional needs > one month, nutrition focused physical exam reveals moderate muscle wasting (clavicles, scapulas, shoulders, temporalis, thighs, calves, interosseus), moderate fat depletion (ribs, triceps.).  Suggest maintain current diet, liberalize to regular if pt PO intake decreases, add Ensure Enlive 8 oz tid.  Will monitor.

## 2018-03-15 NOTE — CHART NOTE - NSCHARTNOTEFT_GEN_A_CORE
Upon Nutritional Assessment by the Registered Dietitian your patient was determined to meet criteria / has evidence of the following diagnosis/diagnoses:          [ ]  Mild Protein Calorie Malnutrition        [ ]  Moderate Protein Calorie Malnutrition        [ x] Severe Protein Calorie Malnutrition        [ ] Unspecified Protein Calorie Malnutrition        [ ] Underweight / BMI <19        [ ] Morbid Obesity / BMI > 40      Pt meets criteria for severe protein-calorie malnutrition in context of chronic disease.   Pt reports wt loss of 10 lbs since last year, but current wt may be incorrect.    Pt reports PO intake < 75% nutritional needs > one month,   nutrition focused physical exam reveals moderate muscle wasting (clavicles, scapulas, shoulders, temporalis, thighs, calves, interosseus), moderate fat depletion (ribs, triceps.).         Findings as based on:  •  Comprehensive nutrition assessment and consultation  •  Calorie counts (nutrient intake analysis)  •  Food acceptance and intake status from observations by staff  •  Follow up  •  Patient education  •  Intervention secondary to interdisciplinary rounds  •   concerns      Treatment:    The following diet has been recommended:      DASH diet, liberalize to regular if PO intake < 50% needs  Add Ensure Enlive 8 oz tid  PO intake encouraged  Record  PO intake in EMR post meals              PROVIDER Section:     By signing this assessment you are acknowledging and agree with the diagnosis/diagnoses assigned by the Registered Dietitian    Comments:

## 2018-03-15 NOTE — DISCHARGE NOTE ADULT - CARE PLAN
Principal Discharge DX:	Brain metastases  Goal:	For RTX treatment  Assessment and plan of treatment:	meds as indicated  Secondary Diagnosis:	Malignant neoplasm of lung, unspecified laterality, unspecified part of lung  Secondary Diagnosis:	Humerus fracture  Goal:	supprotive care. F/u with Dr Giron in 2 weeks  Secondary Diagnosis:	Pelvic fracture  Goal:	supportive care. F/u with Dr giron in 2 weeks

## 2018-03-15 NOTE — DIETITIAN INITIAL EVALUATION ADULT. - NS AS NUTRI DX NUTRIENT
Pt meets criteria for severe protein-calorie malnutrition in context of chronic disease/Malnutrition

## 2018-03-15 NOTE — DISCHARGE NOTE ADULT - HOSPITAL COURSE
· Subjective and Objective: 	  CC:  Patient is a 83y old  Female who presents with a chief complaint of Left Shoulder and Pelvic Pain (06 Mar 2018 22:09)  Lives at home in a private residence in the community w/ her family; pt  tripped and fell while ambulating with a cane.  slipped on a wet floor.  Incidentaloma: brain mets on a pt with h/o lung ca disease free recently.          3/13: status quo. DTR Ashia given updates regarding her status. Realistically, pt needs several weeks of TRACY which would postpone her RTX. She is very de-conditioned and weak. DTR deciding on taking her home with private hire so that she can complete her RTX and will then consider placing her when complete  3/14: uneventful. was able to walk 3-4 feet with PT    ICU Vital Signs Last 24 Hrs  T(C): 36.6 (15 Mar 2018 04:57), Max: 36.6 (15 Mar 2018 04:57)  T(F): 97.9 (15 Mar 2018 04:57), Max: 97.9 (15 Mar 2018 04:57)  HR: 70 (15 Mar 2018 04:57) (70 - 87)  BP: 120/56 (15 Mar 2018 04:57) (106/49 - 138/72)  BP(mean): --  ABP: --  ABP(mean): --  RR: 18 (15 Mar 2018 04:57) (18 - 18)  SpO2: 94% (15 Mar 2018 04:57) (94% - 95%)          PHYSICAL EXAM:  R facial hematoma  no acute distress.  lungs clear.  right chest wall MediPort.  heart regular.  abd soft.  NT.  ND.  ext (+) right arm sling.  NV intact.  neuro A&Ox3.  Meds/labs: reviewed       MR Femur w/wo IV Cont, Right   IMPRESSION:   1.  Right superior and inferior pubic rami fractures.  2.  Right sacral alar fracture.  3.  Moderate strains of the bilateral adductor musculature.  4.  Colonic diverticulosis.  5.  Degenerative changes as above.    < end of copied text >Assessment and Plan:   · Assessment		  83F admitted 03/07/18.  presents to ED after a mechanical fall.  fx of right humeral head and pelvis.     * accidental fall with  fx of right humeral head and pelvis.   - PT and rehab    * Stage 4 lung CA.   - vasogenic edema with mild mass effect- steroids (NOT to TAPER - will be tapered by RAD-onc during her RTX)  -new brain mets identified on MRI.  -decadron 4mg po q6H/PPI.   - Pain control  -radiation oncology f/u outpatient, re:  [facility] radiosurgery  go home with private hire and finish up RTX then DTR is considering placement thereafter. Was not accepted to undergo RTX while in rehab. Realistically, patient appears very deconditioned and may not be able to participate in PT.   -Realistically, if to pursue RTX, she should go home to get RTX. DTR arriving from [facility] today and arranging for private hire.     *  Anemia/Leukocytosis  -favor acute blood loss due to pelvic fx a/w contusion/hematoma of the right internal obturator muscle.  -trend HH    *UTI  s/p ceftriaxone  Monitor WBC count. May also be elevated d/t decadron.   check CXR to r/o pna in immunocompromised host: nl · Subjective and Objective: 	  CC:  Patient is a 83y old  Female who presents with a chief complaint of Left Shoulder and Pelvic Pain (06 Mar 2018 22:09)  Lives at home in a private residence in the community w/ her family; pt  tripped and fell while ambulating with a cane.  slipped on a wet floor.  Incidentaloma: brain mets on a pt with h/o lung ca disease free recently.          3/13: status quo. DTR Ashia given updates regarding her status. Realistically, pt needs several weeks of TRACY which would postpone her RTX. She is very de-conditioned and weak. DTR deciding on taking her home with private hire so that she can complete her RTX and will then consider placing her when complete  3/14: uneventful. was able to walk 3-4 feet with PT    ICU Vital Signs Last 24 Hrs  T(C): 36.6 (15 Mar 2018 04:57), Max: 36.6 (15 Mar 2018 04:57)  T(F): 97.9 (15 Mar 2018 04:57), Max: 97.9 (15 Mar 2018 04:57)  HR: 70 (15 Mar 2018 04:57) (70 - 87)  BP: 120/56 (15 Mar 2018 04:57) (106/49 - 138/72)  BP(mean): --  ABP: --  ABP(mean): --  RR: 18 (15 Mar 2018 04:57) (18 - 18)  SpO2: 94% (15 Mar 2018 04:57) (94% - 95%)          PHYSICAL EXAM:  R facial hematoma  no acute distress.  lungs clear.  right chest wall MediPort.  heart regular.  abd soft.  NT.  ND.  ext (+) right arm sling.  NV intact.  neuro A&Ox3.  Meds/labs: reviewed       MR Femur w/wo IV Cont, Right   IMPRESSION:   1.  Right superior and inferior pubic rami fractures.  2.  Right sacral alar fracture.  3.  Moderate strains of the bilateral adductor musculature.  4.  Colonic diverticulosis.  5.  Degenerative changes as above.    < end of copied text >Assessment and Plan:   · Assessment		  83F admitted 03/07/18.  presents to ED after a mechanical fall.  fx of right humeral head and pelvis.     * accidental fall with  fx of right humeral head and pelvis.   - PT and rehab    * Stage 4 lung CA.   - vasogenic edema with mild mass effect- steroids (NOT to TAPER - will be tapered by RAD-onc during her RTX)  -new brain mets identified on MRI.  -decadron 4mg po q6H/PPI.   - Pain control  -radiation oncology f/u outpatient, re:  Cyberknife radiosurgery  go home with private hire and finish up RTX then DTR is considering placement thereafter. Was not accepted to undergo RTX while in rehab. Realistically, patient appears very deconditioned and may not be able to participate in PT.   -Realistically, if to pursue RTX, she should go home to get RTX. DTR arriving from [facility] today and arranging for private hire.     *  Anemia/Leukocytosis/severe protein calorie malnutrition  -favor acute blood loss due to pelvic fx a/w contusion/hematoma of the right internal obturator muscle.  -trend HH  -supportive care    *UTI  s/p ceftriaxone  Monitor WBC count. May also be elevated d/t decadron.   check CXR to r/o pna in immunocompromised host: nl

## 2018-03-16 VITALS
SYSTOLIC BLOOD PRESSURE: 127 MMHG | OXYGEN SATURATION: 96 % | HEART RATE: 79 BPM | TEMPERATURE: 97 F | RESPIRATION RATE: 18 BRPM | DIASTOLIC BLOOD PRESSURE: 69 MMHG

## 2018-03-16 RX ADMIN — Medication 4 MILLIGRAM(S): at 13:16

## 2018-03-16 RX ADMIN — Medication 4 MILLIGRAM(S): at 17:29

## 2018-03-16 RX ADMIN — Medication 50 MILLIGRAM(S): at 06:07

## 2018-03-16 RX ADMIN — Medication 4 MILLIGRAM(S): at 00:29

## 2018-03-16 RX ADMIN — Medication 4 MILLIGRAM(S): at 06:07

## 2018-03-16 RX ADMIN — SPIRONOLACTONE 25 MILLIGRAM(S): 25 TABLET, FILM COATED ORAL at 06:07

## 2018-03-16 RX ADMIN — HEPARIN SODIUM 5000 UNIT(S): 5000 INJECTION INTRAVENOUS; SUBCUTANEOUS at 06:07

## 2018-03-16 RX ADMIN — Medication 81 MILLIGRAM(S): at 13:16

## 2018-03-16 RX ADMIN — PANTOPRAZOLE SODIUM 40 MILLIGRAM(S): 20 TABLET, DELAYED RELEASE ORAL at 06:07

## 2018-03-16 NOTE — PROGRESS NOTE ADULT - ASSESSMENT
83F admitted 03/07/18.  presents to ED after a mechanical fall.  fx of right humeral head and pelvis.     * accidental fall with  fx of right humeral head and pelvis.   - PT and rehab    * Stage 4 lung CA.   - vasogenic edema with mild mass effect- steroids (NOT to TAPER - will be tapered by RAD-onc during her RTX)  -new brain mets identified on MRI.  -decadron 4mg po q6H/PPI.   - Pain control  -radiation oncology f/u outpatient, re:  Cyberknife radiosurgery AFTER REHAB; CAN GO TO REHAB ON PO DECADRON AND GET RADIATION AFTER or TRACY that will allow for RTX while in rehab-  case d/w Dr. Waller    *  Anemia.  -favor acute blood loss due to pelvic fx a/w contusion/hematoma of the right internal obturator muscle.  -trend HH    *DVT px- check H/H- if stable start heparin (contusion/hematoma of R internal obtuarator muscle) SCDs for now
83F admitted 03/07/18.  presents to ED after a mechanical fall.  fx of right humeral head and pelvis.     * accidental fall with  fx of right humeral head and pelvis.   - PT and rehab    * Stage 4 lung CA.   - vasogenic edema.  -new brain mets identified on MRI.  -c/w IV dexamethasone.  -radiation oncology f/u outpatient, re:  Cyberknife radiosurgery.    *  Anemia.  -favor acute blood loss due to pelvic fx a/w contusion/hematoma of the right internal obturator muscle.  -trend HH.      Pall meeting in am
83F.  admitted 03/07/18.  presents to ED after a mechanical fall.  fx of right humeral head and pelvis.  HCT     PMHx:  HFrEF LVEF 20-25%;  DM;  COPD;  stage 4 lung CA;  pancreatic CA.      normocytic anemia.  -favor acute blood loss due to pelvic fx a/w contusion/hematoma of the right internal obturator muscle.  -trend HH.  -Fe studies and stool OB.    stage 4 lung CA.  vasogenic edema.  -new brain mets identified on MRI.  -c/w IV dexamethasone.  -no role for chemotherapy.  -radiation oncology f/u outpatient, re:  Cyberknife radiosurgery.  -Oncology following.  -NSx following.    acute right humeral and pelvic fx secondary to mechanical fall.  -supportive management.  -no Orthopedic intervention suggested.  -right arm sling.  -PT/OOBTC/WBAT with assistive devices as needed.  -Orthopedic Sx following.    DVT prophylaxis.  -SCDs.    disposition.  -Oncology egan (1N).  -anticipate d/c planning to TRACY once medically stable.    communication.  -d/w RN.
83F admitted 03/07/18.  presents to ED after a mechanical fall.  fx of right humeral head and pelvis.     * accidental fall with  fx of right humeral head and pelvis.   - PT and rehab    * Stage 4 lung CA.   - vasogenic edema with mild mass effect- steroids (NOT to TAPER - will be tapered by RAD-onc during her RTX)  -new brain mets identified on MRI.  -decadron 4mg po q6H/PPI.   - Pain control  -radiation oncology f/u outpatient, re:  Cyberknife radiosurgery AFTER REHAB; CAN GO TO REHAB ON PO DECADRON AND GET RADIATION AFTER case d/w Dr. Osorio- eventual immunotherapry    *  Anemia.  -favor acute blood loss due to pelvic fx a/w contusion/hematoma of the right internal obturator muscle.  -trend HH    *DVT px- check H/H- if stable start heparin (contusion/hematoma of R internal obtuarator muscle) SCDs for now
83F admitted 03/07/18.  presents to ED after a mechanical fall.  fx of right humeral head and pelvis.     * accidental fall with  fx of right humeral head and pelvis.   - PT and rehab    * Stage 4 lung CA.   - vasogenic edema with mild mass effect- steroids (NOT to TAPER - will be tapered by RAD-onc during her RTX)  -new brain mets identified on MRI.  -decadron 4mg po q6H/PPI.   - Pain control  -radiation oncology f/u outpatient, re:  Cyberknife radiosurgery AFTER REHAB; CAN GO TO REHAB ON PO DECADRON AND GET RADIATION AFTER or TRACY that will allow for RTX while in rehab. Realistically, patient appears very deconditioned and may not be able to participate in PT. Palliative consult.     *  Anemia/Leukocytosis  -favor acute blood loss due to pelvic fx a/w contusion/hematoma of the right internal obturator muscle.  -trend HH    *UTI  F/u urine culture and continue ceftriaxone  Monitor WBC count. May also be elevated d/t decadron.     *DVT px- check H/H- if stable start heparin (contusion/hematoma of R internal obtuarator muscle) SCDs for now
83F admitted 03/07/18.  presents to ED after a mechanical fall.  fx of right humeral head and pelvis.     * accidental fall with  fx of right humeral head and pelvis.   - PT and rehab    * Stage 4 lung CA.   - vasogenic edema with mild mass effect- steroids (NOT to TAPER - will be tapered by RAD-onc during her RTX)  -new brain mets identified on MRI.  -decadron 4mg po q6H/PPI.   - Pain control  -radiation oncology f/u outpatient, re:  Cyberknife radiosurgery AFTER REHAB; CAN GO TO REHAB ON PO DECADRON AND GET RADIATION AFTER or go home with private hire and finish up RTX then DTR is considering placement thereafter. Was not accepted to undergo RTX while in rehab. Realistically, patient appears very deconditioned and may not be able to participate in PT.    *  Anemia/Leukocytosis  -favor acute blood loss due to pelvic fx a/w contusion/hematoma of the right internal obturator muscle.  -trend HH    *UTI  s/p ceftriaxone  Monitor WBC count. May also be elevated d/t decadron.     *DVT px- check H/H- if stable start heparin (contusion/hematoma of R internal obtuarator muscle) SCDs for now    Dispo: DTR arriving from [facility] on thrusday and working on obtaining private hire
83F admitted 03/07/18.  presents to ED after a mechanical fall.  fx of right humeral head and pelvis.     * accidental fall with  fx of right humeral head and pelvis.   - PT and rehab    * Stage 4 lung CA.   - vasogenic edema with mild mass effect- steroids (NOT to TAPER - will be tapered by RAD-onc during her RTX)  -new brain mets identified on MRI.  -decadron 4mg po q6H/PPI.   - Pain control  -radiation oncology f/u outpatient, re:  Cyberknife radiosurgery AFTER REHAB; CAN GO TO REHAB ON PO DECADRON AND GET RADIATION AFTER or go home with private hire and finish up RTX then DTR is considering placement thereafter. Was not accepted to undergo RTX while in rehab. Realistically, patient appears very deconditioned and may not be able to participate in PT.   -Realistically, if to pursue RTX, she should go home to get RTX. DTR arriving from [facility] today and arranging for private hire.     *  Anemia/Leukocytosis  -favor acute blood loss due to pelvic fx a/w contusion/hematoma of the right internal obturator muscle.  -trend HH    *UTI  s/p ceftriaxone  Monitor WBC count. May also be elevated d/t decadron.   check CXR to r/o pna in immunocompromised host    *DVT px- check H/H- if stable start heparin (contusion/hematoma of R internal obtuarator muscle) SCDs for now    Dispo: no CI for dc today
83F admitted 03/07/18.  presents to ED after a mechanical fall.  fx of right humeral head and pelvis.     * accidental fall with  fx of right humeral head and pelvis.   - PT and rehab    * Stage 4 lung CA.   - vasogenic edema with mild mass effect- steroids (NOT to TAPER - will be tapered by RAD-onc during her RTX)  -new brain mets identified on MRI.  -decadron 4mg po q6H/PPI.   - Pain control  -radiation oncology f/u outpatient, re:  Cyberknife radiosurgery AFTER REHAB; CAN GO TO REHAB ON PO DECADRON AND GET RADIATION AFTER or go home with private hire and finish up RTX then DTR is considering placement thereafter. Was not accepted to undergo RTX while in rehab. Realistically, patient appears very deconditioned and may not be able to participate in PT.   -Realistically, if to pursue RTX, she should go home to get RTX. DTR arriving from [facility] today and arranging for private hire.     *  Anemia/Leukocytosis  -favor acute blood loss due to pelvic fx a/w contusion/hematoma of the right internal obturator muscle.  -trend HH    *UTI  s/p ceftriaxone  Monitor WBC count. May also be elevated d/t decadron.   check CXR to r/o pna in immunocompromised host    *DVT px- check H/H- if stable start heparin (contusion/hematoma of R internal obtuarator muscle) SCDs for now    Dispo: DTR arriving from [facility] on thrusday and working on obtaining private hire
83F admitted 03/07/18.  presents to ED after a mechanical fall.  fx of right humeral head and pelvis.     * accidental fall with  fx of right humeral head and pelvis.   - PT and rehab    * Stage 4 lung CA.   - vasogenic edema.  -new brain mets identified on MRI.  -c/w IV dexamethasone.  -radiation oncology f/u outpatient, re:  Cyberknife radiosurgery AFTER REHAB; CAN GO TO REHAB ON PO DECADRON AND GET RADIATION AFTER case d/w Dr. Osorio    *  Anemia.  -favor acute blood loss due to pelvic fx a/w contusion/hematoma of the right internal obturator muscle.  -trend HH.
83y old Female coming from home with hx of COPD on home O2 (4L), CHF (EF 20-25%), DM2 and metastatic Lung Cancer (Stage IV) dx 9/16 tx with carboplatin w/ excellent response and prophylactic whole brain xrt. This was followed by progression of dz in chest in Aug 2017 tx w/ [id number] with excellent response as well and recent PET in Feb 2018 w/ no progression. Pt now admitted 3/6 for mechanical fall at home w/ pelvic and shoulder pain, followed by ED visit, and called back for admission due to +imaging. Found to have anemia, CXR showing nondisplaced R humoral fracture, CT pelvis showing nondisplaced fracture of right inferior and superior rami w/ associated hematoma of R internal obturator muscle, and lastly CTH showing L centrum seminovale/radiata mass and R frontal lesion with some vasogenic edema. No surgical intervention needed for any finding and pt otherwise stable. Palliative care consulted to assist with establishing GOC.    1) Pain  - 2/2 to acute fractures  - controlled with oxycodone 2.5 mg q4h prn and backup of 5mg q4h prn, c/w this  - also c/w steroids as they are likely helping both intracranial process and bone pain  - As per Dr. Osorio important to dc on same steroid dose and allow Dr. Osorio to taper along with treatment.  - will continue to monitor    2) Fractures  - ortho notes appreciated  - right arm in sling  - no surgical intervention otherwise recommended  - PT consult when able    3) Anemia  - likely 2/2 to hematoma s/p fall  - hgb appears stable thus far  -c/w monitoring    4) Stage IV lung ca with mets to brain  - neurosurgery consult appreciated, no surgical intervention  - onc and rad onc consults appreciated, plan for cyberknife and onc follow up for further intervention  - MRI confirming same results  - recent PET with no further progression  - seems to have done very well with tx regimens in past    5) Debility  - seems to have been doing ok at home functionally  - walks with cane at baseline  - mechanical fall on wet floor  - PT eval cc rehab    6) Prognosis  - guarded  - appears that pt still good candidate for disease-modifying therapy and tolerates this well. Has COPD, O2-dependent and CHF with poor EF at baseline, but still appears to be fairing ok despite this, though these comorbidities put her at increased risk for complications     7) GOC/Advanced Directives  - pt has capacity for decision making  - HCP completed today- 1)daughter Ashia Vega [phone], 2) Dave Welsh [phone]  - DNR and DNI, limited MOLST also completed, both on chart  - GOC meeting held today- plan for likely TRACY (hopefully with RT as per Dr. Osorio, or ok if only for a week then to start RT), RT, and further tx to be discussed with Dr. Waller outpt. See GOC note for further details.    ** Given sx are controlled and GOC are clear, will sign off. Please recall for further palliative issues**  Thank you for including us in Ms. Tavares's care.     Chris Fairbanks MD  Palliative Care Attending
84 yo F with small cell lung cancer s/p chemo/xrt 2016 and prophylactic cranial irradiation.  Subsequent chemotherapy and recent PET/CT SERGIO.  MRI reviewed and reveals two new brain mets as only site of disease.   Maintain decadron dose (DO NOT TAPER) at discharge as pt will be treated with Cyberknife radiosurgery and I will taper following treatment.  Plan discussed with pt, daughter Madina and palliative care team.  If pt going to rehab, [facility] is only one which may allow inpt radiation.  Pt aware of plan.
Discharge planning difficult as pt cannot be treated with radiosurgery from rehab and she already had whole brain radiotherapy. She must go home with full time aid if wishes to proceed with tx. Case dw pt and Dr. Valerio.

## 2018-03-16 NOTE — PROGRESS NOTE ADULT - NSHPATTENDINGPLANDISCUSS_GEN_ALL_CORE
patient and nursing, oncology
patient and nursing
patient and nursing, oncology
patient and nursing, oncology, DTR javi
patient and nursing, oncology, DTR javi, all consultants
patient and nursing, oncology, DTR javi, all consultants

## 2018-03-16 NOTE — PROGRESS NOTE ADULT - SUBJECTIVE AND OBJECTIVE BOX
CC:  Patient is a 83y old  Female who presents with a chief complaint of Left Shoulder and Pelvic Pain (06 Mar 2018 22:09)    SUBJECTIVE:  offers no new complaints.  lives at home in a private residence in the community w/ her family.  tripped and fell while ambulating with a cane.  slipped on a wet floor.  no prodromal symptoms.    ROS:  as above.    acetaminophen   Tablet 650 milliGRAM(s) Oral every 6 hours PRN  acetaminophen   Tablet. 650 milliGRAM(s) Oral every 6 hours PRN  aspirin enteric coated 81 milliGRAM(s) Oral daily  atorvastatin 10 milliGRAM(s) Oral at bedtime  dexamethasone  Injectable 4 milliGRAM(s) IV Push four times a day  docusate sodium 100 milliGRAM(s) Oral three times a day PRN  enalapril 1.25 milliGRAM(s) Oral daily  metoprolol succinate ER 50 milliGRAM(s) Oral daily  ondansetron Injectable 4 milliGRAM(s) IV Push every 6 hours PRN  senna 2 Tablet(s) Oral at bedtime PRN  spironolactone 25 milliGRAM(s) Oral daily    T(C): 36.2 (03-07-18 @ 12:31), Max: 36.8 (03-07-18 @ 05:01)  HR: 94 (03-07-18 @ 12:31) (93 - 115)  BP: 113/62 (03-07-18 @ 12:31) (98/64 - 129/77)  RR: 18 (03-07-18 @ 12:31) (18 - 18)  SpO2: 92% (03-07-18 @ 12:31) (92% - 100%)    PHYSICAL EXAM:  no acute distress.  lungs clear.  right chest wall MediPort.  heart regular.  abd soft.  NT.  ND.  ext (+) right arm sling.  NV intact.  neuro A&Ox3.                        8.8    8.47  )-----------( 214      ( 07 Mar 2018 09:10 )             28.8     03-07    140  |  105  |  20  ----------------------------<  130<H>  4.1   |  27  |  0.70    Ca    8.3<L>      07 Mar 2018 04:49  Mg     1.9     03-07    TPro  6.9  /  Alb  2.9<L>  /  TBili  0.7  /  DBili  x   /  AST  26  /  ALT  31  /  AlkPhos  125<H>  03-07
CC:  Patient is a 83y old  Female who presents with a chief complaint of Left Shoulder and Pelvic Pain (06 Mar 2018 22:09)  Lives at home in a private residence in the community w/ her family; pt  tripped and fell while ambulating with a cane.  slipped on a wet floor.  Incidentaloma: brain mets on a pt with h/o lung ca disease free recently.       3/10: comfortable when in bed, alot of pain if even rolled.  3/11: pain better controlled    ICU Vital Signs Last 24 Hrs  T(C): 36.6 (11 Mar 2018 13:13), Max: 36.6 (10 Mar 2018 20:55)  T(F): 97.8 (11 Mar 2018 13:13), Max: 97.8 (10 Mar 2018 20:55)  HR: 70 (11 Mar 2018 13:13) (70 - 77)  BP: 103/56 (11 Mar 2018 13:13) (100/51 - 118/55)  BP(mean): --  ABP: --  ABP(mean): --  RR: 17 (11 Mar 2018 13:13) (17 - 18)  SpO2: 99% (11 Mar 2018 13:13) (95% - 99%)        PHYSICAL EXAM:  R facial hematoma  no acute distress.  lungs clear.  right chest wall MediPort.  heart regular.  abd soft.  NT.  ND.  ext (+) right arm sling.  NV intact.  neuro A&Ox3.                        8.8    8.47  )-----------( 214      ( 07 Mar 2018 09:10 )             28.8     03-07    140  |  105  |  20  ----------------------------<  130<H>  4.1   |  27  |  0.70    Ca    8.3<L>      07 Mar 2018 04:49  Mg     1.9     03-07    TPro  6.9  /  Alb  2.9<L>  /  TBili  0.7  /  DBili  x   /  AST  26  /  ALT  31  /  AlkPhos  125<H>  03-07    < from: MR Femur w/wo IV Cont, Right (03.08.18 @ 18:28) >  IMPRESSION:   1.  Right superior and inferior pubic rami fractures.  2.  Right sacral alar fracture.  3.  Moderate strains of the bilateral adductor musculature.  4.  Colonic diverticulosis.  5.  Degenerative changes as above.    < end of copied text >
CC:  Patient is a 83y old  Female who presents with a chief complaint of Left Shoulder and Pelvic Pain (06 Mar 2018 22:09)  Lives at home in a private residence in the community w/ her family; pt  tripped and fell while ambulating with a cane.  slipped on a wet floor.  Incidentaloma: brain mets on a pt with h/o lung ca disease free recently.   Vital Signs Last 24 Hrs  T(C): 36.4 (08 Mar 2018 12:00), Max: 36.4 (07 Mar 2018 20:44)  T(F): 97.6 (08 Mar 2018 12:00), Max: 97.6 (08 Mar 2018 12:00)  HR: 85 (08 Mar 2018 12:00) (73 - 86)  BP: 119/42 (08 Mar 2018 12:00) (100/57 - 119/42)  BP(mean): --  RR: 17 (08 Mar 2018 12:00) (17 - 18)  SpO2: 95% (08 Mar 2018 12:00) (94% - 97%)      PHYSICAL EXAM:  no acute distress.  lungs clear.  right chest wall MediPort.  heart regular.  abd soft.  NT.  ND.  ext (+) right arm sling.  NV intact.  neuro A&Ox3.                        8.8    8.47  )-----------( 214      ( 07 Mar 2018 09:10 )             28.8     03-07    140  |  105  |  20  ----------------------------<  130<H>  4.1   |  27  |  0.70    Ca    8.3<L>      07 Mar 2018 04:49  Mg     1.9     03-07    TPro  6.9  /  Alb  2.9<L>  /  TBili  0.7  /  DBili  x   /  AST  26  /  ALT  31  /  AlkPhos  125<H>  03-07
HPI:  82 y/o F PMHx significant for metastatic Lung Cancer (StageIV),  diagnosed in 9/2016 and treated with chemo with carboplatin with excellent response. also received prophylactic cranial RT  recurred in 8/2017 . retreated with carbo and etoposide with excellent response  Last PET CT in 2/21/2018 negative for progression    fall at home while walking to the bathroom with resultant right shoulder and pelvic pain and discharged and called back to the ED   as imaging revealed a fracture of the right inferior pubic ramus with comminuted fracture of the right superior pubic       CT Head -> revealed a lesion in the left centrum semiovale/corona radiata with associated mild mass effect, and a right frontal lesion with vasogenic edema new since 8/2/2016. (06 Mar 2018 22:09)    pt statin rehab  awaiting discharge to start rehab and RT       PAST MEDICAL & SURGICAL HISTORY:  Pancreatic cancer  Mitral regurgitation  Chronic systolic congestive heart failure  Malignant neoplasm of lung, unspecified laterality, unspecified part of lung: Stage 4  Chronic obstructive pulmonary disease, unspecified COPD type  Diabetes mellitus: Type 2  No significant past surgical history  cardiomyopathy    REVIEW OF SYSTEMS    General:	  fatigued   c/o pain right shoulder and pelvis  Allergies    MEDICATIONS  (STANDING):  aspirin enteric coated 81 milliGRAM(s) Oral daily  atorvastatin 10 milliGRAM(s) Oral at bedtime  cefTRIAXone Injectable. 1000 milliGRAM(s) IV Push once  cefTRIAXone Injectable.      dexamethasone     Tablet 4 milliGRAM(s) Oral every 6 hours  metoprolol succinate ER 50 milliGRAM(s) Oral daily  pantoprazole    Tablet 40 milliGRAM(s) Oral before breakfast  spironolactone 25 milliGRAM(s) Oral daily        Vital Signs Last 24 Hrs  T(C): 36.3 (11 Mar 2018 04:50), Max: 36.6 (10 Mar 2018 20:55)  T(F): 97.3 (11 Mar 2018 04:50), Max: 97.8 (10 Mar 2018 20:55)  HR: 70 (11 Mar 2018 04:50) (70 - 91)  BP: 118/55 (11 Mar 2018 04:50) (100/51 - 128/66)  BP(mean): --  RR: 18 (11 Mar 2018 04:50) (18 - 18)  SpO2: 99% (11 Mar 2018 04:50) (95% - 99%)  )      Gen:     alert and oriented  bruise right forehead  chest clear  right arm in splint  appears comfortable                          9.4    12.34 )-----------( 276      ( 11 Mar 2018 06:40 )             29.8                   RADIOLOGY & ADDITIONAL STUDIES:      Head CT: There is no large cortical infarct or midline shift. There is a   1.8 x 1.8 x 1.9 cm round heterogeneous density lesion with surrounding   lucency/edema in the left centrum semiovale/corona radiata with mild mass   effect on the underlying left posterior lateral ventricle, which may   contain blood products/debris. There is nonspecific lucency with possible   1.1 x 1.5 x 1.2 cm underlying lesion (602:99-38 and 601:25) in the right   frontal white matter, suspicious for vasogenic edema.       IMPRESSION:    1.  Mildly impacted right proximal humeral surgical neck fracture with   nondisplaced extension to the greater and lesser tuberosities  2.  No osteoblastic or osteolytic lesion though limited by decreased bone   mineralization. Hyperdensity within the proximal humeral diaphysis is   thought to represent hemorrhage.  3.  Circumferential right upper extremity soft tissue swelling.   4.  Degenerative changes at the acromioclavicular joint.  5.  Emphysematous changes partially imaged.      Impression: Fractures of the right superior and inferior pubic rami.  Associated contusion/hematoma of the right internal obturator muscle with   stranding in the fat in the right pelvis suggesting small amount of   hematoma.    PET CT 2/21/2018 MAR  no systemic mets
CC:  Patient is a 83y old  Female who presents with a chief complaint of Left Shoulder and Pelvic Pain (06 Mar 2018 22:09)  Lives at home in a private residence in the community w/ her family; pt  tripped and fell while ambulating with a cane.  slipped on a wet floor.  Incidentaloma: brain mets on a pt with h/o lung ca disease free recently.          3/13: status quo. DTR Ashia given updates regarding her status. Realistically, pt needs several weeks of [facility] which would postpone her RTX. She is very de-conditioned and weak. DTR deciding on taking her home with private hir so that she can complete her RTX and will then consider placing her when complete  3/14: uneventful. was able to walk 3-4 feet with PT      ICU Vital Signs Last 24 Hrs  T(C): 36.3 (14 Mar 2018 13:35), Max: 36.8 (13 Mar 2018 20:21)  T(F): 97.3 (14 Mar 2018 13:35), Max: 98.3 (13 Mar 2018 20:21)  HR: 87 (14 Mar 2018 13:35) (71 - 87)  BP: 138/72 (14 Mar 2018 13:35) (99/48 - 138/72)  BP(mean): --  ABP: --  ABP(mean): --  RR: 18 (14 Mar 2018 13:35) (18 - 18)  SpO2: 94% (14 Mar 2018 13:35) (94% - 98%)            PHYSICAL EXAM:  R facial hematoma  no acute distress.  lungs clear.  right chest wall MediPort.  heart regular.  abd soft.  NT.  ND.  ext (+) right arm sling.  NV intact.  neuro A&Ox3.                        8.8    8.47  )-----------( 214      ( 07 Mar 2018 09:10 )             28.8     03-07    140  |  105  |  20  ----------------------------<  130<H>  4.1   |  27  |  0.70    Ca    8.3<L>      07 Mar 2018 04:49  Mg     1.9     03-07    TPro  6.9  /  Alb  2.9<L>  /  TBili  0.7  /  DBili  x   /  AST  26  /  ALT  31  /  AlkPhos  125<H>  03-07    < from: MR Femur w/wo IV Cont, Right (03.08.18 @ 18:28) >  IMPRESSION:   1.  Right superior and inferior pubic rami fractures.  2.  Right sacral alar fracture.  3.  Moderate strains of the bilateral adductor musculature.  4.  Colonic diverticulosis.  5.  Degenerative changes as above.    < end of copied text >
CC:  Patient is a 83y old  Female who presents with a chief complaint of Left Shoulder and Pelvic Pain (06 Mar 2018 22:09)  Lives at home in a private residence in the community w/ her family; pt  tripped and fell while ambulating with a cane.  slipped on a wet floor.  Incidentaloma: brain mets on a pt with h/o lung ca disease free recently.          3/13: status quo. DTR Ashia given updates regarding her status. Realistically, pt needs several weeks of [facility] which would postpone her RTX. She is very de-conditioned and weak. DTR deciding on taking her home with private hire so that she can complete her RTX and will then consider placing her when complete  3/14: uneventful. was able to walk 3-4 feet with PT  3/15: no o/n events      ICU Vital Signs Last 24 Hrs  T(C): 36.4 (16 Mar 2018 04:26), Max: 36.4 (15 Mar 2018 13:34)  T(F): 97.6 (16 Mar 2018 04:26), Max: 97.6 (16 Mar 2018 04:26)  HR: 66 (16 Mar 2018 04:26) (66 - 88)  BP: 114/55 (16 Mar 2018 04:26) (112/57 - 114/55)  BP(mean): --  ABP: --  ABP(mean): --  RR: 18 (16 Mar 2018 04:26) (18 - 18)  SpO2: 99% (16 Mar 2018 04:26) (97% - 99%)              PHYSICAL EXAM:  R facial hematoma  no acute distress.  lungs clear.  right chest wall MediPort.  heart regular.  abd soft.  NT.  ND.  ext (+) right arm sling.  NV intact.  neuro A&Ox3.                        8.8    8.47  )-----------( 214      ( 07 Mar 2018 09:10 )             28.8     03-07    140  |  105  |  20  ----------------------------<  130<H>  4.1   |  27  |  0.70    Ca    8.3<L>      07 Mar 2018 04:49  Mg     1.9     03-07    TPro  6.9  /  Alb  2.9<L>  /  TBili  0.7  /  DBili  x   /  AST  26  /  ALT  31  /  AlkPhos  125<H>  03-07    < from: MR Femur w/wo IV Cont, Right (03.08.18 @ 18:28) >  IMPRESSION:   1.  Right superior and inferior pubic rami fractures.  2.  Right sacral alar fracture.  3.  Moderate strains of the bilateral adductor musculature.  4.  Colonic diverticulosis.  5.  Degenerative changes as above.    < end of copied text >
CC:  Patient is a 83y old  Female who presents with a chief complaint of Left Shoulder and Pelvic Pain (06 Mar 2018 22:09)  Lives at home in a private residence in the community w/ her family; pt  tripped and fell while ambulating with a cane.  slipped on a wet floor.  Incidentaloma: brain mets on a pt with h/o lung ca disease free recently.       3/10: comfortable when in bed, alot of pain if even rolled.    ICU Vital Signs Last 24 Hrs  T(C): 36.4 (10 Mar 2018 04:03), Max: 36.8 (09 Mar 2018 21:15)  T(F): 97.5 (10 Mar 2018 04:03), Max: 98.3 (09 Mar 2018 21:15)  HR: 74 (10 Mar 2018 05:40) (73 - 83)  BP: 117/53 (10 Mar 2018 05:40) (107/47 - 121/55)  BP(mean): --  ABP: --  ABP(mean): --  RR: 18 (10 Mar 2018 04:03) (18 - 18)  SpO2: 96% (10 Mar 2018 04:03) (94% - 96%)        PHYSICAL EXAM:  R facial hematoma  no acute distress.  lungs clear.  right chest wall MediPort.  heart regular.  abd soft.  NT.  ND.  ext (+) right arm sling.  NV intact.  neuro A&Ox3.                        8.8    8.47  )-----------( 214      ( 07 Mar 2018 09:10 )             28.8     03-07    140  |  105  |  20  ----------------------------<  130<H>  4.1   |  27  |  0.70    Ca    8.3<L>      07 Mar 2018 04:49  Mg     1.9     03-07    TPro  6.9  /  Alb  2.9<L>  /  TBili  0.7  /  DBili  x   /  AST  26  /  ALT  31  /  AlkPhos  125<H>  03-07    < from: MR Femur w/wo IV Cont, Right (03.08.18 @ 18:28) >  IMPRESSION:   1.  Right superior and inferior pubic rami fractures.  2.  Right sacral alar fracture.  3.  Moderate strains of the bilateral adductor musculature.  4.  Colonic diverticulosis.  5.  Degenerative changes as above.    < end of copied text >
CC:  Patient is a 83y old  Female who presents with a chief complaint of Left Shoulder and Pelvic Pain (06 Mar 2018 22:09)  Lives at home in a private residence in the community w/ her family; pt  tripped and fell while ambulating with a cane.  slipped on a wet floor.  Incidentaloma: brain mets on a pt with h/o lung ca disease free recently.       3/10: comfortable when in bed, alot of pain if even rolled.  3/11: pain better controlled  3/12: sitting in chair. pt is 2 person assist for standing. Concerned she will not do well at [facility] and will also prolong her tx course. She may laso further decompensate with tx. I have d/w her.   3/13: status quo. DTR Ashia given updates regarding her status. Realistically, pt needs several weeks of TRACY which would postpone her RTX. She is very de-conditioned and weak. DTR deciding on taking her home with private hir so that she can complete her RTX and will then consider placing her when complete  ICU Vital Signs Last 24 Hrs  T(C): 36.3 (13 Mar 2018 05:10), Max: 36.7 (12 Mar 2018 20:11)  T(F): 97.3 (13 Mar 2018 05:10), Max: 98.1 (12 Mar 2018 20:11)  HR: 77 (13 Mar 2018 05:10) (77 - 91)  BP: 106/55 (13 Mar 2018 05:10) (106/55 - 157/65)  BP(mean): --  ABP: --  ABP(mean): --  RR: 18 (13 Mar 2018 05:10) (16 - 18)  SpO2: 95% (13 Mar 2018 05:10) (95% - 100%)          PHYSICAL EXAM:  R facial hematoma  no acute distress.  lungs clear.  right chest wall MediPort.  heart regular.  abd soft.  NT.  ND.  ext (+) right arm sling.  NV intact.  neuro A&Ox3.                        8.8    8.47  )-----------( 214      ( 07 Mar 2018 09:10 )             28.8     03-07    140  |  105  |  20  ----------------------------<  130<H>  4.1   |  27  |  0.70    Ca    8.3<L>      07 Mar 2018 04:49  Mg     1.9     03-07    TPro  6.9  /  Alb  2.9<L>  /  TBili  0.7  /  DBili  x   /  AST  26  /  ALT  31  /  AlkPhos  125<H>  03-07    < from: MR Femur w/wo IV Cont, Right (03.08.18 @ 18:28) >  IMPRESSION:   1.  Right superior and inferior pubic rami fractures.  2.  Right sacral alar fracture.  3.  Moderate strains of the bilateral adductor musculature.  4.  Colonic diverticulosis.  5.  Degenerative changes as above.    < end of copied text >
CC:  Patient is a 83y old  Female who presents with a chief complaint of Left Shoulder and Pelvic Pain (06 Mar 2018 22:09)  Lives at home in a private residence in the community w/ her family; pt  tripped and fell while ambulating with a cane.  slipped on a wet floor.  Incidentaloma: brain mets on a pt with h/o lung ca disease free recently.       3/10: comfortable when in bed, alot of pain if even rolled.  3/11: pain better controlled  3/12: sitting in chair. pt is 2 person assist for standing. Concerned she will not do well at [facility] and will also prolong her tx course. She may laso further decompensate with tx. I have d/w her.     ICU Vital Signs Last 24 Hrs  T(C): 36.4 (12 Mar 2018 05:00), Max: 36.4 (12 Mar 2018 05:00)  T(F): 97.5 (12 Mar 2018 05:00), Max: 97.5 (12 Mar 2018 05:00)  HR: 71 (12 Mar 2018 06:14) (71 - 82)  BP: 125/60 (12 Mar 2018 06:14) (107/62 - 125/60)  BP(mean): --  ABP: --  ABP(mean): --  RR: 18 (11 Mar 2018 20:00) (18 - 18)  SpO2: 94% (12 Mar 2018 05:00) (94% - 96%)          PHYSICAL EXAM:  R facial hematoma  no acute distress.  lungs clear.  right chest wall MediPort.  heart regular.  abd soft.  NT.  ND.  ext (+) right arm sling.  NV intact.  neuro A&Ox3.                        8.8    8.47  )-----------( 214      ( 07 Mar 2018 09:10 )             28.8     03-07    140  |  105  |  20  ----------------------------<  130<H>  4.1   |  27  |  0.70    Ca    8.3<L>      07 Mar 2018 04:49  Mg     1.9     03-07    TPro  6.9  /  Alb  2.9<L>  /  TBili  0.7  /  DBili  x   /  AST  26  /  ALT  31  /  AlkPhos  125<H>  03-07    < from: MR Femur w/wo IV Cont, Right (03.08.18 @ 18:28) >  IMPRESSION:   1.  Right superior and inferior pubic rami fractures.  2.  Right sacral alar fracture.  3.  Moderate strains of the bilateral adductor musculature.  4.  Colonic diverticulosis.  5.  Degenerative changes as above.    < end of copied text >
CC:  Patient is a 83y old  Female who presents with a chief complaint of Left Shoulder and Pelvic Pain (06 Mar 2018 22:09)  Lives at home in a private residence in the community w/ her family; pt  tripped and fell while ambulating with a cane.  slipped on a wet floor.  Incidentaloma: brain mets on a pt with h/o lung ca disease free recently.     Vital Signs Last 24 Hrs  T(C): 36.6 (09 Mar 2018 04:32), Max: 36.9 (08 Mar 2018 20:40)  T(F): 97.9 (09 Mar 2018 04:32), Max: 98.4 (08 Mar 2018 20:40)  HR: 83 (09 Mar 2018 12:58) (76 - 88)  BP: 107/47 (09 Mar 2018 12:58) (103/49 - 124/57)  BP(mean): --  RR: 17 (09 Mar 2018 04:32) (17 - 18)  SpO2: 98% (09 Mar 2018 04:32) (98% - 98%)      PHYSICAL EXAM:  no acute distress.  lungs clear.  right chest wall MediPort.  heart regular.  abd soft.  NT.  ND.  ext (+) right arm sling.  NV intact.  neuro A&Ox3.                        8.8    8.47  )-----------( 214      ( 07 Mar 2018 09:10 )             28.8     03-07    140  |  105  |  20  ----------------------------<  130<H>  4.1   |  27  |  0.70    Ca    8.3<L>      07 Mar 2018 04:49  Mg     1.9     03-07    TPro  6.9  /  Alb  2.9<L>  /  TBili  0.7  /  DBili  x   /  AST  26  /  ALT  31  /  AlkPhos  125<H>  03-07
HPI: Pt seen and examined this am in follow up for sx and GOC. Pt feeling well. Denies pain or discomfort. Open to GOC discussion with daughter via phone, see GOC note preceding this for more details.       PAIN: denies, well controlled on current pain meds (only 2 doses oxy needed in past 24h)    DYSPNEA: denies      ROS:    All other systems reviewed and negative      PHYSICAL EXAM:    Vital Signs Last 24 Hrs  T(C): 36.6 (09 Mar 2018 04:32), Max: 36.9 (08 Mar 2018 20:40)  T(F): 97.9 (09 Mar 2018 04:32), Max: 98.4 (08 Mar 2018 20:40)  HR: 76 (09 Mar 2018 04:32) (76 - 88)  BP: 124/57 (09 Mar 2018 04:32) (103/49 - 124/57)  BP(mean): --  RR: 17 (09 Mar 2018 04:32) (17 - 18)  SpO2: 98% (09 Mar 2018 04:32) (95% - 98%)  Daily     Daily     PPSV2: 30  %    General: Elderly female, sitting up in bed, pleasant, [place] (normally has hearing aid in right ear) NAD  Mental Status: AOx4  HEENT: + bruising over right eye, perrl, eomi  Lungs: clear  Cardiac: +s1 s2 rrr  GI: soft nt nd +bs  : voids  Ext: R arm in sling, no edema, rom without pain in LE  Neuro: no focal deficits    LABS:                        8.3    11.11 )-----------( 199      ( 08 Mar 2018 05:51 )             26.8     03-08    x   |  x   |  29<H>  ----------------------------<  x   x    |  x   |  0.62          Albumin: Albumin, Serum: 2.9 g/dL (03-07 @ 04:49)      Allergies    No Known Allergies    Intolerances      MEDICATIONS  (STANDING):  aspirin enteric coated 81 milliGRAM(s) Oral daily  atorvastatin 10 milliGRAM(s) Oral at bedtime  dexamethasone  Injectable 4 milliGRAM(s) IV Push four times a day  metoprolol succinate ER 50 milliGRAM(s) Oral daily  spironolactone 25 milliGRAM(s) Oral daily    MEDICATIONS  (PRN):  acetaminophen   Tablet 650 milliGRAM(s) Oral every 6 hours PRN For Temp greater than 38 C (100.4 F)  acetaminophen   Tablet. 650 milliGRAM(s) Oral every 6 hours PRN Mild Pain (1 - 3)  docusate sodium 100 milliGRAM(s) Oral three times a day PRN Constipation  ondansetron Injectable 4 milliGRAM(s) IV Push every 6 hours PRN Nausea  oxyCODONE    IR 5 milliGRAM(s) Oral every 6 hours PRN Moderate Pain (4 - 6)  oxyCODONE    IR 2.5 milliGRAM(s) Oral every 6 hours PRN Mild Pain (1 - 3)  senna 2 Tablet(s) Oral at bedtime PRN Constipation      RADIOLOGY:
Orthopedic Upper Extremity    Pt S/E at bedside, pain currently controlled in right shoulder, in sling    Vital Signs Last 24 Hrs  T(C): 36.6 (10 Mar 2018 20:55), Max: 36.6 (10 Mar 2018 20:55)  T(F): 97.8 (10 Mar 2018 20:55), Max: 97.8 (10 Mar 2018 20:55)  HR: 77 (10 Mar 2018 20:55) (74 - 91)  BP: 100/51 (10 Mar 2018 20:55) (100/51 - 128/66)  BP(mean): --  RR: 18 (10 Mar 2018 20:55) (18 - 18)  SpO2: 95% (10 Mar 2018 20:55) (95% - 97%)  Gen: NAD, AAOx3    RUE:  minimal swelling at right shoulder  +AIN/PIN/M/R/U/Msc/Ax  SILT C5-T1  +Radial Pulse  Compartments soft  No calf TTP B/L    83F with right proximal humerus fracture  -pain control  -NWB RUE in sling  -can start gentle PROM 4 weeks post injury date  -follow up with Dr Elizabeth 1 week after discharge from hospital  -pelvic fracture being managed by Dr Ramírez, WBAT in bilateral lower extremities  -no surgical intervention for right proximal humerus fracture at this time  -ortho stable for discharge please reconsult for any new concerns
Patient is a 83y old  Female who presents with a chief complaint of Left Shoulder and Pelvic Pain (06 Mar 2018 22:09)      HPI:  82 y/o F PMHx significant for metastatic Lung Cancer (StageIV), severe COPD, NIDDM,   pancreatic Ca, CHF (HFrEF; LVEF 20-25%), and moderate mitral regurgitation who was   initially evaluated in the ED on 3/6 for a fall at home while walking to the bathroom with resultant right shoulder and pelvic pain and discharged and called back to the ED   as imaging performed officially revealed a fracture at the greater tuberosity in Right   Humeral head. In the ED the patient additionally c/o severe pelvic pain. XR right   Humerus -> revealed an incomplete cortical offset fracture through the greater   tuberosity of the right humeral head. CT Pelvis -> revealed a nondisplaced fracture of   the right inferior pubic ramus with comminuted fracture of the right superior pubic   ramus with associated contusion/hematoma of the right internal obturator muscle with stranding in the fat in the right pelvis suggesting small amount of hematoma. CT Head -> revealed a lesion in the left centrum semiovale/corona radiata with associated mild mass effect, and a right frontal lesion with vasogenic edema new since 8/2/2016. (06 Mar 2018 22:09)      PAST MEDICAL & SURGICAL HISTORY:  Pancreatic cancer  Mitral regurgitation  Chronic systolic congestive heart failure  Malignant neoplasm of lung, unspecified laterality, unspecified part of lung: Stage 4  Chronic obstructive pulmonary disease, unspecified COPD type  Diabetes mellitus: Type 2  No significant past surgical history      MEDICATIONS  (STANDING):  aspirin enteric coated 81 milliGRAM(s) Oral daily  atorvastatin 10 milliGRAM(s) Oral at bedtime  dexamethasone     Tablet 4 milliGRAM(s) Oral every 6 hours  heparin  Injectable 5000 Unit(s) SubCutaneous every 8 hours  metoprolol succinate ER 50 milliGRAM(s) Oral daily  pantoprazole    Tablet 40 milliGRAM(s) Oral before breakfast  spironolactone 25 milliGRAM(s) Oral daily    MEDICATIONS  (PRN):  acetaminophen   Tablet 650 milliGRAM(s) Oral every 6 hours PRN For Temp greater than 38 C (100.4 F)  acetaminophen   Tablet. 650 milliGRAM(s) Oral every 6 hours PRN Mild Pain (1 - 3)  docusate sodium 100 milliGRAM(s) Oral three times a day PRN Constipation  ondansetron Injectable 4 milliGRAM(s) IV Push every 6 hours PRN Nausea  oxyCODONE    IR 5 milliGRAM(s) Oral every 6 hours PRN Moderate Pain (4 - 6)  oxyCODONE    IR 2.5 milliGRAM(s) Oral every 6 hours PRN Mild Pain (1 - 3)  senna 2 Tablet(s) Oral at bedtime PRN Constipation      PHYSICAL EXAM:  Vital Signs Last 24 Hrs  T(C): 36.3 (14 Mar 2018 05:22), Max: 36.8 (13 Mar 2018 20:21)  T(F): 97.3 (14 Mar 2018 05:22), Max: 98.3 (13 Mar 2018 20:21)  HR: 71 (14 Mar 2018 05:22) (71 - 86)  BP: 126/59 (14 Mar 2018 05:22) (99/48 - 126/59)  BP(mean): --  RR: 18 (14 Mar 2018 05:22) (18 - 18)  SpO2: 96% (14 Mar 2018 05:22) (96% - 100%)  Head: no alopecia or scars  Neck: no palpable lymphadenopathy  Lungs: Clear to ascultation bilaterally and no wheezes  Cardiac: normal S1, S2, no murmurs  Abdomen: soft, nontender with no ascites  Extremities: no peripheral edema, good pulses bilaterally  Neuro: A & O, CNs II-XII intact. Motor strength 5/5 throughout and strength 5/5 throughout. Ambulatory    LABS:  CBC Full  -  ( 13 Mar 2018 05:47 )  WBC Count : 15.22 K/uL  Hemoglobin : 9.9 g/dL  Hematocrit : 31.3 %  Platelet Count - Automated : 313 K/uL  Mean Cell Volume : 84.4 fl  Mean Cell Hemoglobin : 26.7 pg  Mean Cell Hemoglobin Concentration : 31.6 gm/dL  Auto Neutrophil # : x  Auto Lymphocyte # : x  Auto Monocyte # : x  Auto Eosinophil # : x  Auto Basophil # : x  Auto Neutrophil % : x  Auto Lymphocyte % : x  Auto Monocyte % : x  Auto Eosinophil % : x  Auto Basophil % : x            RADIOLOGY:
Patient is a 83y old  Female who presents with a chief complaint of Left Shoulder and Pelvic Pain (06 Mar 2018 22:09)      HPI:  82 y/o F PMHx significant for metastatic Lung Cancer (StageIV), severe COPD, NIDDM,   pancreatic Ca, CHF (HFrEF; LVEF 20-25%), and moderate mitral regurgitation who was   initially evaluated in the ED on 3/6 for a fall at home while walking to the bathroom with resultant right shoulder and pelvic pain and discharged and called back to the ED   as imaging performed officially revealed a fracture at the greater tuberosity in Right   Humeral head. In the ED the patient additionally c/o severe pelvic pain. XR right   Humerus -> revealed an incomplete cortical offset fracture through the greater   tuberosity of the right humeral head. CT Pelvis -> revealed a nondisplaced fracture of   the right inferior pubic ramus with comminuted fracture of the right superior pubic   ramus with associated contusion/hematoma of the right internal obturator muscle with stranding in the fat in the right pelvis suggesting small amount of hematoma. CT Head -> revealed a lesion in the left centrum semiovale/corona radiata with associated mild mass effect, and a right frontal lesion with vasogenic edema new since 8/2/2016. (06 Mar 2018 22:09)      PAST MEDICAL & SURGICAL HISTORY:  Pancreatic cancer  Mitral regurgitation  Chronic systolic congestive heart failure  Malignant neoplasm of lung, unspecified laterality, unspecified part of lung: Stage 4  Chronic obstructive pulmonary disease, unspecified COPD type  Diabetes mellitus: Type 2  No significant past surgical history      MEDICATIONS  (STANDING):  aspirin enteric coated 81 milliGRAM(s) Oral daily  atorvastatin 10 milliGRAM(s) Oral at bedtime  dexamethasone  Injectable 4 milliGRAM(s) IV Push four times a day  metoprolol succinate ER 50 milliGRAM(s) Oral daily  spironolactone 25 milliGRAM(s) Oral daily    MEDICATIONS  (PRN):  acetaminophen   Tablet 650 milliGRAM(s) Oral every 6 hours PRN For Temp greater than 38 C (100.4 F)  acetaminophen   Tablet. 650 milliGRAM(s) Oral every 6 hours PRN Mild Pain (1 - 3)  docusate sodium 100 milliGRAM(s) Oral three times a day PRN Constipation  ondansetron Injectable 4 milliGRAM(s) IV Push every 6 hours PRN Nausea  oxyCODONE    IR 5 milliGRAM(s) Oral every 6 hours PRN Moderate Pain (4 - 6)  oxyCODONE    IR 2.5 milliGRAM(s) Oral every 6 hours PRN Mild Pain (1 - 3)  senna 2 Tablet(s) Oral at bedtime PRN Constipation      PHYSICAL EXAM:  Vital Signs Last 24 Hrs  T(C): 36.6 (09 Mar 2018 04:32), Max: 36.9 (08 Mar 2018 20:40)  T(F): 97.9 (09 Mar 2018 04:32), Max: 98.4 (08 Mar 2018 20:40)  HR: 76 (09 Mar 2018 04:32) (76 - 88)  BP: 124/57 (09 Mar 2018 04:32) (103/49 - 124/57)  BP(mean): --  RR: 17 (09 Mar 2018 04:32) (17 - 18)  SpO2: 98% (09 Mar 2018 04:32) (95% - 98%)  Head: no alopecia or scars  Neck: no palpable lymphadenopathy  Lungs: Clear to ascultation bilaterally and no wheezes  Cardiac: normal S1, S2, no murmurs  Abdomen: soft, nontender with no ascites  Extremities: no peripheral edema, good pulses bilaterally  Neuro: A & O, CNs II-XII intact. Motor strength 5/5 throughout and strength 5/5 throughout. Ambulatory    LABS:  CBC Full  -  ( 08 Mar 2018 05:51 )  WBC Count : 11.11 K/uL  Hemoglobin : 8.3 g/dL  Hematocrit : 26.8 %  Platelet Count - Automated : 199 K/uL  Mean Cell Volume : 86.7 fl  Mean Cell Hemoglobin : 26.9 pg  Mean Cell Hemoglobin Concentration : 31.0 gm/dL  Auto Neutrophil # : x  Auto Lymphocyte # : x  Auto Monocyte # : x  Auto Eosinophil # : x  Auto Basophil # : x  Auto Neutrophil % : x  Auto Lymphocyte % : x  Auto Monocyte % : x  Auto Eosinophil % : x  Auto Basophil % : x    03-08    x   |  x   |  29<H>  ----------------------------<  x   x    |  x   |  0.62          RADIOLOGY:  < from: MR Head w/wo IV Cont (03.07.18 @ 11:14) >  EXAM:  MR BRAIN WAW IC                            PROCEDURE DATE:  03/07/2018          INTERPRETATION:  MRI brain with and without contrast  Contrast Gadavist 6.5 cc; 1 cc discarded  History lung carcinoma, staging  Comparison noncontrast CT performed the prior day      There are 2 ring-enhancing intracranial mass lesions, one right frontal   measuring 1.6 cm in long axis dimension and a larger in the left   parietal-occipital region measuring 2.3 cm. Both demonstrate mild   vasogenic edema without mass effect or shift or hemorrhage. There is no   cortical edema or hydrocephalus. There is mild to moderate underlying   generalized volume loss and chronic microvascular ischemic change in the   periventricular white matter. The orbital and sellar contents and   cerebellar tonsils are within normal limits.    IMPRESSION:    Intracranial metastases as above      LUISA CHURCH   This document has been electronically signed. Mar  7 2018 11:24AM

## 2018-03-16 NOTE — PROGRESS NOTE ADULT - PROVIDER SPECIALTY LIST ADULT
Hospitalist
Orthopedics
Palliative Care
Rad Onc
Rad Onc
Hospitalist
Heme/Onc

## 2018-03-20 DIAGNOSIS — C34.90 MALIGNANT NEOPLASM OF UNSPECIFIED PART OF UNSPECIFIED BRONCHUS OR LUNG: ICD-10-CM

## 2018-03-20 DIAGNOSIS — S42.301A UNSPECIFIED FRACTURE OF SHAFT OF HUMERUS, RIGHT ARM, INITIAL ENCOUNTER FOR CLOSED FRACTURE: ICD-10-CM

## 2018-03-20 DIAGNOSIS — D62 ACUTE POSTHEMORRHAGIC ANEMIA: ICD-10-CM

## 2018-03-20 DIAGNOSIS — I34.0 NONRHEUMATIC MITRAL (VALVE) INSUFFICIENCY: ICD-10-CM

## 2018-03-20 DIAGNOSIS — S32.82XA MULTIPLE FRACTURES OF PELVIS WITHOUT DISRUPTION OF PELVIC RING, INITIAL ENCOUNTER FOR CLOSED FRACTURE: ICD-10-CM

## 2018-03-20 DIAGNOSIS — N39.0 URINARY TRACT INFECTION, SITE NOT SPECIFIED: ICD-10-CM

## 2018-03-20 DIAGNOSIS — I50.22 CHRONIC SYSTOLIC (CONGESTIVE) HEART FAILURE: ICD-10-CM

## 2018-03-20 DIAGNOSIS — Z79.82 LONG TERM (CURRENT) USE OF ASPIRIN: ICD-10-CM

## 2018-03-20 DIAGNOSIS — C79.31 SECONDARY MALIGNANT NEOPLASM OF BRAIN: ICD-10-CM

## 2018-03-20 DIAGNOSIS — Z66 DO NOT RESUSCITATE: ICD-10-CM
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
## 2018-03-20 DIAGNOSIS — Z79.899 OTHER LONG TERM (CURRENT) DRUG THERAPY: ICD-10-CM

## 2018-03-20 DIAGNOSIS — S42.201A UNSPECIFIED FRACTURE OF UPPER END OF RIGHT HUMERUS, INITIAL ENCOUNTER FOR CLOSED FRACTURE: ICD-10-CM
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
## 2018-03-20 DIAGNOSIS — E43 UNSPECIFIED SEVERE PROTEIN-CALORIE MALNUTRITION: ICD-10-CM

## 2018-03-20 DIAGNOSIS — J44.9 CHRONIC OBSTRUCTIVE PULMONARY DISEASE, UNSPECIFIED: ICD-10-CM

## 2018-03-20 DIAGNOSIS — Z99.81 DEPENDENCE ON SUPPLEMENTAL OXYGEN: ICD-10-CM

## 2018-03-20 DIAGNOSIS — D72.829 ELEVATED WHITE BLOOD CELL COUNT, UNSPECIFIED: ICD-10-CM

## 2018-03-20 DIAGNOSIS — G93.6 CEREBRAL EDEMA: ICD-10-CM

## 2018-03-20 DIAGNOSIS — E11.9 TYPE 2 DIABETES MELLITUS WITHOUT COMPLICATIONS: ICD-10-CM

## 2018-03-29 ENCOUNTER — INPATIENT (INPATIENT)
Facility: HOSPITAL | Age: 83
LOS: 7 days | Discharge: HOME | End: 2018-04-06
Attending: HOSPITALIST | Admitting: HOSPITALIST
Payer: MEDICARE

## 2018-03-29 VITALS
OXYGEN SATURATION: 100 % | DIASTOLIC BLOOD PRESSURE: 43 MMHG | SYSTOLIC BLOOD PRESSURE: 100 MMHG | WEIGHT: 119.93 LBS | TEMPERATURE: 98 F | RESPIRATION RATE: 18 BRPM | HEART RATE: 85 BPM | HEIGHT: 64 IN

## 2018-03-29 LAB
APPEARANCE UR: CLEAR — SIGNIFICANT CHANGE UP
BASE EXCESS BLDV CALC-SCNC: 1.1 MMOL/L — SIGNIFICANT CHANGE UP (ref -2–2)
BILIRUB UR-MCNC: NEGATIVE — SIGNIFICANT CHANGE UP
COLOR SPEC: YELLOW — SIGNIFICANT CHANGE UP
DIFF PNL FLD: (no result)
GLUCOSE BLDC GLUCOMTR-MCNC: 200 MG/DL — HIGH (ref 70–99)
GLUCOSE BLDC GLUCOMTR-MCNC: 378 MG/DL — HIGH (ref 70–99)
GLUCOSE UR QL: 1000 MG/DL
HCO3 BLDV-SCNC: 26 MMOL/L — SIGNIFICANT CHANGE UP (ref 21–29)
KETONES UR-MCNC: NEGATIVE — SIGNIFICANT CHANGE UP
LEUKOCYTE ESTERASE UR-ACNC: NEGATIVE — SIGNIFICANT CHANGE UP
NITRITE UR-MCNC: NEGATIVE — SIGNIFICANT CHANGE UP
PCO2 BLDV: 47 MMHG — SIGNIFICANT CHANGE UP (ref 35–50)
PH BLDV: 7.36 — SIGNIFICANT CHANGE UP (ref 7.35–7.45)
PH UR: 5 — SIGNIFICANT CHANGE UP (ref 5–8)
PO2 BLDV: 145 MMHG — HIGH (ref 25–45)
PROT UR-MCNC: 15 MG/DL
SAO2 % BLDV: 99 % — HIGH (ref 67–88)
SP GR SPEC: 1.02 — SIGNIFICANT CHANGE UP (ref 1.01–1.02)
UROBILINOGEN FLD QL: 1 MG/DL

## 2018-03-29 PROCEDURE — 71045 X-RAY EXAM CHEST 1 VIEW: CPT | Mod: 26

## 2018-03-29 PROCEDURE — 93010 ELECTROCARDIOGRAM REPORT: CPT

## 2018-03-29 PROCEDURE — 99285 EMERGENCY DEPT VISIT HI MDM: CPT

## 2018-03-29 RX ORDER — DEXAMETHASONE 0.5 MG/5ML
1 ELIXIR ORAL
Qty: 0 | Refills: 0 | COMMUNITY

## 2018-03-29 RX ORDER — DEXAMETHASONE 0.5 MG/5ML
4 ELIXIR ORAL EVERY 6 HOURS
Qty: 0 | Refills: 0 | Status: DISCONTINUED | OUTPATIENT
Start: 2018-03-29 | End: 2018-03-29

## 2018-03-29 RX ORDER — DEXTROSE 50 % IN WATER 50 %
25 SYRINGE (ML) INTRAVENOUS ONCE
Qty: 0 | Refills: 0 | Status: DISCONTINUED | OUTPATIENT
Start: 2018-03-29 | End: 2018-04-06

## 2018-03-29 RX ORDER — INSULIN GLARGINE 100 [IU]/ML
10 INJECTION, SOLUTION SUBCUTANEOUS ONCE
Qty: 0 | Refills: 0 | Status: COMPLETED | OUTPATIENT
Start: 2018-03-29 | End: 2018-03-29

## 2018-03-29 RX ORDER — SODIUM CHLORIDE 9 MG/ML
1000 INJECTION, SOLUTION INTRAVENOUS
Qty: 0 | Refills: 0 | Status: DISCONTINUED | OUTPATIENT
Start: 2018-03-29 | End: 2018-04-06

## 2018-03-29 RX ORDER — DEXAMETHASONE 0.5 MG/5ML
4 ELIXIR ORAL THREE TIMES A DAY
Qty: 0 | Refills: 0 | Status: DISCONTINUED | OUTPATIENT
Start: 2018-03-29 | End: 2018-04-06

## 2018-03-29 RX ORDER — ASPIRIN/CALCIUM CARB/MAGNESIUM 324 MG
1 TABLET ORAL
Qty: 0 | Refills: 0 | COMMUNITY

## 2018-03-29 RX ORDER — PANTOPRAZOLE SODIUM 20 MG/1
1 TABLET, DELAYED RELEASE ORAL
Qty: 0 | Refills: 0 | COMMUNITY

## 2018-03-29 RX ORDER — OXYCODONE AND ACETAMINOPHEN 5; 325 MG/1; MG/1
1 TABLET ORAL EVERY 6 HOURS
Qty: 0 | Refills: 0 | Status: DISCONTINUED | OUTPATIENT
Start: 2018-03-29 | End: 2018-03-31

## 2018-03-29 RX ORDER — GLUCAGON INJECTION, SOLUTION 0.5 MG/.1ML
1 INJECTION, SOLUTION SUBCUTANEOUS ONCE
Qty: 0 | Refills: 0 | Status: DISCONTINUED | OUTPATIENT
Start: 2018-03-29 | End: 2018-04-06

## 2018-03-29 RX ORDER — SODIUM CHLORIDE 9 MG/ML
1000 INJECTION INTRAMUSCULAR; INTRAVENOUS; SUBCUTANEOUS ONCE
Qty: 0 | Refills: 0 | Status: COMPLETED | OUTPATIENT
Start: 2018-03-29 | End: 2018-03-29

## 2018-03-29 RX ORDER — DEXTROSE 50 % IN WATER 50 %
1 SYRINGE (ML) INTRAVENOUS ONCE
Qty: 0 | Refills: 0 | Status: DISCONTINUED | OUTPATIENT
Start: 2018-03-29 | End: 2018-04-06

## 2018-03-29 RX ORDER — ASPIRIN/CALCIUM CARB/MAGNESIUM 324 MG
81 TABLET ORAL DAILY
Qty: 0 | Refills: 0 | Status: DISCONTINUED | OUTPATIENT
Start: 2018-03-29 | End: 2018-04-06

## 2018-03-29 RX ORDER — IPRATROPIUM/ALBUTEROL SULFATE 18-103MCG
3 AEROSOL WITH ADAPTER (GRAM) INHALATION
Qty: 0 | Refills: 0 | COMMUNITY

## 2018-03-29 RX ORDER — DOCUSATE SODIUM 100 MG
100 CAPSULE ORAL THREE TIMES A DAY
Qty: 0 | Refills: 0 | Status: DISCONTINUED | OUTPATIENT
Start: 2018-03-29 | End: 2018-04-06

## 2018-03-29 RX ORDER — ATORVASTATIN CALCIUM 80 MG/1
1 TABLET, FILM COATED ORAL
Qty: 0 | Refills: 0 | COMMUNITY

## 2018-03-29 RX ORDER — INSULIN LISPRO 100/ML
VIAL (ML) SUBCUTANEOUS
Qty: 0 | Refills: 0 | Status: DISCONTINUED | OUTPATIENT
Start: 2018-03-29 | End: 2018-04-06

## 2018-03-29 RX ORDER — INSULIN HUMAN 100 [IU]/ML
5 INJECTION, SOLUTION SUBCUTANEOUS ONCE
Qty: 0 | Refills: 0 | Status: COMPLETED | OUTPATIENT
Start: 2018-03-29 | End: 2018-03-29

## 2018-03-29 RX ORDER — SODIUM CHLORIDE 9 MG/ML
1000 INJECTION INTRAMUSCULAR; INTRAVENOUS; SUBCUTANEOUS
Qty: 0 | Refills: 0 | Status: DISCONTINUED | OUTPATIENT
Start: 2018-03-29 | End: 2018-04-01

## 2018-03-29 RX ORDER — DEXAMETHASONE 0.5 MG/5ML
4 ELIXIR ORAL ONCE
Qty: 0 | Refills: 0 | Status: COMPLETED | OUTPATIENT
Start: 2018-03-29 | End: 2018-03-29

## 2018-03-29 RX ORDER — INSULIN GLARGINE 100 [IU]/ML
10 INJECTION, SOLUTION SUBCUTANEOUS EVERY MORNING
Qty: 0 | Refills: 0 | Status: DISCONTINUED | OUTPATIENT
Start: 2018-03-30 | End: 2018-04-01

## 2018-03-29 RX ORDER — HEPARIN SODIUM 5000 [USP'U]/ML
5000 INJECTION INTRAVENOUS; SUBCUTANEOUS EVERY 8 HOURS
Qty: 0 | Refills: 0 | Status: DISCONTINUED | OUTPATIENT
Start: 2018-03-29 | End: 2018-03-29

## 2018-03-29 RX ORDER — DEXTROSE 50 % IN WATER 50 %
12.5 SYRINGE (ML) INTRAVENOUS ONCE
Qty: 0 | Refills: 0 | Status: DISCONTINUED | OUTPATIENT
Start: 2018-03-29 | End: 2018-04-06

## 2018-03-29 RX ADMIN — Medication 5: at 18:25

## 2018-03-29 RX ADMIN — INSULIN HUMAN 5 UNIT(S): 100 INJECTION, SOLUTION SUBCUTANEOUS at 16:28

## 2018-03-29 RX ADMIN — INSULIN GLARGINE 10 UNIT(S): 100 INJECTION, SOLUTION SUBCUTANEOUS at 16:28

## 2018-03-29 RX ADMIN — SODIUM CHLORIDE 1000 MILLILITER(S): 9 INJECTION INTRAMUSCULAR; INTRAVENOUS; SUBCUTANEOUS at 11:10

## 2018-03-29 RX ADMIN — SODIUM CHLORIDE 50 MILLILITER(S): 9 INJECTION INTRAMUSCULAR; INTRAVENOUS; SUBCUTANEOUS at 21:33

## 2018-03-29 RX ADMIN — Medication 4 MILLIGRAM(S): at 14:33

## 2018-03-29 RX ADMIN — Medication 4 MILLIGRAM(S): at 21:41

## 2018-03-29 RX ADMIN — Medication 100 MILLIGRAM(S): at 21:41

## 2018-03-29 NOTE — H&P ADULT - NSHPPHYSICALEXAM_GEN_ALL_CORE
Vital Signs Last 24 Hrs  T(C): 36.7 (29 Mar 2018 11:57), Max: 36.7 (29 Mar 2018 11:57)  T(F): 98.1 (29 Mar 2018 11:57), Max: 98.1 (29 Mar 2018 11:57)  HR: 72 (29 Mar 2018 12:45) (72 - 85)  BP: 108/63 (29 Mar 2018 12:45) (100/43 - 108/63)  BP(mean): --  RR: 18 (29 Mar 2018 12:45) (18 - 18)  SpO2: 100% (29 Mar 2018 12:45) (100% - 100%)    General: WN/WD, dry appearance, lethargic but able to follows command  Head- Atraumatic, normocephalic   Neurology:  alert, follows command  HEENT- PERRLA, dry muscous membrane  Neck-supple, no JVD  Respiratory: Air entry equal b/l, CTA   CVS:  S1S2, no murmurs, rubs or gallops  Abdominal: Soft, NT, ND +BS,   Genitourinary- voiding, non palpable bladder  Extremities: No edema, + peripheral pulses  Skin- no rash, no ulcer  Psychiatric- mood stable   LN- no lymphadenopathy

## 2018-03-29 NOTE — PHYSICAL THERAPY INITIAL EVALUATION ADULT - GAIT TRAINING, PT EVAL
Pt. will ambulate at least 20 feet w/ hemicane/quad cane in 3 weeks w/ min A x1 to improve mobilization in household.

## 2018-03-29 NOTE — H&P ADULT - HISTORY OF PRESENT ILLNESS
84 y/o female who was undergoing cyber knife treatment for her metastatic lung cancer which she finished on Monday associated with lethargy which was gradually getting worse, not eating and drinking properly. She went to pmd for follow up and evaluation who found pt to have very high blood sugar and sent here for evaluation   -no fever at home

## 2018-03-29 NOTE — ED PROVIDER NOTE - OBJECTIVE STATEMENT
82 y/o female with PMHx of Malignant neoplasm of lung stage 4 with metastasis to the brain and pancreas tx with CyberKnife therapy, CHF, DM (no medications), COPD presents to the ED for evaluation of hyperglycemia. Pt went to a routine check up with her PCP Dr Grayson Lugo yesterday who ordered labs and told pt today that her blood glucose levels were too high. Pt  was recently in hospital on 03/16 s/p fall resulting in hip fx. Pt currently on steroids, 4mg every 6 hours, however she is currently being weened off. PCP-Dr. Lugo 84 y/o female with PMHx of Malignant neoplasm of lung stage 4 with metastasis to the brain and pancreas tx with CyberKnife therapy, CHF, DM (no medications), COPD presents to the ED for evaluation of hyperglycemia. Pt went to a routine check up with her PCP Dr Grayson Lugo yesterday who ordered labs and told pt today that her blood glucose levels were too high. Pt  was recently in hospital on 03/16 s/p fall resulting in hip fx. Pt currently on steroids, 4mg every 6 hours, however she is currently being weened off. PCP-Dr. Lugo. 82 y/o female with PMHx of Malignant neoplasm of lung stage 4 with metastasis to the brain with CyberKnife therapy, CHF, DM (no medications), COPD presents to the ED for evaluation of hyperglycemia. Pt went to a routine check up with her PCP Dr Grayson Lugo yesterday who ordered labs and told pt today that her blood glucose levels were too high. Pt  was recently in hospital on 03/16 s/p fall resulting in pelvic fx. Pt currently on steroids, 4mg every 6 hours, however she is currently being weaned off. PCP-Dr. Lugo.

## 2018-03-29 NOTE — PHYSICAL THERAPY INITIAL EVALUATION ADULT - LEVEL OF INDEPENDENCE: SUPINE/SIT, REHAB EVAL
Pt. refused despite education of benefits due to being too tired. Pt. falling asleep during evaluation.

## 2018-03-29 NOTE — ED PROVIDER NOTE - SKIN, MLM
Skin normal color for race, warm, dry and intact. No evidence of rash. Skin normal color for race, warm, dry and intact. No evidence of rash. +Old ecchymosis right proximal humerus

## 2018-03-29 NOTE — PHYSICAL THERAPY INITIAL EVALUATION ADULT - TRANSFER TRAINING, PT EVAL
Pt. will perform sit<-->stand and bed<-->chair transfers w/ min A x1 w/ hemicane in 2 weeks to improve functional transfers.

## 2018-03-29 NOTE — ED ADULT TRIAGE NOTE - NS ED TRIAGE HISTORIAN
Taran called and wanted to schedule Aretha' right total knee replacement.  States she is having a very difficult time ambulating and she needs surgery. I explained she completed [facility].  He informed me she did not participate in Prehab and I explained I will need to schedule Prehab, Outpatient PT and she will need another Pre Op Physical and we could discuss the discharge plan again.  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  She will need a 2 wheel walker.  I explained I would inform Dr Alas's Office and they will call or inform me of surgery dates and I would call him back with information.         Patient/EMS

## 2018-03-29 NOTE — PHYSICAL THERAPY INITIAL EVALUATION ADULT - ADDITIONAL COMMENTS
History taken from daughter as pt. is too lethargic. Pt.'s daughter states pt. was ambulatory w/ a quad cane prior to fall 3 weeks ago where pt. broke L humerus. Since then, pt. has been primarily bedbound, performing bed to wheelchair transfers w/ [facility] and to go to doctors office. Pt. is on 4L O2 24/7.

## 2018-03-29 NOTE — H&P ADULT - ASSESSMENT
#lethargy, not eating well and generalized functional decline  -most likely due to hyperglycemia induced by mostly steroids   -admit, iv fluids, supportive care, insuline, PT, blood culture  -hold off on abx, no evidence of infection     #Mets to brain- ct tapering dose of steroids, daughter her regimen of tapering steroids, once available will put her on tapering regimen     #Thrombocytopenia- monitor it, hematology evaluation prn     #h/o heart failure-compensated right now.     #HTN- running low bp. hold off on bp meds    #Leucocytosis- most likely steroids induced, hold off on abx just panculture     #hyponatremia- appears combination of dehydration and pseudo   -iv fluids gentle and monitoring her     #h/o pelvic fracture- pain control, Pt     #dvt pr- scd in view of thrombocytopenia     #Above discussed with pt and daughter at bedside, all questions have been answered

## 2018-03-29 NOTE — H&P ADULT - NSHPLABSRESULTS_GEN_ALL_CORE
11.0   14.39 )-----------( 84       ( 29 Mar 2018 13:13 )             34.3     03-29    129<L>  |  95<L>  |  34<H>  ----------------------------<  343<H>  4.7   |  26  |  0.65    Ca    7.9<L>      29 Mar 2018 13:13            Urinalysis Basic - ( 29 Mar 2018 11:45 )    Color: Yellow / Appearance: Clear / S.020 / pH: x  Gluc: x / Ketone: Negative  / Bili: Negative / Urobili: 1 mg/dL   Blood: x / Protein: 15 mg/dL / Nitrite: Negative   Leuk Esterase: Negative / RBC: 11-25 /HPF / WBC 0-2   Sq Epi: x / Non Sq Epi: Few / Bacteria: Few          CAPILLARY BLOOD GLUCOSE      POCT Blood Glucose.: 340 mg/dL (29 Mar 2018 12:56)

## 2018-03-29 NOTE — H&P ADULT - NSCORESITESY/N_GEN_A_CORE_RD
This patient only qualifies for a home sleep study. Can you kindly enter an order for this please?   Yes

## 2018-03-30 LAB
ADD ON TEST-SPECIMEN IN LAB: SIGNIFICANT CHANGE UP
ALBUMIN SERPL ELPH-MCNC: 2.5 G/DL — LOW (ref 3.3–5)
ANION GAP SERPL CALC-SCNC: 9 MMOL/L — SIGNIFICANT CHANGE UP (ref 5–17)
BUN SERPL-MCNC: 26 MG/DL — HIGH (ref 7–23)
CALCIUM SERPL-MCNC: 7.8 MG/DL — LOW (ref 8.5–10.1)
CHLORIDE SERPL-SCNC: 96 MMOL/L — SIGNIFICANT CHANGE UP (ref 96–108)
CO2 SERPL-SCNC: 25 MMOL/L — SIGNIFICANT CHANGE UP (ref 22–31)
CREAT SERPL-MCNC: 0.4 MG/DL — LOW (ref 0.5–1.3)
CULTURE RESULTS: SIGNIFICANT CHANGE UP
GLUCOSE BLDC GLUCOMTR-MCNC: 203 MG/DL — HIGH (ref 70–99)
GLUCOSE BLDC GLUCOMTR-MCNC: 218 MG/DL — HIGH (ref 70–99)
GLUCOSE BLDC GLUCOMTR-MCNC: 308 MG/DL — HIGH (ref 70–99)
GLUCOSE SERPL-MCNC: 188 MG/DL — HIGH (ref 70–99)
HBA1C BLD-MCNC: 8.8 % — HIGH (ref 4–5.6)
HCT VFR BLD CALC: 31.6 % — LOW (ref 34.5–45)
HGB BLD-MCNC: 10.4 G/DL — LOW (ref 11.5–15.5)
MCHC RBC-ENTMCNC: 28.3 PG — SIGNIFICANT CHANGE UP (ref 27–34)
MCHC RBC-ENTMCNC: 32.9 GM/DL — SIGNIFICANT CHANGE UP (ref 32–36)
MCV RBC AUTO: 86.1 FL — SIGNIFICANT CHANGE UP (ref 80–100)
NRBC # BLD: 0 /100 WBCS — SIGNIFICANT CHANGE UP (ref 0–0)
PLATELET # BLD AUTO: 75 K/UL — LOW (ref 150–400)
POTASSIUM SERPL-MCNC: 4.1 MMOL/L — SIGNIFICANT CHANGE UP (ref 3.5–5.3)
POTASSIUM SERPL-SCNC: 4.1 MMOL/L — SIGNIFICANT CHANGE UP (ref 3.5–5.3)
RBC # BLD: 3.67 M/UL — LOW (ref 3.8–5.2)
RBC # FLD: 18.6 % — HIGH (ref 10.3–14.5)
SODIUM SERPL-SCNC: 130 MMOL/L — LOW (ref 135–145)
SPECIMEN SOURCE: SIGNIFICANT CHANGE UP
WBC # BLD: 11.16 K/UL — HIGH (ref 3.8–10.5)
WBC # FLD AUTO: 11.16 K/UL — HIGH (ref 3.8–10.5)

## 2018-03-30 PROCEDURE — 72190 X-RAY EXAM OF PELVIS: CPT | Mod: 26

## 2018-03-30 PROCEDURE — 73030 X-RAY EXAM OF SHOULDER: CPT | Mod: 26,RT

## 2018-03-30 RX ORDER — HEPARIN SODIUM 5000 [USP'U]/ML
5000 INJECTION INTRAVENOUS; SUBCUTANEOUS EVERY 12 HOURS
Qty: 0 | Refills: 0 | Status: DISCONTINUED | OUTPATIENT
Start: 2018-03-30 | End: 2018-03-31

## 2018-03-30 RX ORDER — INSULIN LISPRO 100/ML
4 VIAL (ML) SUBCUTANEOUS ONCE
Qty: 0 | Refills: 0 | Status: COMPLETED | OUTPATIENT
Start: 2018-03-30 | End: 2018-03-30

## 2018-03-30 RX ADMIN — Medication 4 MILLIGRAM(S): at 15:05

## 2018-03-30 RX ADMIN — Medication 100 MILLIGRAM(S): at 04:57

## 2018-03-30 RX ADMIN — Medication 4 MILLIGRAM(S): at 04:58

## 2018-03-30 RX ADMIN — Medication 4 UNIT(S): at 21:44

## 2018-03-30 RX ADMIN — Medication 81 MILLIGRAM(S): at 11:47

## 2018-03-30 RX ADMIN — HEPARIN SODIUM 5000 UNIT(S): 5000 INJECTION INTRAVENOUS; SUBCUTANEOUS at 17:21

## 2018-03-30 RX ADMIN — Medication 4 MILLIGRAM(S): at 21:43

## 2018-03-30 RX ADMIN — Medication 100 MILLIGRAM(S): at 21:43

## 2018-03-30 RX ADMIN — Medication 100 MILLIGRAM(S): at 15:05

## 2018-03-30 RX ADMIN — Medication 2: at 11:46

## 2018-03-30 RX ADMIN — Medication 2: at 17:22

## 2018-03-30 NOTE — GOALS OF CARE CONVERSATION - PERSONAL ADVANCE DIRECTIVE - TREATMENT GUIDELINE COMMENT
MOLST- DNR, limited medical interventions, DNI, send to hospital, no feeding tube, trial of IVF, determine use of antibiotics, ok for transfusions, no dialysis, no icu, no pressors

## 2018-03-30 NOTE — PROGRESS NOTE ADULT - SUBJECTIVE AND OBJECTIVE BOX
Patient is a 83y old  Female who presents with a chief complaint of hyperglycemia (29 Mar 2018 21:29)    HPI:  82 y/o female who was completed  Cyberknife radiosurgery to her 2 brain mets 3/27/18  for her metastatic lung cancer which she finished on Monday associated with lethargy which was gradually getting worse, not eating and drinking properly. She went to pmd for follow up and evaluation who found pt to have very high blood sugar and sent here for evaluation   -no fever at home (29 Mar 2018 15:30). Pt lethargic.      PAST MEDICAL & SURGICAL HISTORY:  Pancreatic cancer  Mitral regurgitation  Chronic systolic congestive heart failure  Malignant neoplasm of lung, unspecified laterality, unspecified part of lung: Stage 4  Chronic obstructive pulmonary disease, unspecified COPD type  Diabetes mellitus: Type 2  No significant past surgical history      MEDICATIONS  (STANDING):  aspirin enteric coated 81 milliGRAM(s) Oral daily  dexamethasone     Tablet 4 milliGRAM(s) Oral three times a day  dextrose 5%. 1000 milliLiter(s) (50 mL/Hr) IV Continuous <Continuous>  dextrose 50% Injectable 12.5 Gram(s) IV Push once  dextrose 50% Injectable 25 Gram(s) IV Push once  dextrose 50% Injectable 25 Gram(s) IV Push once  docusate sodium 100 milliGRAM(s) Oral three times a day  insulin glargine Injectable (LANTUS) 10 Unit(s) SubCutaneous every morning  insulin lispro (HumaLOG) corrective regimen sliding scale   SubCutaneous three times a day before meals  sodium chloride 0.9%. 1000 milliLiter(s) (50 mL/Hr) IV Continuous <Continuous>    MEDICATIONS  (PRN):  dextrose Gel 1 Dose(s) Oral once PRN Blood Glucose LESS THAN 70 milliGRAM(s)/deciliter  glucagon  Injectable 1 milliGRAM(s) IntraMuscular once PRN Glucose LESS THAN 70 milligrams/deciliter  oxyCODONE    5 mG/acetaminophen 325 mG 1 Tablet(s) Oral every 6 hours PRN Moderate Pain (4 - 6)      PHYSICAL EXAM:  Vital Signs Last 24 Hrs  T(C): 36.4 (30 Mar 2018 04:33), Max: 36.8 (29 Mar 2018 20:09)  T(F): 97.5 (30 Mar 2018 04:33), Max: 98.2 (29 Mar 2018 20:09)  HR: 74 (30 Mar 2018 04:33) (72 - 85)  BP: 114/47 (30 Mar 2018 04:33) (98/49 - 120/100)  BP(mean): --  RR: 18 (30 Mar 2018 04:33) (16 - 18)  SpO2: 98% (30 Mar 2018 04:33) (96% - 100%)  Head: no alopecia or scars  Neck: no palpable lymphadenopathy  Lungs: Clear to ascultation bilaterally and no wheezes  Cardiac: normal S1, S2, no murmurs  Abdomen: soft, nontender with no ascites  Extremities: no peripheral edema, good pulses bilaterally  Neuro: A & O, CNs II-XII intact. Motor strength 5/5 throughout and strength 5/5 throughout. Ambulatory    LABS:  CBC Full  -  ( 30 Mar 2018 06:33 )  WBC Count : 11.16 K/uL  Hemoglobin : 10.4 g/dL  Hematocrit : 31.6 %  Platelet Count - Automated : 75 K/uL  Mean Cell Volume : 86.1 fl  Mean Cell Hemoglobin : 28.3 pg  Mean Cell Hemoglobin Concentration : 32.9 gm/dL  Auto Neutrophil # : x  Auto Lymphocyte # : x  Auto Monocyte # : x  Auto Eosinophil # : x  Auto Basophil # : x  Auto Neutrophil % : x  Auto Lymphocyte % : x  Auto Monocyte % : x  Auto Eosinophil % : x  Auto Basophil % : x    03-30    130<L>  |  96  |  26<H>  ----------------------------<  188<H>  4.1   |  25  |  0.40<L>    Ca    7.8<L>      30 Mar 2018 06:33

## 2018-03-30 NOTE — GOALS OF CARE CONVERSATION - PERSONAL ADVANCE DIRECTIVE - CONVERSATION DETAILS
Met with daughter to see how pt has faired since last admission. She notes pt was able to go home with some aid support. She seemed to have same energy she left here with, which was not as lethargic as she is now, but less open to activity or watching her favorite shows as usual. She did well with cyberknife treatments, able to tolerate them without issue. He po intake has waned somewhat but not drastically different from dc either. She notes she has only been somewhat confused yesterday, but otherwise was clear.     She understands that pt was sent here for lethargy and elevated BG. She understands also that wbc was slightly elevated, but we have no sign of infection otherwise. Explained that lethargy could be due to BG (which likely is linked to steroids), but could also be decline in setting of cancer or radiation treatment. She explained that pt is a fighter and all other indices for cancer have been good, with negative PET recently and toleration of cyberknife. She knows that pt has been weak and was supposed to go to rehab from community, unable to arrange this. She would like to give pt a chance to improve with stabilization  of metabolic issues here, PT while here and hopefully PT if cooperative enough to do so. Explained that team is hopeful for the same. We agreed to give pt the weekend, speaking to PT earlier who will have therapist come one day over the weekend as well. Daughter understands that if pt does not improve or cannot participate enough to justify rehab that we will have to make a new plan to suits her needs.     We also discussed advanced directives, confirming pt's previously stated wishes to be DNR and DNI, and kept MOLST decisions the same as well.     Plan is to see how pt fairs over the weekend with PT reeval, and touch base on Monday to see what plan best suits pt's needs.

## 2018-03-30 NOTE — PROGRESS NOTE ADULT - SUBJECTIVE AND OBJECTIVE BOX
84 y/o female who was completed  Cyberknife radiosurgery to her 2 brain mets 3/27/18  for her metastatic lung cancer which she finished on Monday associated with lethargy which was gradually getting worse, not eating and drinking properly. She went to pmd for follow up and evaluation who found pt to have very high blood sugar and sent here for evaluation     3/30:  lethargic, wakes up no c/o at this time      PHYSICAL EXAM:  Vital Signs Last 24 Hrs  T(C): 36.4 (30 Mar 2018 04:33), Max: 36.8 (29 Mar 2018 20:09)  T(F): 97.5 (30 Mar 2018 04:33), Max: 98.2 (29 Mar 2018 20:09)  HR: 74 (30 Mar 2018 04:33) (72 - 85)  BP: 114/47 (30 Mar 2018 04:33) (98/49 - 120/100)  BP(mean): --  RR: 18 (30 Mar 2018 04:33) (16 - 18)  SpO2: 98% (30 Mar 2018 04:33) (96% - 100%)  Head: no alopecia or scars  Neck: no palpable lymphadenopathy  Lungs: Clear to ascultation bilaterally and no wheezes  Cardiac: normal S1, S2, no murmurs  Abdomen: soft, nontender with no ascites  Extremities: no peripheral edema, good pulses bilaterally; right arm sling        TOXIC METAB ENCEPHALOPATHY  METASTATIC LUNG CA  T2DM    - c/w present rx  - check blood cx r/o bacteremia as a cause for her MS  - RXT suggested to consult palliative  - c/w sliding scale and CORTES; pt is eating

## 2018-03-30 NOTE — GOALS OF CARE CONVERSATION - PERSONAL ADVANCE DIRECTIVE - WHAT MATTERS MOST
Having chance to improve. Daughter shared that pt has been a fighter and just does not want the teams to "give up on her yet." Explained that no one wants to give up on her either, but we do want to be honest about our impressions and help her make the best plan for her mom and her mom's QOL, which pt was clear on last admission is most important to her.

## 2018-03-30 NOTE — CONSULT NOTE ADULT - SUBJECTIVE AND OBJECTIVE BOX
HPI: Ms. Tavares an 83y old Female coming from home with hx of COPD on home O2 (4L), CHF (EF 20-25%), DM2 and metastatic Lung Cancer (Stage IV) dx 9/16 tx with carboplatin w/ excellent response and prophylactic whole brain xrt. This was followed by progression of dz in chest in Aug 2017 tx w/ carbo/vp16 with excellent response as well and recent PET in Feb 2018 w/ no progression. Pt also recently admitted 3/6 for mechanical fall at home w/ CXR showing nondisplaced R humoral fracture, CT pelvis showing nondisplaced fracture of right inferior and superior rami w/ associated hematoma of R internal obturator muscle, and lastly CTH showing L centrum seminovale/radiata mass and R frontal lesion with some vasogenic edema representing brain mets. No surgical intervention needed for fractures. Now s/p cyberknife w/ Dr. Osorio for mets, finished tx on 3/26, with worsening lethargy since. Pt now admitted on 3/29 for BG>500 with PMD. Found here to have mildly elevated WBC (improved from 14-->11), CXR normal. thrombocytopenia shoulder xray still showing nondisplaced humeral head fracture, and normal ABG, with no sign of infection or other cause for lethargy.  Palliative care consulted to assist with establishing GOC, pt known to our service from last admission.    Met with Ms. Tavares this am, daughter/HCP Ashia at bedside. Pt tired, but easily arouseable and able to answer questions. She was able to orient to place, but not time and not circumstance (thinking she was here for fractures like last time). She denied pain, noted mild dyspnea. Other than fatigue denied any discomfort. She endorsed hunger when asked.     Spoke separately with daughter for more information. See GOC note for further details.     PAIN: ( )Yes   (x )No- none currently, daughter notes this only happens in hip when sitting in chair on occasion. Only used percocet 1x since discharge.  DYSPNEA: (x ) Yes  ( ) No  Level: mild, says she has this at baseline    PAST MEDICAL & SURGICAL HISTORY:  Pancreatic cancer  Mitral regurgitation  Chronic systolic congestive heart failure  Malignant neoplasm of lung, unspecified laterality, unspecified part of lung: Stage 4  Chronic obstructive pulmonary disease, unspecified COPD type  Diabetes mellitus: Type 2  No significant past surgical history      SOCIAL HX: Lives alone, but now with aids to help daughter (who lives in [place], but came to care for mom since last admission)    Hx opiate tolerance (x )YES  ( )NO    Baseline ADLs  (Prior to Admission)- needs assistance with all ADLs recently  ( ) Independent   ( x)Dependent    FAMILY HISTORY:  No pertinent family history in first degree relatives      Review of Systems:    Anxiety- denies  Depression- denies  Constipation- denies, resolved in community with colace and benefiber  Diarrhea- denies  Nausea-denies  Vomiting- denies  Anorexia- yes, but hungry today  Weight Loss- unsure  Cough- occasionally  Secretions- denies  Fatigue- yes, mod-sev  Weakness- yes, mod  Delirium- at times    All other systems reviewed and negative      PHYSICAL EXAM:    Vital Signs Last 24 Hrs  T(C): 36.4 (30 Mar 2018 04:33), Max: 36.8 (29 Mar 2018 20:09)  T(F): 97.5 (30 Mar 2018 04:33), Max: 98.2 (29 Mar 2018 20:09)  HR: 74 (30 Mar 2018 04:33) (74 - 85)  BP: 114/47 (30 Mar 2018 04:33) (98/49 - 120/100)  BP(mean): --  RR: 18 (30 Mar 2018 04:33) (16 - 18)  SpO2: 98% (30 Mar 2018 04:33) (96% - 98%)  Daily     Daily     PPSV2: 30  %    General: Elderly female, tired, ill-appearing, lying on side, initially asleep, but wakes easily to voice, pleasant, [place] (normally has hearing aid in right ear) NAD  Mental Status: AOx2 (not time or circumstance)  HEENT:  perrl, eomi, +temporal wasting  Lungs: clear  Cardiac: +s1 s2 rrr  GI: soft nt nd +bs  : voids  Ext: R arm in sling, no edema, rom without pain in LE  Neuro: weakness in LE due to poor effort 4/5 bl LE, otherwiseno focal deficits    LABS:                        10.4   11.16 )-----------( 75       ( 30 Mar 2018 06:33 )             31.6     03-30    130<L>  |  96  |  26<H>  ----------------------------<  188<H>  4.1   |  25  |  0.40<L>    Ca    7.8<L>      30 Mar 2018 06:33      Albumin:     Allergies    No Known Allergies    Intolerances      MEDICATIONS  (STANDING):  aspirin enteric coated 81 milliGRAM(s) Oral daily  dexamethasone     Tablet 4 milliGRAM(s) Oral three times a day  dextrose 5%. 1000 milliLiter(s) (50 mL/Hr) IV Continuous <Continuous>  dextrose 50% Injectable 12.5 Gram(s) IV Push once  dextrose 50% Injectable 25 Gram(s) IV Push once  dextrose 50% Injectable 25 Gram(s) IV Push once  docusate sodium 100 milliGRAM(s) Oral three times a day  heparin  Injectable 5000 Unit(s) SubCutaneous every 12 hours  insulin glargine Injectable (LANTUS) 10 Unit(s) SubCutaneous every morning  insulin lispro (HumaLOG) corrective regimen sliding scale   SubCutaneous three times a day before meals  sodium chloride 0.9%. 1000 milliLiter(s) (50 mL/Hr) IV Continuous <Continuous>    MEDICATIONS  (PRN):  dextrose Gel 1 Dose(s) Oral once PRN Blood Glucose LESS THAN 70 milliGRAM(s)/deciliter  glucagon  Injectable 1 milliGRAM(s) IntraMuscular once PRN Glucose LESS THAN 70 milligrams/deciliter  oxyCODONE    5 mG/acetaminophen 325 mG 1 Tablet(s) Oral every 6 hours PRN Moderate Pain (4 - 6)      RADIOLOGY/ADDITIONAL STUDIES:    EXAM:  SHOULDER COMP  MIN 2 VIEWS-RT                        PROCEDURE DATE:  03/30/2018      IMPRESSION:   Nondisplaced humeral head fracture.            JAVIER SANTIAGO M.D., ATTENDING RADIOLOGIST  This document has been electronically signed. Mar 30 2018 10:39AM      EXAM:  XR CHEST AP OR PA 1V                        PROCEDURE DATE:  03/29/2018      Impression: No active infiltrates. Stable exam      JERICHO DING   This document has been electronically signed. Mar 29 2018  2:59PM

## 2018-03-30 NOTE — CONSULT NOTE ADULT - ASSESSMENT
83y old Female coming from home with hx of COPD on home O2 (4L), CHF (EF 20-25%), DM2 and metastatic Lung Cancer (Stage IV) dx 9/16 tx with carboplatin w/ excellent response and prophylactic whole brain xrt. This was followed by progression of dz in chest in Aug 2017 tx w/ carbo/vp16 with excellent response as well and recent PET in Feb 2018 w/ no progression. Pt also recently admitted 3/6 for mechanical fall at home w/ CXR showing nondisplaced R humoral fracture, CT pelvis showing nondisplaced fracture of right inferior and superior rami w/ associated hematoma of R internal obturator muscle, and lastly CTH showing L centrum seminovale/radiata mass and R frontal lesion with some vasogenic edema representing brain mets. No surgical intervention needed for fractures. Now s/p cyberknife w/ Dr. Osorio for mets, finished tx on 3/26, with worsening lethargy since. Pt now admitted on 3/29 for BG>500 with PMD. Found here to have mildly elevated WBC (improved from 14-->11), CXR normal. thrombocytopenia shoulder xray still showing nondisplaced humeral head fracture, and normal ABG, with no sign of infection or other cause for lethargy.  Palliative care consulted to assist with establishing GOC, pt known to our service from last admission.    1) AMS/lethargy  - BG>500 initially, now improved  - still with lethargy now  - no other causes found, i.e. no sign of infection etc.  - may also be 2/2 to radiation treatment  - need to give some time to see how pt improves  - PT evaluated pt, plan to return over the weekend, agree with this and TRACY if pt is able to cooperate    2) Pain  - denies today, but noted earlier while sitting in chair  - 2/2 to acute fractures  - on percocet at home, without needing to use this since admission  - add Tylenol prn mild-mod pain  - c/w percocet prn severe pain  - also c/w steroids as they are likely helping both intracranial process and bone pain  - As per Dr. Osorio important to dc on same steroid dose and allow Dr. Osorio to taper along with treatment.  - will continue to monitor    3) Fractures  - ortho notes appreciated   - right arm in sling and WBAT w/ assist and rolling walker  - no surgical intervention otherwise recommended  - PT here and ortho follow up outpt    4) Thrombocytopenia  - no active bleed thus far  - c/w monitoring    5) Stage IV lung ca with mets to brain  - Pt did very well with regimens in the past and currently tolerated cyberknife  - [facility] onc notes appreciated  - c/w steroid taper  - recent PET also negative for further progression  - as per daughter, last conversation with Dr. 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 Prognosis will depend on how/if she improves with functional status    7) GOC/Advanced Directives  - pt has waxing/waning capacity for decision making  - HCP completed on previous admission- 1)daughter Ashia Vega [phone], 2) Niece Lamar Welsh [phone]  - DNR and DNI, limited MOLST also completed on last admission, reviewed and kept the same today  - GOC meeting held today- daughter would like to give pt a chance to improve, with PT while here and hopefully TRACY after. She is "not ready to throw in the towel." However, she is also understanding that we will need to have further conversation about prognosis if she does not improve in next few days, plan to touch base again on Monday.     Thank you for including us in Ms. Tavares's care. Will continue to follow    Chris Fairbanks MD  Palliative Care Attending

## 2018-03-30 NOTE — CONSULT NOTE ADULT - SUBJECTIVE AND OBJECTIVE BOX
83y Female RHD admitted to medical service for lethargy who also has a 3.5 week old Right Proximal humerus fx. SHe has minimal to no pain and is currently in a sling. She was last seen by ortho on 3/7 for this injury. Pt seen today for Dr. Ramírez as she is known to him. Denies numbness/tingling. Denies fever/chills. Denies pain/injury elsewhere. No other complaints.    HEALTH ISSUES - PROBLEM Dx:        MEDICATIONS  (STANDING):  aspirin enteric coated 81 milliGRAM(s) Oral daily  dexamethasone     Tablet 4 milliGRAM(s) Oral three times a day  dextrose 5%. 1000 milliLiter(s) IV Continuous <Continuous>  dextrose 50% Injectable 12.5 Gram(s) IV Push once  dextrose 50% Injectable 25 Gram(s) IV Push once  dextrose 50% Injectable 25 Gram(s) IV Push once  docusate sodium 100 milliGRAM(s) Oral three times a day  heparin  Injectable 5000 Unit(s) SubCutaneous every 12 hours  insulin glargine Injectable (LANTUS) 10 Unit(s) SubCutaneous every morning  insulin lispro (HumaLOG) corrective regimen sliding scale   SubCutaneous three times a day before meals  sodium chloride 0.9%. 1000 milliLiter(s) IV Continuous <Continuous>    Allergies    No Known Allergies    Intolerances                            10.4   11.16 )-----------( 75       ( 30 Mar 2018 06:33 )             31.6     30 Mar 2018 06:33    130    |  96     |  26     ----------------------------<  188    4.1     |  25     |  0.40     Ca    7.8        30 Mar 2018 06:33        Vital Signs Last 24 Hrs  T(C): 36.4 (03-30-18 @ 04:33), Max: 36.8 (03-29-18 @ 20:09)  T(F): 97.5 (03-30-18 @ 04:33), Max: 98.2 (03-29-18 @ 20:09)  HR: 74 (03-30-18 @ 04:33) (72 - 85)  BP: 114/47 (03-30-18 @ 04:33) (98/49 - 120/100)  BP(mean): --  RR: 18 (03-30-18 @ 04:33) (16 - 18)  SpO2: 98% (03-30-18 @ 04:33) (96% - 100%)    Imaging: XR demonstrates Right nondisplaced proximal humerus fracture    Physical Exam  Gen: NAD, calm, Follows Commands  R UE: Sling in place. Skin intact, no swelling, has residual ecchymosis to shoulder noted. Cannot AROM shoulder due to pain. r/u/m/ain/pin function intact. SILT over deltoid. SILT digits 1-5, warm to touch. 2+ radial pulse. Compartments soft and compressible.     A/P: 83y Female with subacute Right proximal humerus fracture  Pain control  NWB R/L UE   Sling   Active movement of fingers encouraged  Follow up with Dr. Ramírez as outpatient within 1 week, call office for appointment   Ortho stable  for DC  Discussed with Dr. Ramírez today 83y Female RHD admitted to medical service for lethargy who also has a 3.5 week old Right Proximal humerus fx. SHe has minimal to no pain and is currently in a sling. She was last seen by ortho on 3/7 for this injury. Pt seen today for Dr. Ramírez as she is known to him. Denies numbness/tingling. Denies fever/chills. Denies pain/injury elsewhere. No other complaints. She also has a known Right superior pubic ramus fx from about 3 weeks ago from the same fall.     HEALTH ISSUES - PROBLEM Dx:        MEDICATIONS  (STANDING):  aspirin enteric coated 81 milliGRAM(s) Oral daily  dexamethasone     Tablet 4 milliGRAM(s) Oral three times a day  dextrose 5%. 1000 milliLiter(s) IV Continuous <Continuous>  dextrose 50% Injectable 12.5 Gram(s) IV Push once  dextrose 50% Injectable 25 Gram(s) IV Push once  dextrose 50% Injectable 25 Gram(s) IV Push once  docusate sodium 100 milliGRAM(s) Oral three times a day  heparin  Injectable 5000 Unit(s) SubCutaneous every 12 hours  insulin glargine Injectable (LANTUS) 10 Unit(s) SubCutaneous every morning  insulin lispro (HumaLOG) corrective regimen sliding scale   SubCutaneous three times a day before meals  sodium chloride 0.9%. 1000 milliLiter(s) IV Continuous <Continuous>    Allergies    No Known Allergies    Intolerances                            10.4   11.16 )-----------( 75       ( 30 Mar 2018 06:33 )             31.6     30 Mar 2018 06:33    130    |  96     |  26     ----------------------------<  188    4.1     |  25     |  0.40     Ca    7.8        30 Mar 2018 06:33        Vital Signs Last 24 Hrs  T(C): 36.4 (03-30-18 @ 04:33), Max: 36.8 (03-29-18 @ 20:09)  T(F): 97.5 (03-30-18 @ 04:33), Max: 98.2 (03-29-18 @ 20:09)  HR: 74 (03-30-18 @ 04:33) (72 - 85)  BP: 114/47 (03-30-18 @ 04:33) (98/49 - 120/100)  BP(mean): --  RR: 18 (03-30-18 @ 04:33) (16 - 18)  SpO2: 98% (03-30-18 @ 04:33) (96% - 100%)    Imaging: XR demonstrates Right nondisplaced proximal humerus fracture    Physical Exam  Gen: NAD, calm, Follows Commands  R UE: Sling in place. Skin intact, no swelling, has residual ecchymosis to shoulder noted. Cannot AROM shoulder due to pain. r/u/m/ain/pin function intact. SILT over deltoid. SILT digits 1-5, warm to touch. 2+ radial pulse. Compartments soft and compressible.   Right LE: Able to wiggle toes and move leg actively. No pain on log roll or HS.    A/P: 83y Female with subacute Right proximal humerus fracture  1) Right proximal humerus fx, subacute  NWB RUE   Sling   Active movement of fingers encouraged    2) Subacute Right Superior MO fx  WBAT with assist, rolling walker, PT    No orthopedic surgical intervention for either of the above injuries.   Follow up with Dr. Ramírez as outpatient within 1 week, call office for appointment   Ortho stable  for DC  Discussed with Dr. Ramírez today

## 2018-03-31 LAB
GLUCOSE BLDC GLUCOMTR-MCNC: 158 MG/DL — HIGH (ref 70–99)
GLUCOSE BLDC GLUCOMTR-MCNC: 172 MG/DL — HIGH (ref 70–99)
GLUCOSE BLDC GLUCOMTR-MCNC: 209 MG/DL — HIGH (ref 70–99)
GLUCOSE BLDC GLUCOMTR-MCNC: 92 MG/DL — SIGNIFICANT CHANGE UP (ref 70–99)
HCT VFR BLD CALC: 31 % — LOW (ref 34.5–45)
HGB BLD-MCNC: 10.5 G/DL — LOW (ref 11.5–15.5)
MCHC RBC-ENTMCNC: 28.8 PG — SIGNIFICANT CHANGE UP (ref 27–34)
MCHC RBC-ENTMCNC: 33.9 GM/DL — SIGNIFICANT CHANGE UP (ref 32–36)
MCV RBC AUTO: 84.9 FL — SIGNIFICANT CHANGE UP (ref 80–100)
NRBC # BLD: 0 /100 WBCS — SIGNIFICANT CHANGE UP (ref 0–0)
PLATELET # BLD AUTO: 71 K/UL — LOW (ref 150–400)
RBC # BLD: 3.65 M/UL — LOW (ref 3.8–5.2)
RBC # FLD: 18.5 % — HIGH (ref 10.3–14.5)
WBC # BLD: 8.45 K/UL — SIGNIFICANT CHANGE UP (ref 3.8–10.5)
WBC # FLD AUTO: 8.45 K/UL — SIGNIFICANT CHANGE UP (ref 3.8–10.5)

## 2018-03-31 RX ORDER — ACETAMINOPHEN 500 MG
650 TABLET ORAL EVERY 6 HOURS
Qty: 0 | Refills: 0 | Status: DISCONTINUED | OUTPATIENT
Start: 2018-03-31 | End: 2018-04-06

## 2018-03-31 RX ORDER — OXYCODONE AND ACETAMINOPHEN 5; 325 MG/1; MG/1
1 TABLET ORAL EVERY 6 HOURS
Qty: 0 | Refills: 0 | Status: DISCONTINUED | OUTPATIENT
Start: 2018-03-31 | End: 2018-04-06

## 2018-03-31 RX ADMIN — Medication 100 MILLIGRAM(S): at 05:29

## 2018-03-31 RX ADMIN — Medication 2: at 09:23

## 2018-03-31 RX ADMIN — Medication 81 MILLIGRAM(S): at 13:44

## 2018-03-31 RX ADMIN — SODIUM CHLORIDE 50 MILLILITER(S): 9 INJECTION INTRAMUSCULAR; INTRAVENOUS; SUBCUTANEOUS at 13:41

## 2018-03-31 RX ADMIN — HEPARIN SODIUM 5000 UNIT(S): 5000 INJECTION INTRAVENOUS; SUBCUTANEOUS at 05:29

## 2018-03-31 RX ADMIN — Medication 100 MILLIGRAM(S): at 13:45

## 2018-03-31 RX ADMIN — Medication 4 MILLIGRAM(S): at 05:29

## 2018-03-31 RX ADMIN — INSULIN GLARGINE 10 UNIT(S): 100 INJECTION, SOLUTION SUBCUTANEOUS at 09:23

## 2018-03-31 RX ADMIN — Medication 1: at 13:42

## 2018-03-31 RX ADMIN — Medication 4 MILLIGRAM(S): at 13:44

## 2018-03-31 RX ADMIN — Medication 4 MILLIGRAM(S): at 21:02

## 2018-03-31 RX ADMIN — Medication 100 MILLIGRAM(S): at 21:02

## 2018-03-31 NOTE — PROGRESS NOTE ADULT - SUBJECTIVE AND OBJECTIVE BOX
HPI: Pt seen and examined this afternoon in follow up for sx. Pt notes being tired, acknowledges RN's impression of not feeling up to doing anything, including eating. Asking only for sips of water. Aware that she is in hospital, otherwise remains unable to orient. Denies pain or any other symptoms beside weakness and fatigue.      PAIN: denies  DYSPNEA: denies      ROS:    Fatigue- yes  Weakness- yes    Otherwise limited by confusion and fatigue      PHYSICAL EXAM:    Vital Signs Last 24 Hrs  T(C): 36.5 (31 Mar 2018 04:43), Max: 36.5 (31 Mar 2018 04:43)  T(F): 97.7 (31 Mar 2018 04:43), Max: 97.7 (31 Mar 2018 04:43)  HR: 75 (31 Mar 2018 04:43) (75 - 94)  BP: 124/47 (31 Mar 2018 04:43) (108/58 - 124/47)  BP(mean): --  RR: 16 (31 Mar 2018 04:43) (16 - 17)  SpO2: 100% (31 Mar 2018 04:43) (96% - 100%)  Daily     Daily     PPSV2: 20-30  %    General: Elderly female, tired, ill-appearing, lying on side, initially asleep, but wakes easily to voice, unable to give herself drink of water, but can sip when offered, NAD  Mental Status: AOx2 (not time or circumstance)  HEENT:  perrl, eomi, +temporal wasting  Lungs: clear  Cardiac: +s1 s2 rrr  GI: soft nt nd +bs  : voids  Ext: R arm in sling, no edema, rom without pain in LE  Neuro: weakness in LE due to poor effort 4/5 bl LE, otherwise no focal deficits    LABS:                        10.5   8.45  )-----------( 71       ( 31 Mar 2018 11:20 )             31.0     03-30    130<L>  |  96  |  26<H>  ----------------------------<  188<H>  4.1   |  25  |  0.40<L>    Ca    7.8<L>      30 Mar 2018 06:33    TPro  x   /  Alb  2.5<L>  /  TBili  x   /  DBili  x   /  AST  x   /  ALT  x   /  AlkPhos  x   03-30      Albumin: Albumin, Serum: 2.5 g/dL (03-30 @ 06:33)      Allergies    No Known Allergies    Intolerances      MEDICATIONS  (STANDING):  aspirin enteric coated 81 milliGRAM(s) Oral daily  dexamethasone     Tablet 4 milliGRAM(s) Oral three times a day  dextrose 5%. 1000 milliLiter(s) (50 mL/Hr) IV Continuous <Continuous>  dextrose 50% Injectable 12.5 Gram(s) IV Push once  dextrose 50% Injectable 25 Gram(s) IV Push once  dextrose 50% Injectable 25 Gram(s) IV Push once  docusate sodium 100 milliGRAM(s) Oral three times a day  insulin glargine Injectable (LANTUS) 10 Unit(s) SubCutaneous every morning  insulin lispro (HumaLOG) corrective regimen sliding scale   SubCutaneous three times a day before meals  sodium chloride 0.9%. 1000 milliLiter(s) (50 mL/Hr) IV Continuous <Continuous>    MEDICATIONS  (PRN):  dextrose Gel 1 Dose(s) Oral once PRN Blood Glucose LESS THAN 70 milliGRAM(s)/deciliter  glucagon  Injectable 1 milliGRAM(s) IntraMuscular once PRN Glucose LESS THAN 70 milligrams/deciliter  oxyCODONE    5 mG/acetaminophen 325 mG 1 Tablet(s) Oral every 6 hours PRN Moderate Pain (4 - 6)      RADIOLOGY:

## 2018-03-31 NOTE — PROGRESS NOTE ADULT - SUBJECTIVE AND OBJECTIVE BOX
82 y/o female who was completed  Cyberknife radiosurgery to her 2 brain mets 3/27/18  for her metastatic lung cancer which she finished on Monday associated with lethargy which was gradually getting worse, not eating and drinking properly. She went to pmd for follow up and evaluation who found pt to have very high blood sugar and sent here for evaluation     3/30:  lethargic, wakes up no c/o at this time  3/31; no change  in her status; answers with strong voice 'No" when asked if she is cold or if she wants something to eat    Vital Signs Last 24 Hrs  T(C): 36.5 (31 Mar 2018 04:43), Max: 36.5 (31 Mar 2018 04:43)  T(F): 97.7 (31 Mar 2018 04:43), Max: 97.7 (31 Mar 2018 04:43)  HR: 75 (31 Mar 2018 04:43) (75 - 94)  BP: 124/47 (31 Mar 2018 04:43) (108/58 - 124/47)  BP(mean): --  RR: 16 (31 Mar 2018 04:43) (16 - 17)  SpO2: 100% (31 Mar 2018 04:43) (96% - 100%)  Neck: no palpable lymphadenopathy  Lungs: Clear to ascultation bilaterally and no wheezes  Cardiac: normal S1, S2, no murmurs  Abdomen: soft, nontender with no ascites  Extremities: no peripheral edema, good pulses bilaterally; right arm sling  Neuro: somnolent, strong voice, follows commands with closed eyes, weak        TOXIC METAB ENCEPHALOPATHY  METASTATIC LUNG CA  T2DM    - c/w present rx; there is no improvement in her MS  -  blood cx r/o bacteremia as a cause for her MS: negative  - RXT suggested to consult palliative; daughter not ready for it  - c/w sliding scale and CORTES; pt is eating  - palliative to see him again Mon re: poss hospice referral, daughter will probably consider; as of now she still hopes pt will be able to go to Rehab 82 y/o female who was completed  Cyberknife radiosurgery to her 2 brain mets 3/27/18  for her metastatic lung cancer which she finished on Monday associated with lethargy which was gradually getting worse, not eating and drinking properly. She went to pmd for follow up and evaluation who found pt to have very high blood sugar and sent here for evaluation     3/30:  lethargic, wakes up no c/o at this time  3/31; no change  in her status; answers with strong voice 'No" when asked if she is cold or if she wants something to eat    Vital Signs Last 24 Hrs  T(C): 36.5 (31 Mar 2018 04:43), Max: 36.5 (31 Mar 2018 04:43)  T(F): 97.7 (31 Mar 2018 04:43), Max: 97.7 (31 Mar 2018 04:43)  HR: 75 (31 Mar 2018 04:43) (75 - 94)  BP: 124/47 (31 Mar 2018 04:43) (108/58 - 124/47)  BP(mean): --  RR: 16 (31 Mar 2018 04:43) (16 - 17)  SpO2: 100% (31 Mar 2018 04:43) (96% - 100%)  Neck: no palpable lymphadenopathy  Lungs: Clear to ascultation bilaterally and no wheezes  Cardiac: normal S1, S2, no murmurs  Abdomen: soft, nontender with no ascites  Extremities: no peripheral edema, good pulses bilaterally; right arm sling  Neuro: somnolent, strong voice, follows commands with closed eyes, weak        TOXIC METAB ENCEPHALOPATHY  METASTATIC LUNG CA  T2DM    - c/w present rx; there is no improvement in her MS  -  blood cx r/o bacteremia as a cause for her MS: negative  - RXT suggested to consult palliative; daughter not ready for it  - c/w sliding scale and CORTES; pt is eating  - palliative to see him again Mon re: poss hospice referral, daughter will probably consider; as of now she still hopes pt will be able to go to Rehab  - stable thrombocytopenia; repeat labs in am;

## 2018-04-01 LAB
GLUCOSE BLDC GLUCOMTR-MCNC: 155 MG/DL — HIGH (ref 70–99)
GLUCOSE BLDC GLUCOMTR-MCNC: 184 MG/DL — HIGH (ref 70–99)
GLUCOSE BLDC GLUCOMTR-MCNC: 240 MG/DL — HIGH (ref 70–99)
GLUCOSE BLDC GLUCOMTR-MCNC: 304 MG/DL — HIGH (ref 70–99)

## 2018-04-01 RX ORDER — INSULIN GLARGINE 100 [IU]/ML
5 INJECTION, SOLUTION SUBCUTANEOUS AT BEDTIME
Qty: 0 | Refills: 0 | Status: DISCONTINUED | OUTPATIENT
Start: 2018-04-01 | End: 2018-04-06

## 2018-04-01 RX ADMIN — Medication 4: at 19:16

## 2018-04-01 RX ADMIN — Medication 4 MILLIGRAM(S): at 14:19

## 2018-04-01 RX ADMIN — SODIUM CHLORIDE 50 MILLILITER(S): 9 INJECTION INTRAMUSCULAR; INTRAVENOUS; SUBCUTANEOUS at 04:50

## 2018-04-01 RX ADMIN — Medication 1: at 14:22

## 2018-04-01 RX ADMIN — Medication 4 MILLIGRAM(S): at 21:17

## 2018-04-01 RX ADMIN — Medication 650 MILLIGRAM(S): at 10:03

## 2018-04-01 RX ADMIN — Medication 100 MILLIGRAM(S): at 04:49

## 2018-04-01 RX ADMIN — Medication 4 MILLIGRAM(S): at 04:48

## 2018-04-01 RX ADMIN — INSULIN GLARGINE 5 UNIT(S): 100 INJECTION, SOLUTION SUBCUTANEOUS at 21:17

## 2018-04-01 RX ADMIN — Medication 1: at 09:11

## 2018-04-01 RX ADMIN — Medication 100 MILLIGRAM(S): at 21:17

## 2018-04-01 RX ADMIN — Medication 81 MILLIGRAM(S): at 14:19

## 2018-04-01 RX ADMIN — Medication 100 MILLIGRAM(S): at 14:22

## 2018-04-01 NOTE — PROGRESS NOTE ADULT - SUBJECTIVE AND OBJECTIVE BOX
84 y/o female who was completed  Cyberknife radiosurgery to her 2 brain mets 3/27/18  for her metastatic lung cancer which she finished on Monday associated with lethargy which was gradually getting worse, not eating and drinking properly. She went to pmd for follow up and evaluation who found pt to have very high blood sugar and sent here for evaluation     3/30:  lethargic, wakes up no c/o at this time  3/31; no change  in her status; answers with strong voice 'No" when asked if she is cold or if she wants something to eat  4/1: no change in her status; lethargic, wants be left alone, will not open eyes    Vital Signs Last 24 Hrs  T(C): 36.4 (01 Apr 2018 04:52), Max: 36.4 (31 Mar 2018 14:35)  T(F): 97.6 (01 Apr 2018 04:52), Max: 97.6 (01 Apr 2018 04:52)  HR: 94 (01 Apr 2018 04:52) (92 - 97)  BP: 105/51 (01 Apr 2018 04:52) (104/57 - 110/62)  BP(mean): --  RR: 16 (01 Apr 2018 04:52) (16 - 16)  SpO2: 97% (01 Apr 2018 04:52) (97% - 98%)  Neck: no palpable lymphadenopathy  Lungs: Clear to ascultation bilaterally and no wheezes  Cardiac: normal S1, S2, no murmurs  Abdomen: soft, nontender with no ascites  Extremities: no peripheral edema, good pulses bilaterally; right arm sling  Neuro: somnolent, strong voice, follows commands with closed eyes, weak        TOXIC METAB ENCEPHALOPATHY  METASTATIC LUNG CA  T2DM    - c/w present rx; there is no improvement in her MS  -  blood cx r/o bacteremia as a cause for her MS: negative  - RXT suggested to consult palliative; daughter not ready for it  - c/w sliding scale and CORTES; decrease CORTES to 5 units  - palliative to see him again Mon re: poss hospice referral, daughter will probably consider; as of now she still hopes pt will be able to go to Rehab  - stable thrombocytopenia; repeat labs in am;

## 2018-04-02 ENCOUNTER — TRANSCRIPTION ENCOUNTER (OUTPATIENT)
Age: 83
End: 2018-04-02

## 2018-04-02 LAB
ANION GAP SERPL CALC-SCNC: 6 MMOL/L — SIGNIFICANT CHANGE UP (ref 5–17)
BUN SERPL-MCNC: 18 MG/DL — SIGNIFICANT CHANGE UP (ref 7–23)
CALCIUM SERPL-MCNC: 8 MG/DL — LOW (ref 8.5–10.1)
CHLORIDE SERPL-SCNC: 96 MMOL/L — SIGNIFICANT CHANGE UP (ref 96–108)
CO2 SERPL-SCNC: 26 MMOL/L — SIGNIFICANT CHANGE UP (ref 22–31)
CREAT SERPL-MCNC: 0.29 MG/DL — LOW (ref 0.5–1.3)
GLUCOSE BLDC GLUCOMTR-MCNC: 146 MG/DL — HIGH (ref 70–99)
GLUCOSE BLDC GLUCOMTR-MCNC: 182 MG/DL — HIGH (ref 70–99)
GLUCOSE BLDC GLUCOMTR-MCNC: 188 MG/DL — HIGH (ref 70–99)
GLUCOSE BLDC GLUCOMTR-MCNC: 217 MG/DL — HIGH (ref 70–99)
GLUCOSE SERPL-MCNC: 151 MG/DL — HIGH (ref 70–99)
HCT VFR BLD CALC: 33.3 % — LOW (ref 34.5–45)
HGB BLD-MCNC: 11.1 G/DL — LOW (ref 11.5–15.5)
MCHC RBC-ENTMCNC: 28.2 PG — SIGNIFICANT CHANGE UP (ref 27–34)
MCHC RBC-ENTMCNC: 33.3 GM/DL — SIGNIFICANT CHANGE UP (ref 32–36)
MCV RBC AUTO: 84.7 FL — SIGNIFICANT CHANGE UP (ref 80–100)
NRBC # BLD: 0 /100 WBCS — SIGNIFICANT CHANGE UP (ref 0–0)
PLATELET # BLD AUTO: 81 K/UL — LOW (ref 150–400)
POTASSIUM SERPL-MCNC: 3.8 MMOL/L — SIGNIFICANT CHANGE UP (ref 3.5–5.3)
POTASSIUM SERPL-SCNC: 3.8 MMOL/L — SIGNIFICANT CHANGE UP (ref 3.5–5.3)
RBC # BLD: 3.93 M/UL — SIGNIFICANT CHANGE UP (ref 3.8–5.2)
RBC # FLD: 18.8 % — HIGH (ref 10.3–14.5)
SODIUM SERPL-SCNC: 128 MMOL/L — LOW (ref 135–145)
WBC # BLD: 4.64 K/UL — SIGNIFICANT CHANGE UP (ref 3.8–10.5)
WBC # FLD AUTO: 4.64 K/UL — SIGNIFICANT CHANGE UP (ref 3.8–10.5)

## 2018-04-02 RX ORDER — ZINC OXIDE 200 MG/G
0 OINTMENT TOPICAL
Qty: 0 | Refills: 0 | COMMUNITY

## 2018-04-02 RX ORDER — METOPROLOL TARTRATE 50 MG
1 TABLET ORAL
Qty: 0 | Refills: 0 | COMMUNITY

## 2018-04-02 RX ORDER — SPIRONOLACTONE 25 MG/1
1 TABLET, FILM COATED ORAL
Qty: 0 | Refills: 0 | COMMUNITY

## 2018-04-02 RX ORDER — WHEAT DEXTRIN 3 G/4 G
0 POWDER IN PACKET (EA) ORAL
Qty: 0 | Refills: 0 | COMMUNITY

## 2018-04-02 RX ORDER — INSULIN GLARGINE 100 [IU]/ML
5 INJECTION, SOLUTION SUBCUTANEOUS
Qty: 0 | Refills: 0 | COMMUNITY
Start: 2018-04-02

## 2018-04-02 RX ADMIN — Medication 100 MILLIGRAM(S): at 13:52

## 2018-04-02 RX ADMIN — Medication 1: at 17:39

## 2018-04-02 RX ADMIN — Medication 100 MILLIGRAM(S): at 22:46

## 2018-04-02 RX ADMIN — Medication 4 MILLIGRAM(S): at 22:46

## 2018-04-02 RX ADMIN — Medication 4 MILLIGRAM(S): at 13:52

## 2018-04-02 RX ADMIN — Medication 1: at 11:58

## 2018-04-02 RX ADMIN — Medication 81 MILLIGRAM(S): at 11:58

## 2018-04-02 RX ADMIN — Medication 100 MILLIGRAM(S): at 04:51

## 2018-04-02 RX ADMIN — INSULIN GLARGINE 5 UNIT(S): 100 INJECTION, SOLUTION SUBCUTANEOUS at 22:45

## 2018-04-02 RX ADMIN — Medication 4 MILLIGRAM(S): at 04:51

## 2018-04-02 NOTE — PROGRESS NOTE ADULT - SUBJECTIVE AND OBJECTIVE BOX
HPI: Pt seen and examined this am in follow up for sx. Pt awake and alert, denies pain or discomfort. She notes eating this am and willingness to work with PT. Still somewhat confused, unable to orient to time or circumstance. No other issues as per nursing. Plan for PT reeval.       PAIN: denies  DYSPNEA: denies      ROS:      Nausea- denies  Fatigue- yes, mod  Weakness- yes, mod    All other systems reviewed and negative      PHYSICAL EXAM:    Vital Signs Last 24 Hrs  T(C): 36.7 (02 Apr 2018 04:49), Max: 36.7 (02 Apr 2018 04:49)  T(F): 98 (02 Apr 2018 04:49), Max: 98 (02 Apr 2018 04:49)  HR: 100 (02 Apr 2018 04:49) (87 - 100)  BP: 113/66 (02 Apr 2018 04:49) (101/56 - 113/66)  BP(mean): --  RR: 18 (02 Apr 2018 04:49) (16 - 18)  SpO2: 100% (02 Apr 2018 04:49) (99% - 100%)  Daily     Daily     PPSV2: 20-30  %    General: Elderly female, tired, but wakes easily to voice, cooperative, pleasant, NAD  Mental Status: AOx2 (not time or circumstance)  HEENT:  perrl, eomi, +temporal wasting  Lungs: clear  Cardiac: +s1 s2 rrr  GI: soft nt nd +bs  : voids  Ext: R arm in sling, no edema, rom without pain in LE  Neuro: weakness in LE due to poor effort 4/5 bl LE, otherwise no focal deficits    LABS:                        11.1   4.64  )-----------( x        ( 02 Apr 2018 10:44 )             33.3     04-02    128<L>  |  96  |  18  ----------------------------<  151<H>  3.8   |  26  |  0.29<L>    Ca    8.0<L>      02 Apr 2018 10:44        Albumin: Albumin, Serum: 2.5 g/dL (03-30 @ 06:33)      Allergies    No Known Allergies    Intolerances      MEDICATIONS  (STANDING):  aspirin enteric coated 81 milliGRAM(s) Oral daily  dexamethasone     Tablet 4 milliGRAM(s) Oral three times a day  dextrose 5%. 1000 milliLiter(s) (50 mL/Hr) IV Continuous <Continuous>  dextrose 50% Injectable 12.5 Gram(s) IV Push once  dextrose 50% Injectable 25 Gram(s) IV Push once  dextrose 50% Injectable 25 Gram(s) IV Push once  docusate sodium 100 milliGRAM(s) Oral three times a day  insulin glargine Injectable (LANTUS) 5 Unit(s) SubCutaneous at bedtime  insulin lispro (HumaLOG) corrective regimen sliding scale   SubCutaneous three times a day before meals    MEDICATIONS  (PRN):  acetaminophen   Tablet 650 milliGRAM(s) Oral every 6 hours PRN mild to moderate pain  dextrose Gel 1 Dose(s) Oral once PRN Blood Glucose LESS THAN 70 milliGRAM(s)/deciliter  glucagon  Injectable 1 milliGRAM(s) IntraMuscular once PRN Glucose LESS THAN 70 milligrams/deciliter  oxyCODONE    5 mG/acetaminophen 325 mG 1 Tablet(s) Oral every 6 hours PRN Severe Pain (7 - 10)      RADIOLOGY:

## 2018-04-02 NOTE — DISCHARGE NOTE ADULT - PATIENT PORTAL LINK FT
You can access the [facility] Patient Portal, offered by [facility], by registering with the following website: [email or web address]

## 2018-04-02 NOTE — DISCHARGE NOTE ADULT - PLAN OF CARE
improved PT at rehab radiation after rehab Steroid dose to be adjusted by Radiation/Oncology Radiation after rehab.   Repeat labs (cbc , bmp) early next week.

## 2018-04-02 NOTE — DISCHARGE NOTE ADULT - HOSPITAL COURSE
see progress note Vital Signs Last 24 Hrs  T(C): 36.4 (06 Apr 2018 05:30), Max: 36.4 (05 Apr 2018 12:44)  T(F): 97.5 (06 Apr 2018 05:30), Max: 97.5 (05 Apr 2018 12:44)  HR: 78 (06 Apr 2018 05:30) (78 - 82)  BP: 108/54 (06 Apr 2018 05:30) (101/50 - 108/54)  BP(mean): --  RR: 18 (06 Apr 2018 05:30) (18 - 18)  SpO2: 100% (06 Apr 2018 05:30) (96% - 100%)    PHYSICAL EXAM:  General: ill appearing elderly female, lying in bed, nad; good O2 sats on 2LNC   Neuro: pleasantly confused; lethargic but arousable; follows some commands  HEENT: NCAT, MMM  Neck: Soft and supple  Respiratory: CTA b/l, no w/r/r  Cardiovascular: S1 and S2, RRR  Gastrointestinal: +BS, soft, NTND  Extremities: RUE ecchymosis; sling  Vascular: 2+ peripheral pulses  Musculoskeletal: moving extrem.     ASSESSMENT and PLAN:  83y female w/     *acute encephalopathy due to brain mets, small cell lung cancer  - confusion improving   - s/p cyberknife radiosurgery 3/27 which she completed last Monday  - no infectious cause found  - continue dexamethasone  - Rad/Onc f/u appreciated- outpt f/u   - Palliative f/u appreciated- guarded prognosis, DNR/I, limited MOLST    - Heme/Onc f/u appreciated- no plans for chemo or immunotherapy at this time     *pancytopenia  - in setting of above  - stable, monitor      *hyponatremia  - likely due to SIADH, lung cancer and decreased po intake    *T2D  - ISS, fingersticks    *fractures  pelvic and humerus since early march admission  - Ortho f/u appreciated- right arm sling, WBAT w/ assist, rolling walker  - PT eval appreciated- TRACY     *COPD  - O2 dependent, stable      4/6- Patient is medically stable for discharge.  She will not be receiving chemo or radiation therapy while at rehab.

## 2018-04-02 NOTE — DISCHARGE NOTE ADULT - CARE PROVIDER_API CALL
Patricia Osorio (MD), Radiation Oncology  [address] [place] [address]  Phone: [phone]  Fax: [phone]

## 2018-04-02 NOTE — PROGRESS NOTE ADULT - SUBJECTIVE AND OBJECTIVE BOX
84 y/o female who was completed  Cyberknife radiosurgery to her 2 brain mets 3/27/18  for her metastatic lung cancer which she finished on Monday associated with lethargy which was gradually getting worse, not eating and drinking properly. She went to pmd for follow up and evaluation who found pt to have very high blood sugar and sent here for evaluation     3/30:  lethargic, wakes up no c/o at this time  3/31; no change  in her status; answers with strong voice 'No" when asked if she is cold or if she wants something to eat  4/1: no change in her status; lethargic, wants be left alone, will not open eyes  4/2: sudden improvement, sitting up eating breakfast with help, very pleasant    Vital Signs Last 24 Hrs  T(C): 36.7 (02 Apr 2018 04:49), Max: 36.7 (02 Apr 2018 04:49)  T(F): 98 (02 Apr 2018 04:49), Max: 98 (02 Apr 2018 04:49)  HR: 100 (02 Apr 2018 04:49) (87 - 100)  BP: 113/66 (02 Apr 2018 04:49) (108/56 - 113/66)  BP(mean): --  RR: 18 (02 Apr 2018 04:49) (16 - 18)  SpO2: 100% (02 Apr 2018 04:49) (100% - 100%)    Neck: no palpable lymphadenopathy  Lungs: Clear to ascultation bilaterally and no wheezes  Cardiac: normal S1, S2, no murmurs  Abdomen: soft, nontender with no ascites  Extremities: no peripheral edema, good pulses bilaterally; right arm sling  Neuro: awake alert, responds all my questions, seems to have a v good appetite         TOXIC METAB ENCEPHALOPATHY  METASTATIC LUNG CA s/p RXT Cyberknife radiosurgery to her 2 brain mets 3/27/18  for her metastatic lung cancer which she finished on Monday   T2DM    - lethargy improved; rehab referral  -  blood cx r/o bacteremia as a cause for her MS: negative  - RXT suggested to consult palliative; daughter not ready for it  - c/w sliding scale and CORTES; decrease CORTES to 5 units  - stable thrombocytopenia; repeat labs in am;

## 2018-04-02 NOTE — DISCHARGE NOTE ADULT - MEDICATION SUMMARY - MEDICATIONS TO STOP TAKING
I will STOP taking the medications listed below when I get home from the hospital:    spironolactone 25 mg oral tablet  -- 1 tab(s) by mouth once a day    enalapril 2.5 mg oral tablet  -- 0.5 tab(s) by mouth once a day    metoprolol succinate 50 mg oral tablet, extended release  -- 1 tab(s) by mouth once a day    Benefiber oral powder for reconstitution  -- Used in coffee every morning    Desitin 40% topical paste  -- Apply on skin to affected area    enalapril 2.5 mg oral tablet  -- 1 tab(s) by mouth once a day (in the morning)    spironolactone 25 mg oral tablet  -- 1 tab(s) by mouth once a day

## 2018-04-02 NOTE — DISCHARGE NOTE ADULT - MEDICATION SUMMARY - MEDICATIONS TO TAKE
I will START or STAY ON the medications listed below when I get home from the hospital:    dexamethasone 4 mg oral tablet  -- 1 tab(s) by mouth 3 times a day    **Patient is on Tapering Schedule**  4 mg every 8 hours (3/28/18 - 4/1/18)  4 mg every 12 hours (4/2/18 - 4/6/18)  2 mg every 12 hours (4/7/18 - 4/9/18)  2 mg once a day  (4/10/18- 4/12/18)  -- Indication: For brain masses    oxyCODONE-acetaminophen 5 mg-325 mg oral tablet  -- 1 tab(s) by mouth every 6 hours, As Needed for moderate pain   -- Indication: For .    Aspirin Enteric Coated 81 mg oral delayed release tablet  -- 1 tab(s) by mouth once a day  -- Indication: For .    insulin glargine  -- 5 unit(s) subcutaneous once a day (at bedtime)  -- Indication: For .    atorvastatin 10 mg oral tablet  -- 1 tab(s) by mouth once a day (at bedtime)  -- Indication: For .    ipratropium-albuterol 0.5 mg-2.5 mg/3 mLinhalation solution  -- 3 milliliter(s) inhaled 4 times a day, As Needed  -- Indication: For .    pantoprazole 40 mg oral delayed release tablet  -- 1 tab(s) by mouth once a day before a meal  -- Indication: For . I will START or STAY ON the medications listed below when I get home from the hospital:    dexamethasone 4 mg oral tablet  -- 1 tab(s) by mouth 3 times a day    **Patient is on Tapering Schedule**  4 mg every 8 hours (3/28/18 - 4/1/18)  4 mg every 12 hours (4/2/18 - 4/6/18)  2 mg every 12 hours (4/7/18 - 4/9/18)  2 mg once a day  (4/10/18- 4/12/18)  -- Indication: For brain masses    oxyCODONE-acetaminophen 5 mg-325 mg oral tablet  -- 1 tab(s) by mouth every 6 hours, As Needed for moderate pain   -- Indication: For pain    Aspirin Enteric Coated 81 mg oral delayed release tablet  -- 1 tab(s) by mouth once a day  -- Indication: For Home med    insulin glargine  -- 5 unit(s) subcutaneous once a day (at bedtime)  -- Indication: For Home med    atorvastatin 10 mg oral tablet  -- 1 tab(s) by mouth once a day (at bedtime)  -- Indication: For Home med    ipratropium-albuterol 0.5 mg-2.5 mg/3 mLinhalation solution  -- 3 milliliter(s) inhaled 4 times a day, As Needed  -- Indication: For Home med    pantoprazole 40 mg oral delayed release tablet  -- 1 tab(s) by mouth once a day before a meal  -- Indication: For Home med

## 2018-04-02 NOTE — DISCHARGE NOTE ADULT - CARE PLAN
Principal Discharge DX:	Lethargy  Goal:	improved  Assessment and plan of treatment:	PT at rehab  Secondary Diagnosis:	Malignant neoplasm of lung, unspecified laterality, unspecified part of lung  Goal:	radiation after rehab Principal Discharge DX:	Lethargy  Goal:	improved  Assessment and plan of treatment:	PT at rehab  Secondary Diagnosis:	Malignant neoplasm of lung, unspecified laterality, unspecified part of lung  Goal:	radiation after rehab  Assessment and plan of treatment:	Steroid dose to be adjusted by Radiation/Oncology Principal Discharge DX:	Lethargy  Goal:	improved  Assessment and plan of treatment:	PT at rehab  Secondary Diagnosis:	Malignant neoplasm of lung, unspecified laterality, unspecified part of lung  Goal:	radiation after rehab  Assessment and plan of treatment:	Radiation after rehab.   Repeat labs (cbc , bmp) early next week.

## 2018-04-03 LAB
CULTURE RESULTS: SIGNIFICANT CHANGE UP
GLUCOSE BLDC GLUCOMTR-MCNC: 147 MG/DL — HIGH (ref 70–99)
GLUCOSE BLDC GLUCOMTR-MCNC: 202 MG/DL — HIGH (ref 70–99)
GLUCOSE BLDC GLUCOMTR-MCNC: 216 MG/DL — HIGH (ref 70–99)
GLUCOSE BLDC GLUCOMTR-MCNC: 293 MG/DL — HIGH (ref 70–99)
SPECIMEN SOURCE: SIGNIFICANT CHANGE UP

## 2018-04-03 RX ADMIN — INSULIN GLARGINE 5 UNIT(S): 100 INJECTION, SOLUTION SUBCUTANEOUS at 21:19

## 2018-04-03 RX ADMIN — Medication 100 MILLIGRAM(S): at 21:19

## 2018-04-03 RX ADMIN — Medication 4 MILLIGRAM(S): at 21:19

## 2018-04-03 RX ADMIN — Medication 2: at 16:44

## 2018-04-03 RX ADMIN — Medication 4 MILLIGRAM(S): at 05:04

## 2018-04-03 RX ADMIN — Medication 100 MILLIGRAM(S): at 05:04

## 2018-04-03 RX ADMIN — Medication 100 MILLIGRAM(S): at 13:01

## 2018-04-03 RX ADMIN — Medication 81 MILLIGRAM(S): at 11:33

## 2018-04-03 RX ADMIN — Medication 4 MILLIGRAM(S): at 13:01

## 2018-04-03 RX ADMIN — Medication 3: at 11:33

## 2018-04-03 NOTE — PROGRESS NOTE ADULT - SUBJECTIVE AND OBJECTIVE BOX
HPI: Pt seen and examined earlier in afternoon in follow up for marin nguyễn at bedside. Pt noting today was not as good as yesterday, though unable to say why. She did eat well today, also endorsed by niece. She denied pain or discomfort. Just feeling "grumpy." As per nursing, PT had not come back to reassess and was working on this. Pt agreeable to work with them if they come.       PAIN: Denies  DYSPNEA: denies      ROS:    Nausea- denies  Fatigue- yes, mod  Weakness- yes, mod    All other systems reviewed and negative    PHYSICAL EXAM:    Vital Signs Last 24 Hrs  T(C): 36.6 (03 Apr 2018 13:12), Max: 36.6 (03 Apr 2018 13:12)  T(F): 97.8 (03 Apr 2018 13:12), Max: 97.8 (03 Apr 2018 13:12)  HR: 100 (03 Apr 2018 13:12) (88 - 100)  BP: 95/46 (03 Apr 2018 13:12) (95/46 - 106/55)  BP(mean): --  RR: 18 (03 Apr 2018 13:12) (16 - 18)  SpO2: 96% (03 Apr 2018 13:12) (96% - 100%)  Daily     Daily     PPSV2: 20-30  %    General: Elderly female, awake alert, annoyed, but cooperative, NAD  Mental Status: AOx2 (not time or circumstance)  HEENT:  perrl, eomi, +temporal wasting  Lungs: clear  Cardiac: +s1 s2 rrr  GI: soft nt nd +bs  : voids  Ext: R arm in sling, no edema, rom without pain in LE  Neuro: weakness in LE due to poor effort 4/5 bl LE, otherwise no focal deficits    LABS:                        11.1   4.64  )-----------( 81       ( 02 Apr 2018 10:44 )             33.3     04-02    128<L>  |  96  |  18  ----------------------------<  151<H>  3.8   |  26  |  0.29<L>    Ca    8.0<L>      02 Apr 2018 10:44        Albumin: Albumin, Serum: 2.5 g/dL (03-30 @ 06:33)      Allergies    No Known Allergies    Intolerances      MEDICATIONS  (STANDING):  aspirin enteric coated 81 milliGRAM(s) Oral daily  dexamethasone     Tablet 4 milliGRAM(s) Oral three times a day  dextrose 5%. 1000 milliLiter(s) (50 mL/Hr) IV Continuous <Continuous>  dextrose 50% Injectable 12.5 Gram(s) IV Push once  dextrose 50% Injectable 25 Gram(s) IV Push once  dextrose 50% Injectable 25 Gram(s) IV Push once  docusate sodium 100 milliGRAM(s) Oral three times a day  insulin glargine Injectable (LANTUS) 5 Unit(s) SubCutaneous at bedtime  insulin lispro (HumaLOG) corrective regimen sliding scale   SubCutaneous three times a day before meals    MEDICATIONS  (PRN):  acetaminophen   Tablet 650 milliGRAM(s) Oral every 6 hours PRN mild to moderate pain  dextrose Gel 1 Dose(s) Oral once PRN Blood Glucose LESS THAN 70 milliGRAM(s)/deciliter  glucagon  Injectable 1 milliGRAM(s) IntraMuscular once PRN Glucose LESS THAN 70 milligrams/deciliter  oxyCODONE    5 mG/acetaminophen 325 mG 1 Tablet(s) Oral every 6 hours PRN Severe Pain (7 - 10)      RADIOLOGY:

## 2018-04-04 LAB
ANION GAP SERPL CALC-SCNC: 7 MMOL/L — SIGNIFICANT CHANGE UP (ref 5–17)
BUN SERPL-MCNC: 17 MG/DL — SIGNIFICANT CHANGE UP (ref 7–23)
CALCIUM SERPL-MCNC: 7.9 MG/DL — LOW (ref 8.5–10.1)
CHLORIDE SERPL-SCNC: 96 MMOL/L — SIGNIFICANT CHANGE UP (ref 96–108)
CO2 SERPL-SCNC: 28 MMOL/L — SIGNIFICANT CHANGE UP (ref 22–31)
CREAT SERPL-MCNC: 0.24 MG/DL — LOW (ref 0.5–1.3)
GLUCOSE BLDC GLUCOMTR-MCNC: 157 MG/DL — HIGH (ref 70–99)
GLUCOSE BLDC GLUCOMTR-MCNC: 176 MG/DL — HIGH (ref 70–99)
GLUCOSE BLDC GLUCOMTR-MCNC: 249 MG/DL — HIGH (ref 70–99)
GLUCOSE BLDC GLUCOMTR-MCNC: 261 MG/DL — HIGH (ref 70–99)
GLUCOSE SERPL-MCNC: 158 MG/DL — HIGH (ref 70–99)
HCT VFR BLD CALC: 29 % — LOW (ref 34.5–45)
HGB BLD-MCNC: 9.6 G/DL — LOW (ref 11.5–15.5)
MCHC RBC-ENTMCNC: 28.1 PG — SIGNIFICANT CHANGE UP (ref 27–34)
MCHC RBC-ENTMCNC: 33.1 GM/DL — SIGNIFICANT CHANGE UP (ref 32–36)
MCV RBC AUTO: 84.8 FL — SIGNIFICANT CHANGE UP (ref 80–100)
NRBC # BLD: 0 /100 WBCS — SIGNIFICANT CHANGE UP (ref 0–0)
PLATELET # BLD AUTO: 80 K/UL — LOW (ref 150–400)
POTASSIUM SERPL-MCNC: 3.9 MMOL/L — SIGNIFICANT CHANGE UP (ref 3.5–5.3)
POTASSIUM SERPL-SCNC: 3.9 MMOL/L — SIGNIFICANT CHANGE UP (ref 3.5–5.3)
RBC # BLD: 3.42 M/UL — LOW (ref 3.8–5.2)
RBC # FLD: 18.7 % — HIGH (ref 10.3–14.5)
SODIUM SERPL-SCNC: 131 MMOL/L — LOW (ref 135–145)
WBC # BLD: 2.71 K/UL — LOW (ref 3.8–10.5)
WBC # FLD AUTO: 2.71 K/UL — LOW (ref 3.8–10.5)

## 2018-04-04 RX ADMIN — Medication 1: at 13:46

## 2018-04-04 RX ADMIN — Medication 4 MILLIGRAM(S): at 05:20

## 2018-04-04 RX ADMIN — Medication 4 MILLIGRAM(S): at 21:54

## 2018-04-04 RX ADMIN — Medication 100 MILLIGRAM(S): at 13:06

## 2018-04-04 RX ADMIN — Medication 1: at 09:04

## 2018-04-04 RX ADMIN — INSULIN GLARGINE 5 UNIT(S): 100 INJECTION, SOLUTION SUBCUTANEOUS at 21:54

## 2018-04-04 RX ADMIN — Medication 81 MILLIGRAM(S): at 11:42

## 2018-04-04 RX ADMIN — Medication 3: at 19:07

## 2018-04-04 RX ADMIN — Medication 100 MILLIGRAM(S): at 05:20

## 2018-04-04 RX ADMIN — Medication 4 MILLIGRAM(S): at 13:06

## 2018-04-04 RX ADMIN — Medication 100 MILLIGRAM(S): at 21:54

## 2018-04-04 RX ADMIN — Medication 650 MILLIGRAM(S): at 21:54

## 2018-04-04 RX ADMIN — Medication 650 MILLIGRAM(S): at 09:03

## 2018-04-04 NOTE — PROGRESS NOTE ADULT - SUBJECTIVE AND OBJECTIVE BOX
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  Pt admitted w/ failure to thrive, high blood sugars, encephalopathy.  Seen early this morning.  Resting comfortably.  Responds no to most questions.      4/4-    ros- limited due to confusion, as per hpi above      Vital Signs Last 24 Hrs  T(C): 36.2 (04 Apr 2018 04:22), Max: 36.6 (03 Apr 2018 13:12)  T(F): 97.1 (04 Apr 2018 04:22), Max: 97.8 (03 Apr 2018 13:12)  HR: 90 (04 Apr 2018 04:22) (90 - 102)  BP: 103/51 (04 Apr 2018 04:22) (95/46 - 104/56)  BP(mean): --  RR: 18 (04 Apr 2018 04:22) (18 - 18)  SpO2: 97% (04 Apr 2018 04:22) (96% - 99%)    PHYSICAL EXAM:  General: ill appearing elderly female, lying in bed, nad; good O2 sats on 2LNC   Neuro: pleasantly confused; not following commands  HEENT: NCAT, MMM  Neck: Soft and supple  Respiratory: CTA b/l, no w/r/r  Cardiovascular: S1 and S2, RRR  Gastrointestinal: +BS, soft, NTND  Extremities: RUE ecchymosis; sling  Vascular: 2+ peripheral pulses  Musculoskeletal: moving extrem.           LABS: All Labs Reviewed:                        9.6    2.71  )-----------( 80       ( 04 Apr 2018 05:34 )             29.0     04-04    131<L>  |  96  |  17  ----------------------------<  158<H>  3.9   |  28  |  0.24<L>    Ca    7.9<L>      04 Apr 2018 05:34        Culture - Blood (03.30.18 @ 20:11)    Specimen Source: .Blood None    Culture Results:   No growth to date.      MEDICATIONS  (STANDING):  aspirin enteric coated 81 milliGRAM(s) Oral daily  dexamethasone     Tablet 4 milliGRAM(s) Oral three times a day  dextrose 5%. 1000 milliLiter(s) (50 mL/Hr) IV Continuous <Continuous>  dextrose 50% Injectable 12.5 Gram(s) IV Push once  dextrose 50% Injectable 25 Gram(s) IV Push once  dextrose 50% Injectable 25 Gram(s) IV Push once  docusate sodium 100 milliGRAM(s) Oral three times a day  insulin glargine Injectable (LANTUS) 5 Unit(s) SubCutaneous at bedtime  insulin lispro (HumaLOG) corrective regimen sliding scale   SubCutaneous three times a day before meals    MEDICATIONS  (PRN):  acetaminophen   Tablet 650 milliGRAM(s) Oral every 6 hours PRN mild to moderate pain  dextrose Gel 1 Dose(s) Oral once PRN Blood Glucose LESS THAN 70 milliGRAM(s)/deciliter  glucagon  Injectable 1 milliGRAM(s) IntraMuscular once PRN Glucose LESS THAN 70 milligrams/deciliter  oxyCODONE    5 mG/acetaminophen 325 mG 1 Tablet(s) Oral every 6 hours PRN Severe Pain (7 - 10)        ASSESSMENT and PLAN:  83y female w/     *acute encephalopathy due to brain mets, small cell lung cancer  - s/p cyberknife radiosurgery 3/27 which she completed Monday  - no infectious cause found  - continue dexamethasone  - Rad/Onc f/u appreciated- outpt f/u   - Palliative f/u appreciated- guarded prognosis, DNR/I, limited MOLST      *hyponatremia  - likely due to SIADH, lung cancer and decreased po intake    *T2D  - ISS, fingersticks    *fractures  pelvic and humerus since early march admission  - Ortho f/u appreciated- right arm sling, WBAT w/ assist, rolling walker  - PT eval appreciated- TRACY     *COPD  - O2 dependent, stable      *thrombocytopenia  - stable, monitor      *dvt px  - scds     4/4- PT re eval then possible TRACY today. 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  Pt admitted w/ failure to thrive, high blood sugars, encephalopathy.  Seen early this morning.  Resting comfortably.  Responds no to most questions.      4/4- no overnight events.  Comfortable. No complaints.     ros- limited due to confusion, as per hpi above    Vital Signs Last 24 Hrs  T(C): 36.2 (04 Apr 2018 04:22), Max: 36.6 (03 Apr 2018 13:12)  T(F): 97.1 (04 Apr 2018 04:22), Max: 97.8 (03 Apr 2018 13:12)  HR: 90 (04 Apr 2018 04:22) (90 - 102)  BP: 103/51 (04 Apr 2018 04:22) (95/46 - 104/56)  BP(mean): --  RR: 18 (04 Apr 2018 04:22) (18 - 18)  SpO2: 97% (04 Apr 2018 04:22) (96% - 99%)    PHYSICAL EXAM:  General: ill appearing elderly female, lying in bed, nad; good O2 sats on 2LNC   Neuro: pleasantly confused; not following commands  HEENT: NCAT, MMM  Neck: Soft and supple  Respiratory: CTA b/l, no w/r/r  Cardiovascular: S1 and S2, RRR  Gastrointestinal: +BS, soft, NTND  Extremities: RUE ecchymosis; sling  Vascular: 2+ peripheral pulses  Musculoskeletal: moving extrem.           LABS: All Labs Reviewed:                        9.6    2.71  )-----------( 80       ( 04 Apr 2018 05:34 )             29.0     04-04    131<L>  |  96  |  17  ----------------------------<  158<H>  3.9   |  28  |  0.24<L>    Ca    7.9<L>      04 Apr 2018 05:34      Culture - Blood (03.30.18 @ 20:11)    Specimen Source: .Blood None    Culture Results:   No growth to date.        MEDICATIONS  (STANDING):  aspirin enteric coated 81 milliGRAM(s) Oral daily  dexamethasone     Tablet 4 milliGRAM(s) Oral three times a day  dextrose 5%. 1000 milliLiter(s) (50 mL/Hr) IV Continuous <Continuous>  dextrose 50% Injectable 12.5 Gram(s) IV Push once  dextrose 50% Injectable 25 Gram(s) IV Push once  dextrose 50% Injectable 25 Gram(s) IV Push once  docusate sodium 100 milliGRAM(s) Oral three times a day  insulin glargine Injectable (LANTUS) 5 Unit(s) SubCutaneous at bedtime  insulin lispro (HumaLOG) corrective regimen sliding scale   SubCutaneous three times a day before meals    MEDICATIONS  (PRN):  acetaminophen   Tablet 650 milliGRAM(s) Oral every 6 hours PRN mild to moderate pain  dextrose Gel 1 Dose(s) Oral once PRN Blood Glucose LESS THAN 70 milliGRAM(s)/deciliter  glucagon  Injectable 1 milliGRAM(s) IntraMuscular once PRN Glucose LESS THAN 70 milligrams/deciliter  oxyCODONE    5 mG/acetaminophen 325 mG 1 Tablet(s) Oral every 6 hours PRN Severe Pain (7 - 10)          ASSESSMENT and PLAN:  83y female w/     *acute encephalopathy due to brain mets, small cell lung cancer  - s/p cyberknife radiosurgery 3/27 which she completed Monday  - no infectious cause found  - continue dexamethasone  - Rad/Onc f/u appreciated- outpt f/u   - Palliative f/u appreciated- guarded prognosis, DNR/I, limited MOLST      *hyponatremia  - likely due to SIADH, lung cancer and decreased po intake    *T2D  - ISS, fingersticks    *fractures  pelvic and humerus since early march admission  - Ortho f/u appreciated- right arm sling, WBAT w/ assist, rolling walker  - PT eval appreciated- TRACY     *COPD  - O2 dependent, stable      *thrombocytopenia  - stable, monitor      *dvt px  - scds     4/4- PT re eval then possible TRACY today.

## 2018-04-04 NOTE — CHART NOTE - NSCHARTNOTEFT_GEN_A_CORE
Upon Nutritional Assessment by the Registered Dietitian your patient was determined to meet criteria / has evidence of the following diagnosis/diagnoses:          [ ]  Mild Protein Calorie Malnutrition        [ ]  Moderate Protein Calorie Malnutrition        [ x] Severe Protein Calorie Malnutrition        [ ] Unspecified Protein Calorie Malnutrition        [ ] Underweight / BMI <19        [ ] Morbid Obesity / BMI > 40      Findings as based on:  •  Comprehensive nutrition assessment and consultation  •  Calorie counts (nutrient intake analysis)  •  Food acceptance and intake status from observations by staff  •  Follow up  •  Patient education  •  Intervention secondary to interdisciplinary rounds  •   concerns    Pt meets criteria for severe protein-calorie malnutrition in context of chronic disease.    Pt reports 20% loss of UBW in past several months, timeline unspecified   Nutrition focused physical exam reveals severe muscle wasting (thighs, calves),   moderate muscle wasting (temples, scapula, interosseus, clavicles, shoulders),   moderate fat depletion (triceps, ribs).        Treatment:    The following diet has been recommended:  Suggest maintain Consistent Carbohydrate diet, liberalize to regular if PO intake falls to < 75% nutrient needs  add Glucerna 8 oz tid,   add Prosource one packet to Glucerna BID.   Add vit C 500 mg/day,   Check Zn levels,  Add 220 mg Zn sulfate bid x 10 days if Zn levels low.       PROVIDER Section:     By signing this assessment you are acknowledging and agree with the diagnosis/diagnoses assigned by the Registered Dietitian    Comments:

## 2018-04-04 NOTE — DIETITIAN INITIAL EVALUATION ADULT. - ENERGY NEEDS
Ht.   64   "        Wt.  54.4      kg               BMI  20.6                IBW  54  kg               Pt is at 100   %  IBW

## 2018-04-04 NOTE — DIETITIAN INITIAL EVALUATION ADULT. - OTHER INFO
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  Pt admitted w/ failure to thrive, high blood sugars, encephalopathy.  Seen early this morning.  Resting comfortably.  Responds no to most questions.  Assessment as per EMR: encephalopathy, hyponatremia, COPD, DM, thrombocytopenia, fractures.  Palliative consult expected. 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  Pt admitted w/ failure to thrive, high blood sugars, encephalopathy.  Seen early this morning.  Resting comfortably.  Responds no to most questions.  Assessment as per EMR: encephalopathy, hyponatremia, COPD, DM, thrombocytopenia, fractures.  Palliative consult expected.  No edema noted, Abe 15.  Pressure ulcers:  Stage II right buttock, stage II thoracic spine.  Pt on DM diet, as per daughter and nursing  pt eating very well.. Pt meets criteria for severe protein-calorie malnutrition in context of chronic disease.  Pt reports 20% loss of UBW in past several months, nutrition focused physical exam reveals severe muscle wasting (thighs, calves), moderate muscle wasting (temples, scapula, interosseus, clavicles, shoulders), moderate fat depletion (triceps, ribs).    Suggest maintain current diet, add Glucerna 8 oz tid, add prosource one packet to prosource BID.  Add vit C 500 mg/day, check Zn levels, add 220 mg Zn sulfate bid x 10 days if Zn levels low. Will monitor labs, weight, PO intake. 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  Pt admitted w/ failure to thrive, high blood sugars, encephalopathy.  Seen early this morning.  Resting comfortably.  Responds no to most questions.  Assessment as per EMR: encephalopathy, hyponatremia, COPD, DM, thrombocytopenia, fractures.  Palliative consult expected.  No edema noted, Abe 15.  Pressure ulcers:  Stage II right buttock, stage II thoracic spine.  Pt on DM diet, as per daughter and nursing  pt eating very well.. Pt meets criteria for severe protein-calorie malnutrition in context of chronic disease.  Pt reports 20% loss of UBW in past several months, nutrition focused physical exam reveals severe muscle wasting (thighs, calves), moderate muscle wasting (temples, scapula, interosseus, clavicles, shoulders), moderate fat depletion (triceps, ribs).    Suggest maintain current diet, add Glucerna 8 oz tid, add prosource one packet to Glucerna BID.  Add vit C 500 mg/day, check Zn levels, add 220 mg Zn sulfate bid x 10 days if Zn levels low. Will monitor labs, weight, PO intake.

## 2018-04-04 NOTE — PROGRESS NOTE ADULT - SUBJECTIVE AND OBJECTIVE BOX
HPI: Pt seen and examined this am in follow up for sx. Pt sleepy, but easily wakes up. Denies pain or discomfort. Endorses hunger for lunch. Remains mildly confused, but improved as per RN. Nursing team noted pt worked with PT today.       PAIN: denies  DYSPNEA: denies      ROS:    Fatigue- yes, mod  Weakness- yes, mod    All other systems reviewed and negative    PHYSICAL EXAM:    Vital Signs Last 24 Hrs  T(C): 36.2 (2018 04:22), Max: 36.6 (2018 13:12)  T(F): 97.1 (2018 04:22), Max: 97.8 (2018 13:12)  HR: 90 (2018 04:22) (90 - 102)  BP: 103/51 (2018 04:22) (95/46 - 104/56)  BP(mean): --  RR: 18 (2018 04:22) (18 - 18)  SpO2: 97% (2018 04:22) (96% - 99%)  Daily     Daily Weight in k.4 (2018 09:35)    PPSV2: 20-30  %    General: Elderly female, cooperative, NAD  Mental Status: AOx2 (not time or circumstance)  HEENT:  perrl, eomi, +temporal wasting  Lungs: clear  Cardiac: +s1 s2 rrr  GI: soft nt nd +bs  : voids  Ext: R arm in sling, no edema, rom without pain in LE  Neuro: weakness in LE due to poor effort 4/5 bl LE, otherwise no focal deficits    LABS:                        9.6    2.71  )-----------( 80       ( 2018 05:34 )             29.0         131<L>  |  96  |  17  ----------------------------<  158<H>  3.9   |  28  |  0.24<L>    Ca    7.9<L>      2018 05:34        Albumin: Albumin, Serum: 2.5 g/dL ( @ 06:33)      Allergies    No Known Allergies    Intolerances      MEDICATIONS  (STANDING):  aspirin enteric coated 81 milliGRAM(s) Oral daily  dexamethasone     Tablet 4 milliGRAM(s) Oral three times a day  dextrose 5%. 1000 milliLiter(s) (50 mL/Hr) IV Continuous <Continuous>  dextrose 50% Injectable 12.5 Gram(s) IV Push once  dextrose 50% Injectable 25 Gram(s) IV Push once  dextrose 50% Injectable 25 Gram(s) IV Push once  docusate sodium 100 milliGRAM(s) Oral three times a day  insulin glargine Injectable (LANTUS) 5 Unit(s) SubCutaneous at bedtime  insulin lispro (HumaLOG) corrective regimen sliding scale   SubCutaneous three times a day before meals    MEDICATIONS  (PRN):  acetaminophen   Tablet 650 milliGRAM(s) Oral every 6 hours PRN mild to moderate pain  dextrose Gel 1 Dose(s) Oral once PRN Blood Glucose LESS THAN 70 milliGRAM(s)/deciliter  glucagon  Injectable 1 milliGRAM(s) IntraMuscular once PRN Glucose LESS THAN 70 milligrams/deciliter  oxyCODONE    5 mG/acetaminophen 325 mG 1 Tablet(s) Oral every 6 hours PRN Severe Pain (7 - 10)      RADIOLOGY:

## 2018-04-04 NOTE — DIETITIAN INITIAL EVALUATION ADULT. - NS AS NUTRI DX NUTRIENT
Malnutrition/Increased nutrient needs (specify)/protein, Pt meets criteria for severe protein-calorie malnutrition in context of chronic disease

## 2018-04-05 LAB
CULTURE RESULTS: SIGNIFICANT CHANGE UP
GLUCOSE BLDC GLUCOMTR-MCNC: 141 MG/DL — HIGH (ref 70–99)
GLUCOSE BLDC GLUCOMTR-MCNC: 149 MG/DL — HIGH (ref 70–99)
GLUCOSE BLDC GLUCOMTR-MCNC: 324 MG/DL — HIGH (ref 70–99)
GLUCOSE BLDC GLUCOMTR-MCNC: 337 MG/DL — HIGH (ref 70–99)
SPECIMEN SOURCE: SIGNIFICANT CHANGE UP

## 2018-04-05 RX ADMIN — Medication 100 MILLIGRAM(S): at 22:04

## 2018-04-05 RX ADMIN — Medication 4 MILLIGRAM(S): at 22:04

## 2018-04-05 RX ADMIN — Medication 4: at 17:24

## 2018-04-05 RX ADMIN — Medication 81 MILLIGRAM(S): at 15:04

## 2018-04-05 RX ADMIN — OXYCODONE AND ACETAMINOPHEN 1 TABLET(S): 5; 325 TABLET ORAL at 23:00

## 2018-04-05 RX ADMIN — INSULIN GLARGINE 5 UNIT(S): 100 INJECTION, SOLUTION SUBCUTANEOUS at 22:04

## 2018-04-05 RX ADMIN — Medication 100 MILLIGRAM(S): at 15:04

## 2018-04-05 RX ADMIN — Medication 4 MILLIGRAM(S): at 05:15

## 2018-04-05 RX ADMIN — OXYCODONE AND ACETAMINOPHEN 1 TABLET(S): 5; 325 TABLET ORAL at 22:21

## 2018-04-05 RX ADMIN — Medication 4 MILLIGRAM(S): at 15:04

## 2018-04-05 NOTE — PROGRESS NOTE ADULT - SUBJECTIVE AND OBJECTIVE BOX
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  Pt admitted w/ failure to thrive, high blood sugars, encephalopathy.  Seen early this morning.  Resting comfortably.  Responds no to most questions.      4/4- no overnight events.  Comfortable. No complaints.     4/5-     ros- limited due to confusion, as per hpi above    Vital Signs Last 24 Hrs  T(C): 36.3 (05 Apr 2018 04:10), Max: 36.3 (05 Apr 2018 04:10)  T(F): 97.3 (05 Apr 2018 04:10), Max: 97.3 (05 Apr 2018 04:10)  HR: 82 (05 Apr 2018 04:10) (81 - 102)  BP: 105/55 (05 Apr 2018 04:10) (105/55 - 144/71)  BP(mean): --  RR: 16 (05 Apr 2018 04:10) (16 - 16)  SpO2: 100% (05 Apr 2018 04:10) (97% - 100%)  T(C): 36.2 (04 Apr 2018 04:22), Max: 36.6 (03 Apr 2018 13:12)  T(F): 97.1 (04 Apr 2018 04:22), Max: 97.8 (03 Apr 2018 13:12)  HR: 90 (04 Apr 2018 04:22) (90 - 102)  BP: 103/51 (04 Apr 2018 04:22) (95/46 - 104/56)  BP(mean): --  RR: 18 (04 Apr 2018 04:22) (18 - 18)  SpO2: 97% (04 Apr 2018 04:22) (96% - 99%)    PHYSICAL EXAM:  General: ill appearing elderly female, lying in bed, nad; good O2 sats on 2LNC   Neuro: pleasantly confused; not following commands  HEENT: NCAT, MMM  Neck: Soft and supple  Respiratory: CTA b/l, no w/r/r  Cardiovascular: S1 and S2, RRR  Gastrointestinal: +BS, soft, NTND  Extremities: RUE ecchymosis; sling  Vascular: 2+ peripheral pulses  Musculoskeletal: moving extrem.           LABS: All Labs Reviewed:                        9.6    2.71  )-----------( 80       ( 04 Apr 2018 05:34 )             29.0     04-04    131<L>  |  96  |  17  ----------------------------<  158<H>  3.9   |  28  |  0.24<L>    Ca    7.9<L>      04 Apr 2018 05:34      Culture - Blood (03.30.18 @ 20:11)    Specimen Source: .Blood None    Culture Results:   No growth to date.        MEDICATIONS  (STANDING):  aspirin enteric coated 81 milliGRAM(s) Oral daily  dexamethasone     Tablet 4 milliGRAM(s) Oral three times a day  dextrose 5%. 1000 milliLiter(s) (50 mL/Hr) IV Continuous <Continuous>  dextrose 50% Injectable 12.5 Gram(s) IV Push once  dextrose 50% Injectable 25 Gram(s) IV Push once  dextrose 50% Injectable 25 Gram(s) IV Push once  docusate sodium 100 milliGRAM(s) Oral three times a day  insulin glargine Injectable (LANTUS) 5 Unit(s) SubCutaneous at bedtime  insulin lispro (HumaLOG) corrective regimen sliding scale   SubCutaneous three times a day before meals    MEDICATIONS  (PRN):  acetaminophen   Tablet 650 milliGRAM(s) Oral every 6 hours PRN mild to moderate pain  dextrose Gel 1 Dose(s) Oral once PRN Blood Glucose LESS THAN 70 milliGRAM(s)/deciliter  glucagon  Injectable 1 milliGRAM(s) IntraMuscular once PRN Glucose LESS THAN 70 milligrams/deciliter  oxyCODONE    5 mG/acetaminophen 325 mG 1 Tablet(s) Oral every 6 hours PRN Severe Pain (7 - 10)          ASSESSMENT and PLAN:  83y female w/     *acute encephalopathy due to brain mets, small cell lung cancer  - confusion improving   - s/p cyberknife radiosurgery 3/27 which she completed Monday  - no infectious cause found  - continue dexamethasone  - Rad/Onc f/u appreciated- outpt f/u   - Palliative f/u appreciated- guarded prognosis, DNR/I, limited MOLST    - Heme/Onc f/u appreciated- no plans for chemo or immunotherapy at this time     *pancytopenia  - in setting of above  - stable, monitor      *hyponatremia  - likely due to SIADH, lung cancer and decreased po intake    *T2D  - ISS, fingersticks    *fractures  pelvic and humerus since early march admission  - Ortho f/u appreciated- right arm sling, WBAT w/ assist, rolling walker  - PT eval appreciated- [facility]     *COPD  - O2 dependent, stable      *dvt px  - scds     4/4- PT re eval then possible [facility] today.   4/5- Pt is medically stable for d/c to [facility] if cleared by PT. 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  Pt admitted w/ failure to thrive, high blood sugars, encephalopathy.  Seen early this morning.  Resting comfortably.  Responds no to most questions.      4/4- no overnight events.  Comfortable. No complaints.     4/5- no complaints.  Lethargic but easily arousable.  States she doesn't want to get out of bed today but maybe tomorrow.  PT at bedside and helping patient who appears very weak even when attempting to sit at bedside.      ros- limited due to confusion, as per hpi above    Vital Signs Last 24 Hrs  T(C): 36.4 (05 Apr 2018 12:44), Max: 36.4 (05 Apr 2018 12:44)  T(F): 97.5 (05 Apr 2018 12:44), Max: 97.5 (05 Apr 2018 12:44)  HR: 82 (05 Apr 2018 12:44) (81 - 102)  BP: 101/50 (05 Apr 2018 12:44) (101/50 - 144/71)  BP(mean): --  RR: 18 (05 Apr 2018 12:44) (16 - 18)  SpO2: 96% (05 Apr 2018 12:44) (96% - 100%)  T(C): 36.3 (05 Apr 2018 04:10), Max: 36.3 (05 Apr 2018 04:10)  T(F): 97.3 (05 Apr 2018 04:10), Max: 97.3 (05 Apr 2018 04:10)  HR: 82 (05 Apr 2018 04:10) (81 - 102)  BP: 105/55 (05 Apr 2018 04:10) (105/55 - 144/71)      PHYSICAL EXAM:  General: ill appearing elderly female, lying in bed, nad; good O2 sats on 2LNC   Neuro: pleasantly confused; lethargic but arousable; follows some commands  HEENT: NCAT, MMM  Neck: Soft and supple  Respiratory: CTA b/l, no w/r/r  Cardiovascular: S1 and S2, RRR  Gastrointestinal: +BS, soft, NTND  Extremities: RUE ecchymosis; sling  Vascular: 2+ peripheral pulses  Musculoskeletal: moving extrem.           LABS: All Labs Reviewed:                        9.6    2.71  )-----------( 80       ( 04 Apr 2018 05:34 )             29.0     04-04    131<L>  |  96  |  17  ----------------------------<  158<H>  3.9   |  28  |  0.24<L>    Ca    7.9<L>      04 Apr 2018 05:34      Culture - Blood (03.30.18 @ 20:11)    Specimen Source: .Blood None    Culture Results:   No growth to date.        MEDICATIONS  (STANDING):  aspirin enteric coated 81 milliGRAM(s) Oral daily  dexamethasone     Tablet 4 milliGRAM(s) Oral three times a day  dextrose 5%. 1000 milliLiter(s) (50 mL/Hr) IV Continuous <Continuous>  dextrose 50% Injectable 12.5 Gram(s) IV Push once  dextrose 50% Injectable 25 Gram(s) IV Push once  dextrose 50% Injectable 25 Gram(s) IV Push once  docusate sodium 100 milliGRAM(s) Oral three times a day  insulin glargine Injectable (LANTUS) 5 Unit(s) SubCutaneous at bedtime  insulin lispro (HumaLOG) corrective regimen sliding scale   SubCutaneous three times a day before meals    MEDICATIONS  (PRN):  acetaminophen   Tablet 650 milliGRAM(s) Oral every 6 hours PRN mild to moderate pain  dextrose Gel 1 Dose(s) Oral once PRN Blood Glucose LESS THAN 70 milliGRAM(s)/deciliter  glucagon  Injectable 1 milliGRAM(s) IntraMuscular once PRN Glucose LESS THAN 70 milligrams/deciliter  oxyCODONE    5 mG/acetaminophen 325 mG 1 Tablet(s) Oral every 6 hours PRN Severe Pain (7 - 10)          ASSESSMENT and PLAN:  83y female w/     *acute encephalopathy due to brain mets, small cell lung cancer  - confusion improving   - s/p cyberknife radiosurgery 3/27 which she completed Monday  - no infectious cause found  - continue dexamethasone  - Rad/Onc f/u appreciated- outpt f/u   - Palliative f/u appreciated- guarded prognosis, DNR/I, limited MOLST    - Heme/Onc f/u appreciated- no plans for chemo or immunotherapy at this time     *pancytopenia  - in setting of above  - stable, monitor      *hyponatremia  - likely due to SIADH, lung cancer and decreased po intake    *T2D  - ISS, fingersticks    *fractures  pelvic and humerus since early march admission  - Ortho f/u appreciated- right arm sling, WBAT w/ assist, rolling walker  - PT eval appreciated- [facility]     *COPD  - O2 dependent, stable      *dvt px  - scds     4/5- Pt is medically stable for d/c to [facility] but not cleared by PT.  Social work, CM to discuss further placement w/ daughter. 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  Pt admitted w/ failure to thrive, high blood sugars, encephalopathy.  Seen early this morning.  Resting comfortably.  Responds no to most questions.      4/4- no overnight events.  Comfortable. No complaints.     4/5- no complaints.  Lethargic but easily arousable.        ros- limited due to confusion, as per hpi above    Vital Signs Last 24 Hrs  T(C): 36.4 (05 Apr 2018 12:44), Max: 36.4 (05 Apr 2018 12:44)  T(F): 97.5 (05 Apr 2018 12:44), Max: 97.5 (05 Apr 2018 12:44)  HR: 82 (05 Apr 2018 12:44) (81 - 102)  BP: 101/50 (05 Apr 2018 12:44) (101/50 - 144/71)  BP(mean): --  RR: 18 (05 Apr 2018 12:44) (16 - 18)  SpO2: 96% (05 Apr 2018 12:44) (96% - 100%)  T(C): 36.3 (05 Apr 2018 04:10), Max: 36.3 (05 Apr 2018 04:10)  T(F): 97.3 (05 Apr 2018 04:10), Max: 97.3 (05 Apr 2018 04:10)  HR: 82 (05 Apr 2018 04:10) (81 - 102)  BP: 105/55 (05 Apr 2018 04:10) (105/55 - 144/71)      PHYSICAL EXAM:  General: ill appearing elderly female, lying in bed, nad; good O2 sats on 2LNC   Neuro: pleasantly confused; lethargic but arousable; follows some commands  HEENT: NCAT, MMM  Neck: Soft and supple  Respiratory: CTA b/l, no w/r/r  Cardiovascular: S1 and S2, RRR  Gastrointestinal: +BS, soft, NTND  Extremities: RUE ecchymosis; sling  Vascular: 2+ peripheral pulses  Musculoskeletal: moving extrem.           LABS: All Labs Reviewed:                        9.6    2.71  )-----------( 80       ( 04 Apr 2018 05:34 )             29.0     04-04    131<L>  |  96  |  17  ----------------------------<  158<H>  3.9   |  28  |  0.24<L>    Ca    7.9<L>      04 Apr 2018 05:34      Culture - Blood (03.30.18 @ 20:11)    Specimen Source: .Blood None    Culture Results:   No growth to date.        MEDICATIONS  (STANDING):  aspirin enteric coated 81 milliGRAM(s) Oral daily  dexamethasone     Tablet 4 milliGRAM(s) Oral three times a day  dextrose 5%. 1000 milliLiter(s) (50 mL/Hr) IV Continuous <Continuous>  dextrose 50% Injectable 12.5 Gram(s) IV Push once  dextrose 50% Injectable 25 Gram(s) IV Push once  dextrose 50% Injectable 25 Gram(s) IV Push once  docusate sodium 100 milliGRAM(s) Oral three times a day  insulin glargine Injectable (LANTUS) 5 Unit(s) SubCutaneous at bedtime  insulin lispro (HumaLOG) corrective regimen sliding scale   SubCutaneous three times a day before meals    MEDICATIONS  (PRN):  acetaminophen   Tablet 650 milliGRAM(s) Oral every 6 hours PRN mild to moderate pain  dextrose Gel 1 Dose(s) Oral once PRN Blood Glucose LESS THAN 70 milliGRAM(s)/deciliter  glucagon  Injectable 1 milliGRAM(s) IntraMuscular once PRN Glucose LESS THAN 70 milligrams/deciliter  oxyCODONE    5 mG/acetaminophen 325 mG 1 Tablet(s) Oral every 6 hours PRN Severe Pain (7 - 10)          ASSESSMENT and PLAN:  83y female w/     *acute encephalopathy due to brain mets, small cell lung cancer  - confusion improving   - s/p cyberknife radiosurgery 3/27 which she completed Monday  - no infectious cause found  - continue dexamethasone  - Rad/Onc f/u appreciated- outpt f/u   - Palliative f/u appreciated- guarded prognosis, DNR/I, limited MOLST    - Heme/Onc f/u appreciated- no plans for chemo or immunotherapy at this time     *pancytopenia  - in setting of above  - stable, monitor      *hyponatremia  - likely due to SIADH, lung cancer and decreased po intake    *T2D  - ISS, fingersticks    *fractures  pelvic and humerus since early march admission  - Ortho f/u appreciated- right arm sling, WBAT w/ assist, rolling walker  - PT eval appreciated- [facility]     *COPD  - O2 dependent, stable      *dvt px  - scds     4/5- Patient is medically stable for discharge.  She will not be receiving chemo or radiation therapy while at rehab.

## 2018-04-05 NOTE — PROGRESS NOTE ADULT - SUBJECTIVE AND OBJECTIVE BOX
HPI: Pt seen and examined this am in follow up for sx and GOC. Pt lethargic only able to say "yea," falls right back to sleep. As per RN pt too weak to work with PT in meaningful way today with postural instability and no longer a [facility] candidate as a result.       PAIN: no non-verbal signs of pain  DYSPNEA: no non-verbal signs of dyspnea      ROS:    Unable to gather 2/2 to lethargy      PHYSICAL EXAM:    Vital Signs Last 24 Hrs  T(C): 36.3 (05 Apr 2018 04:10), Max: 36.3 (05 Apr 2018 04:10)  T(F): 97.3 (05 Apr 2018 04:10), Max: 97.3 (05 Apr 2018 04:10)  HR: 82 (05 Apr 2018 04:10) (81 - 102)  BP: 105/55 (05 Apr 2018 04:10) (105/55 - 144/71)  BP(mean): --  RR: 16 (05 Apr 2018 04:10) (16 - 16)  SpO2: 100% (05 Apr 2018 04:10) (97% - 100%)  Daily     Daily     PPSV2: 20-30  %    General: Elderly female, lethargic, NAD  HEENT:  perrl, eomi, +temporal wasting  Lungs: clear  Cardiac: +s1 s2 rrr  GI: soft nt nd +bs  : voids  Ext: R arm in sling, no edema, rom without pain in LE  Neuro: weakness in LE due to poor effort 4/5 bl LE, otherwise no focal deficits    LABS:                        9.6    2.71  )-----------( 80       ( 04 Apr 2018 05:34 )             29.0     04-04    131<L>  |  96  |  17  ----------------------------<  158<H>  3.9   |  28  |  0.24<L>    Ca    7.9<L>      04 Apr 2018 05:34        Albumin: Albumin, Serum: 2.5 g/dL (03-30 @ 06:33)      Allergies    No Known Allergies    Intolerances      MEDICATIONS  (STANDING):  aspirin enteric coated 81 milliGRAM(s) Oral daily  dexamethasone     Tablet 4 milliGRAM(s) Oral three times a day  dextrose 5%. 1000 milliLiter(s) (50 mL/Hr) IV Continuous <Continuous>  dextrose 50% Injectable 12.5 Gram(s) IV Push once  dextrose 50% Injectable 25 Gram(s) IV Push once  dextrose 50% Injectable 25 Gram(s) IV Push once  docusate sodium 100 milliGRAM(s) Oral three times a day  insulin glargine Injectable (LANTUS) 5 Unit(s) SubCutaneous at bedtime  insulin lispro (HumaLOG) corrective regimen sliding scale   SubCutaneous three times a day before meals    MEDICATIONS  (PRN):  acetaminophen   Tablet 650 milliGRAM(s) Oral every 6 hours PRN mild to moderate pain  dextrose Gel 1 Dose(s) Oral once PRN Blood Glucose LESS THAN 70 milliGRAM(s)/deciliter  glucagon  Injectable 1 milliGRAM(s) IntraMuscular once PRN Glucose LESS THAN 70 milligrams/deciliter  oxyCODONE    5 mG/acetaminophen 325 mG 1 Tablet(s) Oral every 6 hours PRN Severe Pain (7 - 10)      RADIOLOGY:

## 2018-04-06 VITALS
TEMPERATURE: 98 F | OXYGEN SATURATION: 100 % | HEART RATE: 82 BPM | SYSTOLIC BLOOD PRESSURE: 117 MMHG | RESPIRATION RATE: 16 BRPM | DIASTOLIC BLOOD PRESSURE: 58 MMHG

## 2018-04-06 LAB
GLUCOSE BLDC GLUCOMTR-MCNC: 204 MG/DL — HIGH (ref 70–99)
GLUCOSE BLDC GLUCOMTR-MCNC: 295 MG/DL — HIGH (ref 70–99)

## 2018-04-06 RX ADMIN — Medication 100 MILLIGRAM(S): at 05:25

## 2018-04-06 RX ADMIN — Medication 4 MILLIGRAM(S): at 14:43

## 2018-04-06 RX ADMIN — Medication 4 MILLIGRAM(S): at 05:25

## 2018-04-06 RX ADMIN — Medication 100 MILLIGRAM(S): at 14:43

## 2018-04-06 RX ADMIN — Medication 3: at 11:53

## 2018-04-06 RX ADMIN — Medication 81 MILLIGRAM(S): at 11:53

## 2018-04-06 RX ADMIN — OXYCODONE AND ACETAMINOPHEN 1 TABLET(S): 5; 325 TABLET ORAL at 09:54

## 2018-04-06 NOTE — PROGRESS NOTE ADULT - SUBJECTIVE AND OBJECTIVE BOX
HPI: Pt seen and examined this am in follow up for sx. Pt asleep, but awakens. Denies pain or discomfort, just wants to sleep. Notes eating breakfast and enjoying it. No other issues.     Spoke with  team who is awaiting acceptance decisions from [facility], already having one from [facility]. DC pending these results.      PAIN: denies  DYSPNEA: denies      ROS:    Fatigue- yes, mod  Weakness- yes, mod    All other systems reviewed and negative    PHYSICAL EXAM:    Vital Signs Last 24 Hrs  T(C): 36.4 (06 Apr 2018 05:30), Max: 36.4 (05 Apr 2018 12:44)  T(F): 97.5 (06 Apr 2018 05:30), Max: 97.5 (05 Apr 2018 12:44)  HR: 78 (06 Apr 2018 05:30) (78 - 82)  BP: 108/54 (06 Apr 2018 05:30) (101/50 - 108/54)  BP(mean): --  RR: 18 (06 Apr 2018 05:30) (18 - 18)  SpO2: 100% (06 Apr 2018 05:30) (96% - 100%)  Daily     Daily     PPSV2: 20-30  %    General: Elderly female, tired, NAD  Mental Status: AOx2 (not time or circumstance)  HEENT:  perrl, eomi, +temporal wasting  Lungs: clear  Cardiac: +s1 s2 rrr  GI: soft nt nd +bs  : voids  Ext: R arm in sling, no edema, rom without pain in LE  Neuro: weakness in LE due to poor effort 4/5 bl LE, otherwise no focal deficits    LABS:            Albumin:     Allergies    No Known Allergies    Intolerances      MEDICATIONS  (STANDING):  aspirin enteric coated 81 milliGRAM(s) Oral daily  dexamethasone     Tablet 4 milliGRAM(s) Oral three times a day  dextrose 5%. 1000 milliLiter(s) (50 mL/Hr) IV Continuous <Continuous>  dextrose 50% Injectable 12.5 Gram(s) IV Push once  dextrose 50% Injectable 25 Gram(s) IV Push once  dextrose 50% Injectable 25 Gram(s) IV Push once  docusate sodium 100 milliGRAM(s) Oral three times a day  insulin glargine Injectable (LANTUS) 5 Unit(s) SubCutaneous at bedtime  insulin lispro (HumaLOG) corrective regimen sliding scale   SubCutaneous three times a day before meals    MEDICATIONS  (PRN):  acetaminophen   Tablet 650 milliGRAM(s) Oral every 6 hours PRN mild to moderate pain  dextrose Gel 1 Dose(s) Oral once PRN Blood Glucose LESS THAN 70 milliGRAM(s)/deciliter  glucagon  Injectable 1 milliGRAM(s) IntraMuscular once PRN Glucose LESS THAN 70 milligrams/deciliter  oxyCODONE    5 mG/acetaminophen 325 mG 1 Tablet(s) Oral every 6 hours PRN Severe Pain (7 - 10)      RADIOLOGY:

## 2018-04-06 NOTE — PROGRESS NOTE ADULT - PROVIDER SPECIALTY LIST ADULT
Hospitalist
Palliative Care
Hospitalist
Rad Onc

## 2018-04-06 NOTE — PROGRESS NOTE ADULT - ASSESSMENT
84 yo F with small cell lung cancer. Recent PET negative and brain MRI reveals two new brain mets treated with Cyberknife radiosurgery.  Pt clinically doing poorly despite status of cancer. Palliative consult recommended.
83y old Female coming from home with hx of COPD on home O2 (4L), CHF (EF 20-25%), DM2 and metastatic Lung Cancer (Stage IV) dx 9/16 tx with carboplatin w/ excellent response and prophylactic whole brain xrt. This was followed by progression of dz in chest in Aug 2017 tx w/ carbo/vp16 with excellent response as well and recent PET in Feb 2018 w/ no progression. Pt also recently admitted 3/6 for mechanical fall at home w/ CXR showing nondisplaced R humoral fracture, CT pelvis showing nondisplaced fracture of right inferior and superior rami w/ associated hematoma of R internal obturator muscle, and lastly CTH showing L centrum seminovale/radiata mass and R frontal lesion with some vasogenic edema representing brain mets. No surgical intervention needed for fractures. Now s/p cyberknife w/ Dr. Osorio for mets, finished tx on 3/26, with worsening lethargy since. Pt now admitted on 3/29 for BG>500 with PMD. Found here to have mildly elevated WBC (improved from 14-->11), CXR normal. thrombocytopenia shoulder xray still showing nondisplaced humeral head fracture, and normal ABG, with no sign of infection or other cause for lethargy.  Palliative care consulted to assist with establishing GOC, pt known to our service from last admission.    1) AMS/lethargy  - BG>500 initially, initially appeared to be improving, but now sleeping majority of day with poor energy  - no other causes found, i.e. no sign of infection etc.  - may also be 2/2 to radiation treatment    2) Pain  - continues to deny  - 2/2 to acute fractures  - on percocet at home, without needing to use this since admission  - add Tylenol prn mild-mod pain  - c/w percocet prn severe pain  - also c/w steroids as they are likely helping both intracranial process and bone pain. Tapering schedule/calendar as per RT placed on chart  - As per Dr. Osorio important to dc on same steroid dose and allow Dr. Osorio to taper along with treatment.  - will continue to monitor    3) Fractures   - ortho notes appreciated   - right arm in sling and WBAT w/ assist and rolling walker  - no surgical intervention otherwise recommended  - PT here and ortho follow up outpt    4) Thrombocytopenia  - no active bleed thus far  - c/w monitoring    5) Stage IV lung ca with mets to brain  - Pt did very well with regimens in the past and currently tolerated cyberknife  - [facility] notes appreciated  - c/w steroid taper  - recent PET also negative for further progression  - as per daughter, last conversation with Dr. Waller noted intent for immunotherapy if disease recurred  - this will depend on functional status, as lethargy is biggest concern currently. Seems less likely given pt with poor functional status    6) Debility  - needing aid support at home since last admission  - PPS<40%  - some signs of inability to maintain adequate intake as well, with poor po intake  - PT eval completed, pt intermittently uncooperative, and when cooperating pt too weak to be a good candidate for TRACY as per PT team    7) Prognosis  - guarded  - pt with waning function, lethargy, poor nutrition, has COPD, O2-dependent and CHF with poor EF at baseline, and stage Iv lung ca with mets to brain though did well with good responses to tx in past. Prognosis now appears poor given functional status has continued to wean and pt not even candidate for TRACY. Would likely fit criteria for hospice if family on board with comfort focus.     8) GOC/Advanced Directives  - pt has waxing/waning capacity for decision making  - HCP completed on previous admission- 1)daughter Ashia Vega [phone], 2) Nijuana Welsh [phone]  - DNR and DNI, limited MOLST also completed on last admission, reviewed and kept the same  - GOC meeting held 3/30- daughter would like to give pt a chance to improve, with PT while here and hopefully TRACY after. She is "not ready to throw in the towel." However, sit seems that pt no longer a TRACY candidate as per team, thus plan needs to be revised accordingly either to home with aids +home pall (to continue to pursue onc tx options, though this also seems less likely due to poor functional status) or transition to comfort focus with home hospice. D/w team who noted SW intent to reach out to daughter to re-review these options. Offered support with this if needed as we have had conversations with daughter in the past.     Thank you for including us in Ms. Tavares's care. Will continue to follow    Chris Fairbanks MD  Palliative Care Attending
83y old Female coming from home with hx of COPD on home O2 (4L), CHF (EF 20-25%), DM2 and metastatic Lung Cancer (Stage IV) dx 9/16 tx with carboplatin w/ excellent response and prophylactic whole brain xrt. This was followed by progression of dz in chest in Aug 2017 tx w/ carbo/vp16 with excellent response as well and recent PET in Feb 2018 w/ no progression. Pt also recently admitted 3/6 for mechanical fall at home w/ CXR showing nondisplaced R humoral fracture, CT pelvis showing nondisplaced fracture of right inferior and superior rami w/ associated hematoma of R internal obturator muscle, and lastly CTH showing L centrum seminovale/radiata mass and R frontal lesion with some vasogenic edema representing brain mets. No surgical intervention needed for fractures. Now s/p cyberknife w/ Dr. Osorio for mets, finished tx on 3/26, with worsening lethargy since. Pt now admitted on 3/29 for BG>500 with PMD. Found here to have mildly elevated WBC (improved from 14-->11), CXR normal. thrombocytopenia shoulder xray still showing nondisplaced humeral head fracture, and normal ABG, with no sign of infection or other cause for lethargy.  Palliative care consulted to assist with establishing GOC, pt known to our service from last admission.    1) AMS/lethargy  - BG>500 initially, initially appeared to be improving, but now sleeping majority of day with poor energy  - no other causes found, i.e. no sign of infection etc.  - may also be 2/2 to radiation treatment    2) Pain  - continues to deny  - 2/2 to acute fractures  - on percocet at home, without needing to use this since admission  - add Tylenol prn mild-mod pain  - c/w percocet prn severe pain  - also c/w steroids as they are likely helping both intracranial process and bone pain. Tapering schedule/calendar as per RT placed on chart  - As per Dr. Osorio important to dc on same steroid dose and allow Dr. Osorio to taper along with treatment.  - will continue to monitor    3) Fractures   - ortho notes appreciated   - right arm in sling and WBAT w/ assist and rolling walker  - no surgical intervention otherwise recommended  - PT here and ortho follow up outpt    4) Thrombocytopenia  - no active bleed thus far  - c/w monitoring    5) Stage IV lung ca with mets to brain  - Pt did very well with regimens in the past and currently tolerated cyberknife  - [facility] notes appreciated  - c/w steroid taper  - recent PET also negative for further progression  - as per daughter, last conversation with Dr. Waller noted intent for immunotherapy if disease recurred  - this will depend on functional status, as lethargy is biggest concern currently. Seems less likely given pt with poor functional status    6) Debility  - needing aid support at home since last admission  - PPS<40%  - some signs of inability to maintain adequate intake as well, with poor po intake  - PT eval completed, pt intermittently uncooperative, and when cooperating pt too weak to be a good candidate for TRACY as per PT team    7) Prognosis  - guarded  - pt with waning function, lethargy, poor nutrition, has COPD, O2-dependent and CHF with poor EF at baseline, and stage Iv lung ca with mets to brain though did well with good responses to tx in past. Prognosis now appears poor given functional status has continued to wean and pt not even candidate for TRACY. Would likely fit criteria for hospice if family on board with comfort focus.     8) GOC/Advanced Directives  - pt has waxing/waning capacity for decision making  - HCP completed on previous admission- 1)daughter Ashia Vega [phone], 2) Niece Lamar Welsh [phone]  - DNR and DNI, limited MOLST also completed on last admission, reviewed and kept the same  - GOC meeting held 3/30- daughter would like to give pt a chance to improve, with PT while here and hopefully TRACY after. She is "not ready to throw in the towel." D/w team pt being accepted to [facility], but [facility] is first choice, awaiting this decision, but seems in alignment with daughters previously noted wishes. Pt has good days and bad days and ultimately it is clear that family wants to try TRACY to have peace of mind that they have given pt best chance to improve possible.     *Sx managed and GOC clear, with defined dc plan in progress. Will sign off. Please recall if palliative issues recur. D/w Dr. Nicholson*  Thank you for including us in Ms. Tavares's care.     Chris Fairbanks MD  Palliative Care Attending
83y old Female coming from home with hx of COPD on home O2 (4L), CHF (EF 20-25%), DM2 and metastatic Lung Cancer (Stage IV) dx 9/16 tx with carboplatin w/ excellent response and prophylactic whole brain xrt. This was followed by progression of dz in chest in Aug 2017 tx w/ carbo/vp16 with excellent response as well and recent PET in Feb 2018 w/ no progression. Pt also recently admitted 3/6 for mechanical fall at home w/ CXR showing nondisplaced R humoral fracture, CT pelvis showing nondisplaced fracture of right inferior and superior rami w/ associated hematoma of R internal obturator muscle, and lastly CTH showing L centrum seminovale/radiata mass and R frontal lesion with some vasogenic edema representing brain mets. No surgical intervention needed for fractures. Now s/p cyberknife w/ Dr. Osorio for mets, finished tx on 3/26, with worsening lethargy since. Pt now admitted on 3/29 for BG>500 with PMD. Found here to have mildly elevated WBC (improved from 14-->11), CXR normal. thrombocytopenia shoulder xray still showing nondisplaced humeral head fracture, and normal ABG, with no sign of infection or other cause for lethargy.  Palliative care consulted to assist with establishing GOC, pt known to our service from last admission.    1) AMS/lethargy  - BG>500 initially, now improved  - still with lethargy now  - no other causes found, i.e. no sign of infection etc.  - may also be 2/2 to radiation treatment  - need to give some time to see how pt improves  - PT evaluated pt, plan for TRACY    2) Pain  - continues to deny  - 2/2 to acute fractures  - on percocet at home, without needing to use this since admission  - add Tylenol prn mild-mod pain  - c/w percocet prn severe pain  - also c/w steroids as they are likely helping both intracranial process and bone pain. Tapering schedule/calendar as per RT placed on chart  - As per Dr. Osorio important to dc on same steroid dose and allow Dr. Osorio to taper along with treatment.  - will continue to monitor    3) Fractures   - ortho notes appreciated   - right arm in sling and WBAT w/ assist and rolling walker  - no surgical intervention otherwise recommended  - PT here and ortho follow up outpt    4) Thrombocytopenia  - no active bleed thus far  - c/w monitoring    5) Stage IV lung ca with mets to brain  - Pt did very well with regimens in the past and currently tolerated cyberknife  - [facility] notes appreciated  - c/w steroid taper  - recent PET also negative for further progression  - as per daughter, last conversation with Dr. Waller noted intent for immunotherapy if disease recurred  - this will depend on functional status, as lethargy is biggest concern currently    6) Debility  - needing aid support at home since last admission  - PPS<40%  - some signs of inability to maintain adequate intake as well, with poor po intake  - PT eval completed, pt worked with them, rec TRACY (full note pending)    7) Prognosis  - guarded  - pt with waning function, lethargy, poor nutrition, has COPD, O2-dependent and CHF with poor EF at baseline, and stage Iv lung ca with mets to brain though did well with good responses to tx in past. Prognosis will depend on how/if she improves with functional status    8) GOC/Advanced Directives  - pt has waxing/waning capacity for decision making  - HCP completed on previous admission- 1)daughter Ashia Vega [phone], 2) Niece Lamar Welsh [phone]  - DNR and DNI, limited MOLST also completed on last admission, reviewed and kept the same  - GOC meeting held 3/30- daughter would like to give pt a chance to improve, with PT while here and hopefully TRACY after. She is "not ready to throw in the towel." However, she is also understanding that we will need to have further conversation about prognosis if she does not improve. Pt appears more awake today and cooperative, and worked with PT, it is clear family would want TRACY with MOLST decisions already reviewed and maintained.     Thank you for including us in Ms. Tavares's care. Will continue to follow    Chris Fairbanks MD  Palliative Care Attending
83y old Female coming from home with hx of COPD on home O2 (4L), CHF (EF 20-25%), DM2 and metastatic Lung Cancer (Stage IV) dx 9/16 tx with carboplatin w/ excellent response and prophylactic whole brain xrt. This was followed by progression of dz in chest in Aug 2017 tx w/ carbo/vp16 with excellent response as well and recent PET in Feb 2018 w/ no progression. Pt also recently admitted 3/6 for mechanical fall at home w/ CXR showing nondisplaced R humoral fracture, CT pelvis showing nondisplaced fracture of right inferior and superior rami w/ associated hematoma of R internal obturator muscle, and lastly CTH showing L centrum seminovale/radiata mass and R frontal lesion with some vasogenic edema representing brain mets. No surgical intervention needed for fractures. Now s/p cyberknife w/ Dr. Osorio for mets, finished tx on 3/26, with worsening lethargy since. Pt now admitted on 3/29 for BG>500 with PMD. Found here to have mildly elevated WBC (improved from 14-->11), CXR normal. thrombocytopenia shoulder xray still showing nondisplaced humeral head fracture, and normal ABG, with no sign of infection or other cause for lethargy.  Palliative care consulted to assist with establishing GOC, pt known to our service from last admission.    1) AMS/lethargy  - BG>500 initially, now improved  - still with lethargy now  - no other causes found, i.e. no sign of infection etc.  - may also be 2/2 to radiation treatment  - need to give some time to see how pt improves  - PT evaluated pt, plan to return over the weekend, agree with this and TRACY if pt is able to cooperate    2) Pain  - denies today, but noted earlier while sitting in chair  - 2/2 to acute fractures  - on percocet at home, without needing to use this since admission  - add Tylenol prn mild-mod pain  - c/w percocet prn severe pain  - also c/w steroids as they are likely helping both intracranial process and bone pain  - As per Dr. Osorio important to dc on same steroid dose and allow Dr. Osorio to taper along with treatment.  - will continue to monitor    3) Fractures  - ortho notes appreciated   - right arm in sling and WBAT w/ assist and rolling walker  - no surgical intervention otherwise recommended  - PT here and ortho follow up outpt    4) Thrombocytopenia  - no active bleed thus far  - c/w monitoring    5) Stage IV lung ca with mets to brain  - Pt did very well with regimens in the past and currently tolerated cyberknife  - [facility] notes appreciated  - c/w steroid taper  - recent PET also negative for further progression  - as per daughter, last conversation with Dr. 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 Prognosis will depend on how/if she improves with functional status    8) GOC/Advanced Directives  - pt has waxing/waning capacity for decision making  - HCP completed on previous admission- 1)daughter Ashia Vega [phone], 2) Nijuana Welsh [phone]  - DNR and DNI, limited MOLST also completed on last admission, reviewed and kept the same today  - GOC meeting held 3/30- daughter would like to give pt a chance to improve, with PT while here and hopefully TRACY after. She is "not ready to throw in the towel." However, she is also understanding that we will need to have further conversation about prognosis if she does not improve in next few days, plan to touch base again on Monday.     Thank you for including us in Ms. Tavares's care. Will continue to follow    Chris Fairbanks MD  Palliative Care Attending
83y old Female coming from home with hx of COPD on home O2 (4L), CHF (EF 20-25%), DM2 and metastatic Lung Cancer (Stage IV) dx 9/16 tx with carboplatin w/ excellent response and prophylactic whole brain xrt. This was followed by progression of dz in chest in Aug 2017 tx w/ carbo/vp16 with excellent response as well and recent PET in Feb 2018 w/ no progression. Pt also recently admitted 3/6 for mechanical fall at home w/ CXR showing nondisplaced R humoral fracture, CT pelvis showing nondisplaced fracture of right inferior and superior rami w/ associated hematoma of R internal obturator muscle, and lastly CTH showing L centrum seminovale/radiata mass and R frontal lesion with some vasogenic edema representing brain mets. No surgical intervention needed for fractures. Now s/p cyberknife w/ Dr. Osorio for mets, finished tx on 3/26, with worsening lethargy since. Pt now admitted on 3/29 for BG>500 with PMD. Found here to have mildly elevated WBC (improved from 14-->11), CXR normal. thrombocytopenia shoulder xray still showing nondisplaced humeral head fracture, and normal ABG, with no sign of infection or other cause for lethargy.  Palliative care consulted to assist with establishing GOC, pt known to our service from last admission.    1) AMS/lethargy  - BG>500 initially, now improved  - still with lethargy now  - no other causes found, i.e. no sign of infection etc.  - may also be 2/2 to radiation treatment  - need to give some time to see how pt improves  - PT evaluated pt, plan to return over the weekend, agree with this and TRACY if pt is able to cooperate    2) Pain  - denies today, but noted earlier while sitting in chair  - 2/2 to acute fractures  - on percocet at home, without needing to use this since admission  - add Tylenol prn mild-mod pain  - c/w percocet prn severe pain  - also c/w steroids as they are likely helping both intracranial process and bone pain. Tapering schedule/calendar as per RT placed on chart  - As per Dr. Osorio important to dc on same steroid dose and allow Dr. Osorio to taper along with treatment.  - will continue to monitor    3) Fractures   - ortho notes appreciated   - right arm in sling and WBAT w/ assist and rolling walker  - no surgical intervention otherwise recommended  - PT here and ortho follow up outpt    4) Thrombocytopenia  - no active bleed thus far  - c/w monitoring    5) Stage IV lung ca with mets to brain  - Pt did very well with regimens in the past and currently tolerated cyberknife  - [facility] notes appreciated  - c/w steroid taper  - recent PET also negative for further progression  - as per daughter, last conversation with Dr. 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 Prognosis will depend on how/if she improves with functional status    8) GOC/Advanced Directives  - pt has waxing/waning capacity for decision making  - HCP completed on previous admission- 1)daughter Ashia Vega [phone], 2) Dave Welsh [phone]  - DNR and DNI, limited MOLST also completed on last admission, reviewed and kept the same  - GOC meeting held 3/30- daughter would like to give pt a chance to improve, with PT while here and hopefully TRACY after. She is "not ready to throw in the towel." However, she is also understanding that we will need to have further conversation about prognosis if she does not improve in next few days. Pt appears more awake today and cooperative, team ordering PT reeval and if recommended, it is clear family would want TRACY with MOLST decisions already reviewed and maintained.     Thank you for including us in Ms. Tavares's care. Will continue to follow    Chris Fairbanks MD  Palliative Care Attending
83y old Female coming from home with hx of COPD on home O2 (4L), CHF (EF 20-25%), DM2 and metastatic Lung Cancer (Stage IV) dx 9/16 tx with carboplatin w/ excellent response and prophylactic whole brain xrt. This was followed by progression of dz in chest in Aug 2017 tx w/ carbo/vp16 with excellent response as well and recent PET in Feb 2018 w/ no progression. Pt also recently admitted 3/6 for mechanical fall at home w/ CXR showing nondisplaced R humoral fracture, CT pelvis showing nondisplaced fracture of right inferior and superior rami w/ associated hematoma of R internal obturator muscle, and lastly CTH showing L centrum seminovale/radiata mass and R frontal lesion with some vasogenic edema representing brain mets. No surgical intervention needed for fractures. Now s/p cyberknife w/ Dr. Osorio for mets, finished tx on 3/26, with worsening lethargy since. Pt now admitted on 3/29 for BG>500 with PMD. Found here to have mildly elevated WBC (improved from 14-->11), CXR normal. thrombocytopenia shoulder xray still showing nondisplaced humeral head fracture, and normal ABG, with no sign of infection or other cause for lethargy.  Palliative care consulted to assist with establishing GOC, pt known to our service from last admission.    1) AMS/lethargy  - BG>500 initially, now improved  - still with lethargy now  - no other causes found, i.e. no sign of infection etc.  - may also be 2/2 to radiation treatment  - need to give some time to see how pt improves  - PT evaluated pt, plan to return over the weekend, agree with this and TRACY if pt is able to cooperate    2) Pain  - denies today, but noted earlier while sitting in chair  - 2/2 to acute fractures  - on percocet at home, without needing to use this since admission  - add Tylenol prn mild-mod pain  - c/w percocet prn severe pain  - also c/w steroids as they are likely helping both intracranial process and bone pain. Tapering schedule/calendar as per RT placed on chart  - As per Dr. Osorio important to dc on same steroid dose and allow Dr. Osorio to taper along with treatment.  - will continue to monitor    3) Fractures  - ortho notes appreciated   - right arm in sling and WBAT w/ assist and rolling walker  - no surgical intervention otherwise recommended  - PT here and ortho follow up outpt    4) Thrombocytopenia  - no active bleed thus far  - c/w monitoring    5) Stage IV lung ca with mets to brain  - Pt did very well with regimens in the past and currently tolerated cyberknife  - [facility] notes appreciated  - c/w steroid taper  - recent PET also negative for further progression  - as per daughter, last conversation with Dr. 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 Prognosis will depend on how/if she improves with functional status    8) GOC/Advanced Directives  - pt has waxing/waning capacity for decision making  - HCP completed on previous admission- 1)daughter Ashia Vega [phone], 2) Dave Welsh [phone]  - DNR and DNI, limited MOLST also completed on last admission, reviewed and kept the same  - GOC meeting held 3/30- daughter would like to give pt a chance to improve, with PT while here and hopefully TRACY after. She is "not ready to throw in the towel." However, she is also understanding that we will need to have further conversation about prognosis if she does not improve in next few days. Pt appears more awake today and cooperative, team ordering PT reeval and if recommended, it is clear family would want TRACY with MOLST decisions already reviewed and maintained.     Thank you for including us in Ms. Tavares's care. Will continue to follow    Chris Fairbanks MD  Palliative Care Attending

## 2018-04-06 NOTE — CHART NOTE - NSCHARTNOTEFT_GEN_A_CORE
Assessment:   Pt has fair PO intake, eats approximately half her meals.  Unknown if she is drinking supplement  Pt does not want to talk about her food  Pt to be dc'ed to rehab shortly    Diet Prescription: Diet, Consistent Carbohydrate w/Evening Snack (03-29-18 @ 20:24)    Estimated Energy Needs (calories/kg):  · Weight Used for Calculation  current    Estimated Energy Needs (30-35 calories/kg):  · Weight  (lbs)  119.9 lb  · Weight (kg)  54.4 kg  · From (30 debra/kg)  1632  · To (35 calories/kg)  1904    Other Calculation:  · Other Calculation  Ht.   64   "        Wt.  54.4      kg               BMI  20.6                IBW  54  kg               Pt is at 100   %  IBW    Estimated Protein Needs (1.6-1.8 gm/kg):  · Weight  (lbs)  119  · Weight (kg)  54 kg  · From (1.6 g/kg)  86.4  · To (1.8 g/kg)  97.2    Estimated Fluid Needs (30-35 ml/kg):  · Weight  (lbs)  119  · Weight (kg)  54  · From (30 ml/kg)  1620  · To (35 ml/kg)  1890    · Nutrition Diagnostic Terminology #1: Oral or Nutrition Support Intake; Nutrient  · Oral or Nutrition Support Intake: Inadequate oral intake  · Nutrient: Increased nutrient needs (specify); Malnutrition; protein, Pt meets criteria for severe protein-calorie malnutrition in context of chronic disease  · Etiology: decreased PO intake 2/2 cancer  · Signs/Symptoms: PO intake below needs, muscle wasting, fat depletion, pressure ulcers  · Nutrition Intervention: Meals and Snack; Medical Food Supplements  · Meals and Snacks: General/healthful diet; Carbohydrate - modified diet  · Medical and Food Supplements: Commercial beverage    Wt Hx:  Height (cm): 162.56 (03-29-18 @ 11:09)  Weight (kg): 54.4 (03-29-18 @ 11:09)  BMI (kg/m2): 20.6 (03-29-18 @ 11:09)    Pertinent Medications: MEDICATIONS  (STANDING):  aspirin enteric coated 81 milliGRAM(s) Oral daily  dexamethasone     Tablet 4 milliGRAM(s) Oral three times a day  dextrose 5%. 1000 milliLiter(s) (50 mL/Hr) IV Continuous <Continuous>  dextrose 50% Injectable 12.5 Gram(s) IV Push once  dextrose 50% Injectable 25 Gram(s) IV Push once  dextrose 50% Injectable 25 Gram(s) IV Push once  docusate sodium 100 milliGRAM(s) Oral three times a day  insulin glargine Injectable (LANTUS) 5 Unit(s) SubCutaneous at bedtime  insulin lispro (HumaLOG) corrective regimen sliding scale   SubCutaneous three times a day before meals    MEDICATIONS  (PRN):  acetaminophen   Tablet 650 milliGRAM(s) Oral every 6 hours PRN mild to moderate pain  dextrose Gel 1 Dose(s) Oral once PRN Blood Glucose LESS THAN 70 milliGRAM(s)/deciliter  glucagon  Injectable 1 milliGRAM(s) IntraMuscular once PRN Glucose LESS THAN 70 milligrams/deciliter  oxyCODONE    5 mG/acetaminophen 325 mG 1 Tablet(s) Oral every 6 hours PRN Severe Pain (7 - 10)    Pertinent Labs: 04-04 Na131 mmol/L<L> Glu 158 mg/dL<H> K+ 3.9 mmol/L Cr  0.24 mg/dL<L> BUN 17 mg/dL 03-30 [id number] 8.8 %<H>     CAPILLARY BLOOD GLUCOSE      POCT Blood Glucose.: 204 mg/dL (06 Apr 2018 08:16)  POCT Blood Glucose.: 337 mg/dL (05 Apr 2018 20:56)  POCT Blood Glucose.: 324 mg/dL (05 Apr 2018 17:09)  POCT Blood Glucose.: 149 mg/dL (05 Apr 2018 13:04)          Monitoring and Evaluation:   [x] PO intake/Nutr support infusion [ x ] Tolerance to Nutr [ x ] weights [ x ] labs[ x ] follow up per protocol  [ ] other:

## 2018-04-10 DIAGNOSIS — D61.818 OTHER PANCYTOPENIA: ICD-10-CM

## 2018-04-10 DIAGNOSIS — E43 UNSPECIFIED SEVERE PROTEIN-CALORIE MALNUTRITION: ICD-10-CM

## 2018-04-10 DIAGNOSIS — S42.201D UNSPECIFIED FRACTURE OF UPPER END OF RIGHT HUMERUS, SUBSEQUENT ENCOUNTER FOR FRACTURE WITH ROUTINE HEALING: ICD-10-CM

## 2018-04-10 DIAGNOSIS — Z66 DO NOT RESUSCITATE: ICD-10-CM

## 2018-04-10 DIAGNOSIS — R53.81 OTHER MALAISE: ICD-10-CM

## 2018-04-10 DIAGNOSIS — S32.501D UNSPECIFIED FRACTURE OF RIGHT PUBIS, SUBSEQUENT ENCOUNTER FOR FRACTURE WITH ROUTINE HEALING: ICD-10-CM

## 2018-04-10 DIAGNOSIS — C79.31 SECONDARY MALIGNANT NEOPLASM OF BRAIN: ICD-10-CM

## 2018-04-10 DIAGNOSIS — G13.1 OTHER SYSTEMIC ATROPHY PRIMARILY AFFECTING CENTRAL NERVOUS SYSTEM IN NEOPLASTIC DISEASE: ICD-10-CM

## 2018-04-10 DIAGNOSIS — Z79.82 LONG TERM (CURRENT) USE OF ASPIRIN: ICD-10-CM

## 2018-04-10 DIAGNOSIS — I50.22 CHRONIC SYSTOLIC (CONGESTIVE) HEART FAILURE: ICD-10-CM

## 2018-04-10 DIAGNOSIS — G92 TOXIC ENCEPHALOPATHY: ICD-10-CM

## 2018-04-10 DIAGNOSIS — C34.90 MALIGNANT NEOPLASM OF UNSPECIFIED PART OF UNSPECIFIED BRONCHUS OR LUNG: ICD-10-CM

## 2018-04-10 DIAGNOSIS — I11.0 HYPERTENSIVE HEART DISEASE WITH HEART FAILURE: ICD-10-CM

## 2018-04-10 DIAGNOSIS — E22.2 SYNDROME OF INAPPROPRIATE SECRETION OF ANTIDIURETIC HORMONE: ICD-10-CM

## 2018-04-10 DIAGNOSIS — T38.0X5A ADVERSE EFFECT OF GLUCOCORTICOIDS AND SYNTHETIC ANALOGUES, INITIAL ENCOUNTER: ICD-10-CM

## 2018-04-10 DIAGNOSIS — Z99.81 DEPENDENCE ON SUPPLEMENTAL OXYGEN: ICD-10-CM

## 2018-04-10 DIAGNOSIS — T50.8X5A ADVERSE EFFECT OF DIAGNOSTIC AGENTS, INITIAL ENCOUNTER: ICD-10-CM

## 2018-04-10 DIAGNOSIS — D69.6 THROMBOCYTOPENIA, UNSPECIFIED: ICD-10-CM

## 2018-04-10 DIAGNOSIS — E11.65 TYPE 2 DIABETES MELLITUS WITH HYPERGLYCEMIA: ICD-10-CM

## 2018-04-10 DIAGNOSIS — J44.1 CHRONIC OBSTRUCTIVE PULMONARY DISEASE WITH (ACUTE) EXACERBATION: ICD-10-CM

## 2018-07-16 PROBLEM — E11.9 TYPE 2 DIABETES MELLITUS WITHOUT COMPLICATIONS: Chronic | Status: ACTIVE | Noted: 2017-08-16

## 2018-07-17 PROBLEM — C34.90 MALIGNANT NEOPLASM OF UNSPECIFIED PART OF UNSPECIFIED BRONCHUS OR LUNG: Chronic | Status: ACTIVE | Noted: 2018-03-06

## 2019-01-11 NOTE — PATIENT PROFILE ADULT. - TOBACCO USE
Patient states she started noticing food drop out of the R side of her mouth on Wednesday.  States she visually saw droop on R side mouth last night.  Patient also c/o R sided neck pain since Monday or Tuesday.   Former smoker

## 2020-06-16 NOTE — ED PROVIDER NOTE - PRINCIPAL DIAGNOSIS
Metastatic lung carcinoma Pelvic fracture Isotretinoin Pregnancy And Lactation Text: This medication is Pregnancy Category X and is considered extremely dangerous during pregnancy. It is unknown if it is excreted in breast milk.

## 2022-11-22 NOTE — ED ADULT NURSE NOTE - ED STAT RN HANDOFF DETAILS
INTERVAL HPI/OVERNIGHT EVENTS:  Febrile 101.4F, CXR no signs of infection, if repeat, start Vanco/Cefepime.     SUBJECTIVE: Patient seen and examined at bedside.     VITAL SIGNS:  ICU Vital Signs Last 24 Hrs  T(C): 38.1 (2022 10:38), Max: 38.7 (2022 23:35)  T(F): 100.5 (2022 10:38), Max: 101.6 (2022 23:35)  HR: 98 (2022 13:25) (94 - 124)  BP: 95/57 (2022 13:25) (95/57 - 166/82)  BP(mean): 70 (2022 13:25) (70 - 116)  ABP: --  ABP(mean): --  RR: 23 (2022 13:25) (15 - 33)  SpO2: 98% (2022 13:25) (94% - 100%)    O2 Parameters below as of 2022 13:25  Patient On (Oxygen Delivery Method): room air            Plateau pressure:   P/F ratio:      @ 07:  -   @ 07:00  --------------------------------------------------------  IN: 1135 mL / OUT: 2125 mL / NET: -990 mL     @ 07:  -   @ 16:13  --------------------------------------------------------  IN: 0 mL / OUT: 50 mL / NET: -50 mL      CAPILLARY BLOOD GLUCOSE      POCT Blood Glucose.: 171 mg/dL (2022 11:36)    ECG:    PHYSICAL EXAMINATION:  General: Comfortable, no acute distress, cooperative with exam.  HEENT: PERRLA, EOMI, moist mucous membranes.  Respiratory: CTAB, normal respiratory effort, no coughing, wheezes, crackles, or rales.  CV: RRR, S1S2, no murmurs, rubs or gallops. No JVD. Distal pulses intact.  Abdominal: Soft, nontender, nondistended, no rebound or guarding, normal bowel sounds.  Neurology: AOx3, no focal neuro defects, GONZALES x 4.  Extremities: No pitting edema, + Peripheral pulses.  Incisions:   Tubes:    MEDICATIONS:  MEDICATIONS  (STANDING):  chlorhexidine 2% Cloths 1 Application(s) Topical <User Schedule>  dexMEDEtomidine Infusion 0.5 MICROgram(s)/kG/Hr (9.14 mL/Hr) IV Continuous <Continuous>  dextrose 5%. 1000 milliLiter(s) (50 mL/Hr) IV Continuous <Continuous>  dextrose 5%. 1000 milliLiter(s) (100 mL/Hr) IV Continuous <Continuous>  dextrose 50% Injectable 25 Gram(s) IV Push once  dextrose 50% Injectable 12.5 Gram(s) IV Push once  dextrose 50% Injectable 25 Gram(s) IV Push once  folic acid 1 milliGRAM(s) Oral daily  glucagon  Injectable 1 milliGRAM(s) IntraMuscular once  heparin   Injectable 5000 Unit(s) SubCutaneous every 8 hours  hydrALAZINE 25 milliGRAM(s) Oral three times a day  insulin lispro (ADMELOG) corrective regimen sliding scale   SubCutaneous three times a day before meals  labetalol 100 milliGRAM(s) Oral two times a day  multivitamin 1 Tablet(s) Oral daily  NIFEdipine XL 60 milliGRAM(s) Oral daily  pantoprazole    Tablet 40 milliGRAM(s) Oral before breakfast  senna 2 Tablet(s) Oral at bedtime  thiamine 100 milliGRAM(s) Oral daily    MEDICATIONS  (PRN):  acetaminophen     Tablet .. 650 milliGRAM(s) Oral every 6 hours PRN Temp greater or equal to 38C (100.4F), Mild Pain (1 - 3)  dextrose Oral Gel 15 Gram(s) Oral once PRN Blood Glucose LESS THAN 70 milliGRAM(s)/deciliter  LORazepam     Tablet 2 milliGRAM(s) Oral every 4 hours PRN Symptom-triggered 2 point increase in CIWA-Ar  LORazepam   Injectable 2 milliGRAM(s) IV Push every 4 hours PRN Symptom-triggered: each CIWA -Ar score 8 or GREATER      ALLERGIES:  Allergies    No Known Allergies    Intolerances        LABS:                        8.4    9.83  )-----------( 194      ( 2022 05:45 )             26.7         143  |  102  |  38<H>  ----------------------------<  175<H>  3.6   |  25  |  4.7<HH>    Ca    9.3      2022 05:45  Phos  3.8       Mg     1.8         TPro  7.0  /  Alb  3.6  /  TBili  0.7  /  DBili  x   /  AST  24  /  ALT  16  /  AlkPhos  84        Urinalysis Basic - ( 2022 01:30 )    Color: Yellow / Appearance: Clear / S.020 / pH: x  Gluc: x / Ketone: 15  / Bili: Negative / Urobili: 0.2 mg/dL   Blood: x / Protein: 100 mg/dL / Nitrite: Negative   Leuk Esterase: Negative / RBC: >50 /HPF / WBC 6-10 /HPF   Sq Epi: x / Non Sq Epi: Few /HPF / Bacteria: x        RADIOLOGY & ADDITIONAL TESTS: Reviewed. report given to ash jimenez 1n

## 2023-07-05 NOTE — DISCHARGE NOTE ADULT - ABILITY TO HEAR (WITH HEARING AID OR HEARING APPLIANCE IF NORMALLY USED):
Mildly to Moderately Impaired: difficulty hearing in some environments or speaker may need to increase volume or speak distinctly
yes

## 2023-10-23 NOTE — DISCHARGE NOTE ADULT - SECONDARY DIAGNOSIS.
Multifactorial from her MS, lumbar spine issues, peripheral neuropathy, ortho issues, deconditioning. Encouraged her to continue to use her walker to prevent falls. Malignant neoplasm of lung, unspecified laterality, unspecified part of lung

## 2023-10-23 NOTE — ED PROVIDER NOTE - SCRIBE NAME
.  .                                                      At [facility][note 3], one important tool we use to improve our patient services is our Patient Survey.  Following your visit you may receive our survey in the mail or by email.    Please take the time to complete the survey.    If your visit with us was great, we want to hear about it.    If we can improve, please let us know how.         
Lili Vazana

## 2023-12-06 NOTE — ED ADULT NURSE NOTE - TEMPLATE LIST FOR HEAD TO TOE ASSESSMENT
CORTISONE INJECTION CARE    The injection site should never get red, hot, or swollen and if it does the patient will contact our office right away. The patient may experience a increase in soreness the first 24-48 hours due to a cortisone flair and can take anti-inflammatories for a short period of time to reduce that soreness. The patient should not submerge the injection site in water for a minimum of 24 hours to avoid infection. This means no lakes, pools, ponds, or hot tubs for 24 hours. If the patient is diabetic the injection may increase their blood sugar for up to one week. The patient can do this cortisone injection once every 4 months as needed. Orthopedic